# Patient Record
Sex: FEMALE | Race: WHITE | NOT HISPANIC OR LATINO | Employment: PART TIME | URBAN - METROPOLITAN AREA
[De-identification: names, ages, dates, MRNs, and addresses within clinical notes are randomized per-mention and may not be internally consistent; named-entity substitution may affect disease eponyms.]

---

## 2017-10-27 LAB
EXTERNAL HEPATITIS B SURFACE ANTIGEN: NEGATIVE
EXTERNAL RUBELLA QUALITATIVE: NORMAL
EXTERNAL SYPHILIS RPR SCREEN: NORMAL
HIV1 P24 AG SERPL QL IA: NORMAL

## 2017-12-07 ENCOUNTER — TRANSCRIBE ORDERS (OUTPATIENT)
Dept: WOMENS IMAGING | Facility: HOSPITAL | Age: 37
End: 2017-12-07

## 2017-12-07 DIAGNOSIS — O09.522 AMA (ADVANCED MATERNAL AGE) MULTIGRAVIDA 35+, SECOND TRIMESTER: Primary | ICD-10-CM

## 2017-12-07 DIAGNOSIS — Z36.89 SCREENING, ANTENATAL, FOR FETAL ANATOMIC SURVEY: ICD-10-CM

## 2018-01-15 ENCOUNTER — OFFICE VISIT (OUTPATIENT)
Dept: OBSTETRICS AND GYNECOLOGY | Facility: HOSPITAL | Age: 38
End: 2018-01-15

## 2018-01-15 ENCOUNTER — HOSPITAL ENCOUNTER (OUTPATIENT)
Dept: WOMENS IMAGING | Facility: HOSPITAL | Age: 38
Discharge: HOME | End: 2018-01-15
Attending: OBSTETRICS & GYNECOLOGY | Admitting: OBSTETRICS & GYNECOLOGY

## 2018-01-15 VITALS
WEIGHT: 221.8 LBS | DIASTOLIC BLOOD PRESSURE: 64 MMHG | HEIGHT: 65 IN | SYSTOLIC BLOOD PRESSURE: 122 MMHG | BODY MASS INDEX: 36.96 KG/M2

## 2018-01-15 DIAGNOSIS — O09.522 AMA (ADVANCED MATERNAL AGE) MULTIGRAVIDA 35+, SECOND TRIMESTER: ICD-10-CM

## 2018-01-15 DIAGNOSIS — Z82.69 FAMILY HISTORY OF CLUBFOOT: ICD-10-CM

## 2018-01-15 DIAGNOSIS — O09.522 ELDERLY MULTIGRAVIDA IN SECOND TRIMESTER: Primary | ICD-10-CM

## 2018-01-15 DIAGNOSIS — Z36.89 SCREENING, ANTENATAL, FOR FETAL ANATOMIC SURVEY: ICD-10-CM

## 2018-01-15 DIAGNOSIS — Z98.891 PREVIOUS CESAREAN SECTION: ICD-10-CM

## 2018-01-15 PROCEDURE — 76811 OB US DETAILED SNGL FETUS: CPT

## 2018-01-15 PROCEDURE — 76811 OB US DETAILED SNGL FETUS: CPT | Performed by: OBSTETRICS & GYNECOLOGY

## 2018-01-15 RX ORDER — CETIRIZINE HYDROCHLORIDE 10 MG/1
10 TABLET ORAL DAILY
COMMUNITY

## 2018-01-15 RX ORDER — ALBUTEROL SULFATE 90 UG/1
POWDER, METERED RESPIRATORY (INHALATION)
Refills: 2 | Status: ON HOLD | COMMUNITY
Start: 2017-12-04 | End: 2018-05-27

## 2018-01-15 RX ORDER — PROCHLORPERAZINE MALEATE 10 MG
10 TABLET ORAL EVERY 6 HOURS PRN
Status: ON HOLD | COMMUNITY
End: 2018-05-27

## 2018-01-15 RX ORDER — LABETALOL 200 MG/1
200 TABLET, FILM COATED ORAL 2 TIMES DAILY
COMMUNITY
Start: 2018-01-12 | End: 2018-02-05

## 2018-01-15 NOTE — PROGRESS NOTES
Denies problems. Reports had [facility] NIPT with normal results. States she submitted baseline 24 hour urine specimen this morning. Next prenatal visit 2/1/2018.

## 2018-01-15 NOTE — PROGRESS NOTES
Documentation of the ultasound findings, images, and interpretations with be available in the patient's Viewpoint report located in the Chart Review Imaging tab in [employer].

## 2018-01-18 ENCOUNTER — APPOINTMENT (OUTPATIENT)
Dept: WOMENS IMAGING | Facility: HOSPITAL | Age: 38
End: 2018-01-18
Attending: OBSTETRICS & GYNECOLOGY

## 2018-02-01 ENCOUNTER — TRANSCRIBE ORDERS (OUTPATIENT)
Dept: GENERAL RADIOLOGY | Facility: HOSPITAL | Age: 38
End: 2018-02-01

## 2018-02-01 ENCOUNTER — HOSPITAL ENCOUNTER (OUTPATIENT)
Dept: GENERAL RADIOLOGY | Facility: HOSPITAL | Age: 38
Discharge: HOME | End: 2018-02-01
Attending: OBSTETRICS & GYNECOLOGY | Admitting: OBSTETRICS & GYNECOLOGY

## 2018-02-01 DIAGNOSIS — J45.902 CHRONIC OBSTRUCTIVE ASTHMA WITH STATUS ASTHMATICUS (HCC): Primary | ICD-10-CM

## 2018-02-01 DIAGNOSIS — J44.9 CHRONIC OBSTRUCTIVE ASTHMA WITH STATUS ASTHMATICUS (HCC): Primary | ICD-10-CM

## 2018-02-01 DIAGNOSIS — J45.902 CHRONIC OBSTRUCTIVE ASTHMA WITH STATUS ASTHMATICUS (HCC): ICD-10-CM

## 2018-02-01 DIAGNOSIS — J44.9 CHRONIC OBSTRUCTIVE ASTHMA WITH STATUS ASTHMATICUS (HCC): ICD-10-CM

## 2018-02-01 PROCEDURE — 71046 X-RAY EXAM CHEST 2 VIEWS: CPT

## 2018-02-05 ENCOUNTER — OFFICE VISIT (OUTPATIENT)
Dept: PULMONOLOGY | Facility: CLINIC | Age: 38
End: 2018-02-05

## 2018-02-05 VITALS
TEMPERATURE: 97.9 F | BODY MASS INDEX: 37.82 KG/M2 | SYSTOLIC BLOOD PRESSURE: 112 MMHG | OXYGEN SATURATION: 98 % | DIASTOLIC BLOOD PRESSURE: 68 MMHG | WEIGHT: 227 LBS | HEIGHT: 65 IN | HEART RATE: 80 BPM

## 2018-02-05 DIAGNOSIS — E66.09 CLASS 1 OBESITY DUE TO EXCESS CALORIES WITH SERIOUS COMORBIDITY AND BODY MASS INDEX (BMI) OF 32.0 TO 32.9 IN ADULT: ICD-10-CM

## 2018-02-05 DIAGNOSIS — Z87.09 H/O EXTRINSIC ASTHMA: ICD-10-CM

## 2018-02-05 DIAGNOSIS — K21.9 CHRONIC GERD: ICD-10-CM

## 2018-02-05 DIAGNOSIS — R06.02 SOB (SHORTNESS OF BREATH): Primary | ICD-10-CM

## 2018-02-05 PROCEDURE — 94726 PLETHYSMOGRAPHY LUNG VOLUMES: CPT | Performed by: INTERNAL MEDICINE

## 2018-02-05 PROCEDURE — 99204 OFFICE O/P NEW MOD 45 MIN: CPT | Performed by: INTERNAL MEDICINE

## 2018-02-05 PROCEDURE — 94729 DIFFUSING CAPACITY: CPT | Performed by: INTERNAL MEDICINE

## 2018-02-05 PROCEDURE — 94375 RESPIRATORY FLOW VOLUME LOOP: CPT | Performed by: INTERNAL MEDICINE

## 2018-02-05 RX ORDER — METHYLDOPA 250 MG/1
500 TABLET, FILM COATED ORAL 3 TIMES DAILY
COMMUNITY
Start: 2018-01-25 | End: 2018-04-26 | Stop reason: DRUGHIGH

## 2018-02-05 RX ORDER — NIFEDIPINE 10 MG/1
10 CAPSULE ORAL EVERY 4 HOURS PRN
Status: ON HOLD | COMMUNITY
Start: 2018-01-24 | End: 2018-05-27

## 2018-02-05 RX ORDER — CLINDAMYCIN HYDROCHLORIDE 150 MG/1
CAPSULE ORAL
COMMUNITY
Start: 2018-01-25 | End: 2018-03-29

## 2018-02-06 PROBLEM — K21.9 CHRONIC GERD: Status: ACTIVE | Noted: 2018-02-06

## 2018-02-06 PROBLEM — Z87.09 H/O EXTRINSIC ASTHMA: Status: ACTIVE | Noted: 2018-02-06

## 2018-02-06 PROBLEM — E66.811 CLASS 1 OBESITY IN ADULT: Status: ACTIVE | Noted: 2018-02-06

## 2018-02-06 PROBLEM — E66.9 CLASS 1 OBESITY IN ADULT: Status: ACTIVE | Noted: 2018-02-06

## 2018-02-06 NOTE — PROGRESS NOTES
PULMONARY  NOTE    Chief Complaint     Dyspnea, history of asthma, 21 week pregnancy, hypertension    History of Present Illness     37-year-old white female referred for evaluation of dyspnea.    Patient is 21 weeks pregnant and being followed by Dr. Bailey.    She has a history of childhood asthma.  She is been on Advair 500 for years  In the past she feels that her symptoms have been well controlled on Advair.    However he has experienced more dyspnea on exertion, not at rest.  She gets short of breath going short distances, even one flight of stairs.  In addition to the Advair she is been using albuterol approximately 3 times a day for shortness of breath.    She has had issues with blood pressure and had been on beta blockers.  She thought that her symptoms may be related to beta blockers and her beta blocker was changed to methyldopa.  Despite that she continues to have persistent symptoms of dyspnea.    In addition she has experienced a recent upper respiratory tract infection.  She was treated with 2 courses of antibiotics and received a dexamethasone shot.    No lower extremity edema or palpitations.  She has no history of cardiac disease    With her pregnancy she has noted worsening reflux symptoms that are occurring on a daily basis.  Not regular following reflux precautions.    She has no history of pulmonary thromboembolic disease    Patient Active Problem List   Diagnosis   • Family history of clubfoot   • Elderly multigravida in second trimester   • Previous  section   • H/O Asthma   • GERD   • Class 1 obesity in adult     Allergies   Allergen Reactions   • Doxycycline      Chemical esophagitis     • Avelox [Moxifloxacin] Rash   • Zithromax [Azithromycin] Rash       Current Outpatient Prescriptions:   •  albuterol (ACCUNEB) 1.25 MG/3ML nebulizer solution, Take 3 mL by nebulization Every 6 (Six) Hours As Needed for Wheezing or Shortness of Air., Disp: 90 mL, Rfl: 0  •  aspirin 81 MG EC  "tablet, Take 81 mg by mouth Daily., Disp: , Rfl:   •  cetirizine (zyrTEC) 10 MG tablet, Take 10 mg by mouth Daily., Disp: , Rfl:   •  clindamycin (CLEOCIN) 150 MG capsule, , Disp: , Rfl:   •  flunisolide (NASALIDE) 25 MCG/ACT (0.025%) solution nasal spray, Inhale 2 sprays Every 12 (Twelve) Hours., Disp: , Rfl:   •  fluticasone-salmeterol (ADVAIR) 500-50 MCG/DOSE DISKUS, Inhale 2 (Two) Times a Day., Disp: , Rfl:   •  lansoprazole (PREVACID) 15 MG capsule, Take 15 mg by mouth Daily., Disp: , Rfl:   •  methyldopa (ALDOMET) 250 MG tablet, , Disp: , Rfl:   •  NIFEdipine (PROCARDIA) 10 MG capsule, , Disp: , Rfl:   •  PRENATAL GUMMY VITAMIN, Chew 2 each Daily. Takes when able to tolerate, Disp: , Rfl:   •  PROAIR RESPICLICK 108 (90 Base) MCG/ACT inhaler, INHALE 2 PUFFS BY MOUTH EVERY FOUR TO SIX HOURS AS NEEDED, Disp: , Rfl: 2  •  prochlorperazine (COMPAZINE) 10 MG tablet, Take 10 mg by mouth Every 6 (Six) Hours As Needed for Nausea or Vomiting (with migraines)., Disp: , Rfl:   MEDICATION LIST AND ALLERGIES REVIEWED.    Family History   Problem Relation Age of Onset   • Hypothyroidism Mother    • Hypothyroidism Father    • Asthma Sister    • Hypothyroidism Sister    • Hypothyroidism Brother    • Asthma Brother      Social History   Substance Use Topics   • Smoking status: Never Smoker   • Smokeless tobacco: Never Used   • Alcohol use Yes      Comment: occasional use when not pregnant     Social History     Social History Narrative        Has a 9-year-old child with history of asthma    Works as a pediatric nurse    Doesn't drink alcohol    Lifelong nonsmoker     FAMILY AND SOCIAL HISTORY REVIEWED.    Review of Systems  ALSO REFER TO SCANNED ROS SHEET FROM SAME DATE.    /68 (BP Location: Right arm, Patient Position: Sitting, Cuff Size: Adult)  Pulse 80  Temp 97.9 °F (36.6 °C)  Ht 165.1 cm (65\")  Wt 103 kg (227 lb)  LMP 09/03/2017  SpO2 98%  BMI 37.77 kg/m2  Physical Exam   Constitutional: She is oriented " to person, place, and time. She appears well-developed. No distress.   HENT:   Head: Normocephalic and atraumatic.   Neck: No thyromegaly present.   Cardiovascular: Normal rate, regular rhythm and normal heart sounds.    No murmur heard.  Pulmonary/Chest: Effort normal and breath sounds normal. No stridor.   Abdominal: Soft. Bowel sounds are normal.   Protuberant, soft   Musculoskeletal: Normal range of motion. She exhibits no edema.   Lymphadenopathy:     She has no cervical adenopathy.        Right: No supraclavicular and no epitrochlear adenopathy present.        Left: No supraclavicular and no epitrochlear adenopathy present.   Neurological: She is alert and oriented to person, place, and time.   Skin: Skin is warm and dry. She is not diaphoretic.   Psychiatric: She has a normal mood and affect. Her behavior is normal.   Nursing note and vitals reviewed.      Results     PA and lateral chest x-ray from [facility] dated 2/1/2018 reveals no evidence of active disease.  Some basilar atelectasis is noted    PFTs reveal no airway obstruction, no restriction, and a normal diffusion capacity    Problem List       ICD-10-CM ICD-9-CM   1. SOB (shortness of breath) R06.02 786.05   2. H/O Asthma Z87.09 V12.69   3. GERD K21.9 530.81   4. Class 1 obesity E66.09 278.00    Z68.32 V85.32       Discussion     We discussed her test results.  Her exam and PFTs are unremarkable/normal.  She has no evidence of asthma on her current spirometry.  However, she is on therapy which could be masking signs of asthma.    She describes quite prolific symptoms of dyspnea on exertion.  These symptoms seem out of proportion to her physical exam findings.  If asthma were the sole cause of her symptoms, I would expect at least some spirometric abnormality.    Other considerations would be cardiovascular disease although her cardiac silhouette is within normal limits of size on chest x-ray and she has no evidence of heart failure.  I  don't appreciate any overt murmurs on examination, either.    My index of suspicion is low for pulmonary thromboembolic disease but needs to be considered if her dyspnea is not better explained.    For the time being, in case asthma is playing a role, I have elected to increase her ICS.  We'll add Asmanex to her Advair.  Continue albuterol on an as-needed basis.  These medications should be safe during her pregnancy.    Further consideration needs to be given to a transthoracic echocardiogram and consideration of a perfusion scan.  We'll discuss further with Dr. Bailey    At the very least, I'll see her back in about a month    Micah Esquivel MD  Note electronically signed    CC: No Known Provider

## 2018-02-12 ENCOUNTER — TRANSCRIBE ORDERS (OUTPATIENT)
Dept: PULMONOLOGY | Facility: HOSPITAL | Age: 38
End: 2018-02-12

## 2018-02-12 DIAGNOSIS — R06.02 SHORTNESS OF BREATH: Primary | ICD-10-CM

## 2018-02-14 ENCOUNTER — HOSPITAL ENCOUNTER (OUTPATIENT)
Dept: CARDIOLOGY | Facility: HOSPITAL | Age: 38
Discharge: HOME | End: 2018-02-14
Attending: OBSTETRICS & GYNECOLOGY | Admitting: OBSTETRICS & GYNECOLOGY

## 2018-02-14 VITALS
SYSTOLIC BLOOD PRESSURE: 154 MMHG | BODY MASS INDEX: 36.49 KG/M2 | WEIGHT: 227.07 LBS | HEIGHT: 66 IN | DIASTOLIC BLOOD PRESSURE: 74 MMHG

## 2018-02-14 DIAGNOSIS — R06.02 SHORTNESS OF BREATH: ICD-10-CM

## 2018-02-14 LAB
BH CV ECHO MEAS - AO ROOT AREA (BSA CORRECTED): 1.2
BH CV ECHO MEAS - AO ROOT AREA: 5.3 CM^2
BH CV ECHO MEAS - AO ROOT DIAM: 2.6 CM
BH CV ECHO MEAS - BSA(HAYCOCK): 2.2 M^2
BH CV ECHO MEAS - BSA: 2.1 M^2
BH CV ECHO MEAS - BZI_BMI: 37 KILOGRAMS/M^2
BH CV ECHO MEAS - BZI_METRIC_HEIGHT: 167.6 CM
BH CV ECHO MEAS - BZI_METRIC_WEIGHT: 103.9 KG
BH CV ECHO MEAS - CONTRAST EF (2CH): 66.7 ML/M^2
BH CV ECHO MEAS - CONTRAST EF 4CH: 62.9 ML/M^2
BH CV ECHO MEAS - EDV(CUBED): 57.1 ML
BH CV ECHO MEAS - EDV(MOD-SP2): 111 ML
BH CV ECHO MEAS - EDV(MOD-SP4): 105 ML
BH CV ECHO MEAS - EDV(TEICH): 63.9 ML
BH CV ECHO MEAS - EF(CUBED): 79.5 %
BH CV ECHO MEAS - EF(MOD-SP2): 66.7 %
BH CV ECHO MEAS - EF(MOD-SP4): 62.9 %
BH CV ECHO MEAS - EF(TEICH): 72.6 %
BH CV ECHO MEAS - ESV(CUBED): 11.7 ML
BH CV ECHO MEAS - ESV(MOD-SP2): 37 ML
BH CV ECHO MEAS - ESV(MOD-SP4): 39 ML
BH CV ECHO MEAS - ESV(TEICH): 17.5 ML
BH CV ECHO MEAS - FS: 41 %
BH CV ECHO MEAS - IVS/LVPW: 1.3
BH CV ECHO MEAS - IVSD: 1.5 CM
BH CV ECHO MEAS - LA DIMENSION: 4 CM
BH CV ECHO MEAS - LA/AO: 1.5
BH CV ECHO MEAS - LV DIASTOLIC VOL/BSA (35-75): 49.6 ML/M^2
BH CV ECHO MEAS - LV MASS(C)D: 176.4 GRAMS
BH CV ECHO MEAS - LV MASS(C)DI: 83.2 GRAMS/M^2
BH CV ECHO MEAS - LV MAX PG: 10 MMHG
BH CV ECHO MEAS - LV MEAN PG: 5 MMHG
BH CV ECHO MEAS - LV SYSTOLIC VOL/BSA (12-30): 18.4 ML/M^2
BH CV ECHO MEAS - LV V1 MAX: 158 CM/SEC
BH CV ECHO MEAS - LV V1 MEAN: 98.3 CM/SEC
BH CV ECHO MEAS - LV V1 VTI: 29.6 CM
BH CV ECHO MEAS - LVIDD: 3.9 CM
BH CV ECHO MEAS - LVIDS: 2.3 CM
BH CV ECHO MEAS - LVLD AP2: 8.1 CM
BH CV ECHO MEAS - LVLD AP4: 8.5 CM
BH CV ECHO MEAS - LVLS AP2: 6.2 CM
BH CV ECHO MEAS - LVLS AP4: 6.5 CM
BH CV ECHO MEAS - LVOT AREA (M): 4.5 CM^2
BH CV ECHO MEAS - LVOT AREA: 4.5 CM^2
BH CV ECHO MEAS - LVOT DIAM: 2.4 CM
BH CV ECHO MEAS - LVPWD: 1.1 CM
BH CV ECHO MEAS - MV A MAX VEL: 80.2 CM/SEC
BH CV ECHO MEAS - MV E MAX VEL: 67.9 CM/SEC
BH CV ECHO MEAS - MV E/A: 0.85
BH CV ECHO MEAS - PA ACC SLOPE: 1629 CM/SEC^2
BH CV ECHO MEAS - PA ACC TIME: 0.09 SEC
BH CV ECHO MEAS - PA PR(ACCEL): 39.9 MMHG
BH CV ECHO MEAS - RAP SYSTOLE: 8 MMHG
BH CV ECHO MEAS - RVSP: 16 MMHG
BH CV ECHO MEAS - SI(CUBED): 21.4 ML/M^2
BH CV ECHO MEAS - SI(LVOT): 63.2 ML/M^2
BH CV ECHO MEAS - SI(MOD-SP2): 34.9 ML/M^2
BH CV ECHO MEAS - SI(MOD-SP4): 31.2 ML/M^2
BH CV ECHO MEAS - SI(TEICH): 21.9 ML/M^2
BH CV ECHO MEAS - SV(CUBED): 45.4 ML
BH CV ECHO MEAS - SV(LVOT): 133.9 ML
BH CV ECHO MEAS - SV(MOD-SP2): 74 ML
BH CV ECHO MEAS - SV(MOD-SP4): 66 ML
BH CV ECHO MEAS - SV(TEICH): 46.4 ML
BH CV ECHO MEAS - TAPSE (>1.6): 3.2 CM2
BH CV ECHO MEAS - TR MAX VEL: 138 CM/SEC
BH CV VAS BP LEFT ARM: NORMAL MMHG
BH CV XLRA - RV BASE: 3.8 CM
BH CV XLRA - RV LENGTH: 6.2 CM
BH CV XLRA - RV MID: 3.4 CM
LEFT ATRIUM VOLUME INDEX: 26 ML/M2
LV EF 2D ECHO EST: 70 %

## 2018-02-14 PROCEDURE — 93306 TTE W/DOPPLER COMPLETE: CPT | Performed by: INTERNAL MEDICINE

## 2018-02-14 PROCEDURE — 93306 TTE W/DOPPLER COMPLETE: CPT

## 2018-02-24 ENCOUNTER — HOSPITAL ENCOUNTER (EMERGENCY)
Facility: HOSPITAL | Age: 38
Discharge: HOME | End: 2018-02-24
Attending: EMERGENCY MEDICINE | Admitting: EMERGENCY MEDICINE

## 2018-02-24 VITALS
HEART RATE: 85 BPM | SYSTOLIC BLOOD PRESSURE: 137 MMHG | DIASTOLIC BLOOD PRESSURE: 65 MMHG | HEIGHT: 66 IN | OXYGEN SATURATION: 96 % | RESPIRATION RATE: 17 BRPM | WEIGHT: 227 LBS | BODY MASS INDEX: 36.48 KG/M2 | TEMPERATURE: 98 F

## 2018-02-24 DIAGNOSIS — J06.9 UPPER RESPIRATORY TRACT INFECTION, UNSPECIFIED TYPE: ICD-10-CM

## 2018-02-24 DIAGNOSIS — J45.901 EXACERBATION OF ASTHMA, UNSPECIFIED ASTHMA SEVERITY, UNSPECIFIED WHETHER PERSISTENT: Primary | ICD-10-CM

## 2018-02-24 LAB
ALBUMIN SERPL-MCNC: 3.9 G/DL (ref 3.5–5)
ALBUMIN/GLOB SERPL: 1.2 G/DL (ref 1–2)
ALP SERPL-CCNC: 41 U/L (ref 38–126)
ALT SERPL W P-5'-P-CCNC: 24 U/L (ref 13–69)
ANION GAP SERPL CALCULATED.3IONS-SCNC: 20.9 MMOL/L
AST SERPL-CCNC: 14 U/L (ref 15–46)
BASOPHILS # BLD AUTO: 0.07 10*3/MM3 (ref 0–0.2)
BASOPHILS NFR BLD AUTO: 0.5 % (ref 0–2.5)
BILIRUB SERPL-MCNC: 0.3 MG/DL (ref 0.2–1.3)
BUN BLD-MCNC: 6 MG/DL (ref 7–20)
BUN/CREAT SERPL: 10 (ref 7.1–23.5)
CALCIUM SPEC-SCNC: 9.4 MG/DL (ref 8.4–10.2)
CHLORIDE SERPL-SCNC: 110 MMOL/L (ref 98–107)
CO2 SERPL-SCNC: 17 MMOL/L (ref 26–30)
CREAT BLD-MCNC: 0.6 MG/DL (ref 0.6–1.3)
DEPRECATED RDW RBC AUTO: 41.8 FL (ref 37–54)
EOSINOPHIL # BLD AUTO: 0.45 10*3/MM3 (ref 0–0.7)
EOSINOPHIL NFR BLD AUTO: 3.4 % (ref 0–7)
ERYTHROCYTE [DISTWIDTH] IN BLOOD BY AUTOMATED COUNT: 12.8 % (ref 11.5–14.5)
GFR SERPL CREATININE-BSD FRML MDRD: 113 ML/MIN/1.73
GLOBULIN UR ELPH-MCNC: 3.3 GM/DL
GLUCOSE BLD-MCNC: 111 MG/DL (ref 74–98)
HCT VFR BLD AUTO: 37.2 % (ref 37–47)
HGB BLD-MCNC: 13 G/DL (ref 12–16)
HOLD SPECIMEN: NORMAL
HOLD SPECIMEN: NORMAL
IMM GRANULOCYTES # BLD: 0.17 10*3/MM3 (ref 0–0.06)
IMM GRANULOCYTES NFR BLD: 1.3 % (ref 0–0.6)
LYMPHOCYTES # BLD AUTO: 2.3 10*3/MM3 (ref 0.6–3.4)
LYMPHOCYTES NFR BLD AUTO: 17.1 % (ref 10–50)
MCH RBC QN AUTO: 31.7 PG (ref 27–31)
MCHC RBC AUTO-ENTMCNC: 34.9 G/DL (ref 30–37)
MCV RBC AUTO: 90.7 FL (ref 81–99)
MONOCYTES # BLD AUTO: 1.01 10*3/MM3 (ref 0–0.9)
MONOCYTES NFR BLD AUTO: 7.5 % (ref 0–12)
NEUTROPHILS # BLD AUTO: 9.43 10*3/MM3 (ref 2–6.9)
NEUTROPHILS NFR BLD AUTO: 70.2 % (ref 37–80)
NRBC BLD MANUAL-RTO: 0 /100 WBC (ref 0–0)
NT-PROBNP SERPL-MCNC: 21 PG/ML (ref 0–125)
PLATELET # BLD AUTO: 330 10*3/MM3 (ref 130–400)
PMV BLD AUTO: 9.7 FL (ref 6–12)
POTASSIUM BLD-SCNC: 3.9 MMOL/L (ref 3.5–5.1)
PROT SERPL-MCNC: 7.2 G/DL (ref 6.3–8.2)
RBC # BLD AUTO: 4.1 10*6/MM3 (ref 4.2–5.4)
SODIUM BLD-SCNC: 144 MMOL/L (ref 137–145)
TROPONIN I SERPL-MCNC: <0.012 NG/ML (ref 0–0.03)
WBC NRBC COR # BLD: 13.43 10*3/MM3 (ref 4.8–10.8)
WHOLE BLOOD HOLD SPECIMEN: NORMAL
WHOLE BLOOD HOLD SPECIMEN: NORMAL

## 2018-02-24 PROCEDURE — 25010000002 MAGNESIUM SULFATE 2 GM/50ML SOLUTION: Performed by: EMERGENCY MEDICINE

## 2018-02-24 PROCEDURE — 25010000002 METHYLPREDNISOLONE PER 125 MG: Performed by: EMERGENCY MEDICINE

## 2018-02-24 PROCEDURE — 96365 THER/PROPH/DIAG IV INF INIT: CPT

## 2018-02-24 PROCEDURE — 99285 EMERGENCY DEPT VISIT HI MDM: CPT

## 2018-02-24 PROCEDURE — 94799 UNLISTED PULMONARY SVC/PX: CPT

## 2018-02-24 PROCEDURE — 96375 TX/PRO/DX INJ NEW DRUG ADDON: CPT

## 2018-02-24 PROCEDURE — 36600 WITHDRAWAL OF ARTERIAL BLOOD: CPT

## 2018-02-24 PROCEDURE — 80053 COMPREHEN METABOLIC PANEL: CPT | Performed by: EMERGENCY MEDICINE

## 2018-02-24 PROCEDURE — 94640 AIRWAY INHALATION TREATMENT: CPT

## 2018-02-24 PROCEDURE — 93005 ELECTROCARDIOGRAM TRACING: CPT | Performed by: EMERGENCY MEDICINE

## 2018-02-24 PROCEDURE — 83050 HGB METHEMOGLOBIN QUAN: CPT

## 2018-02-24 PROCEDURE — 83880 ASSAY OF NATRIURETIC PEPTIDE: CPT | Performed by: EMERGENCY MEDICINE

## 2018-02-24 PROCEDURE — 82805 BLOOD GASES W/O2 SATURATION: CPT

## 2018-02-24 PROCEDURE — 82375 ASSAY CARBOXYHB QUANT: CPT

## 2018-02-24 PROCEDURE — 85025 COMPLETE CBC W/AUTO DIFF WBC: CPT | Performed by: EMERGENCY MEDICINE

## 2018-02-24 PROCEDURE — 84484 ASSAY OF TROPONIN QUANT: CPT | Performed by: EMERGENCY MEDICINE

## 2018-02-24 RX ORDER — ALBUTEROL SULFATE 2.5 MG/3ML
2.5 SOLUTION RESPIRATORY (INHALATION) ONCE
Status: COMPLETED | OUTPATIENT
Start: 2018-02-24 | End: 2018-02-24

## 2018-02-24 RX ORDER — PREDNISONE 50 MG/1
50 TABLET ORAL DAILY
Qty: 4 TABLET | Refills: 0 | Status: SHIPPED | OUTPATIENT
Start: 2018-02-24 | End: 2018-03-29

## 2018-02-24 RX ORDER — SODIUM CHLORIDE 0.9 % (FLUSH) 0.9 %
10 SYRINGE (ML) INJECTION AS NEEDED
Status: DISCONTINUED | OUTPATIENT
Start: 2018-02-24 | End: 2018-02-24 | Stop reason: HOSPADM

## 2018-02-24 RX ORDER — METHYLPREDNISOLONE SODIUM SUCCINATE 125 MG/2ML
125 INJECTION, POWDER, LYOPHILIZED, FOR SOLUTION INTRAMUSCULAR; INTRAVENOUS ONCE
Status: COMPLETED | OUTPATIENT
Start: 2018-02-24 | End: 2018-02-24

## 2018-02-24 RX ORDER — MAGNESIUM SULFATE HEPTAHYDRATE 40 MG/ML
2 INJECTION, SOLUTION INTRAVENOUS ONCE
Status: COMPLETED | OUTPATIENT
Start: 2018-02-24 | End: 2018-02-24

## 2018-02-24 RX ORDER — ALBUTEROL SULFATE 2.5 MG/3ML
2.5 SOLUTION RESPIRATORY (INHALATION) ONCE
Status: DISCONTINUED | OUTPATIENT
Start: 2018-02-24 | End: 2018-02-24 | Stop reason: HOSPADM

## 2018-02-24 RX ADMIN — ALBUTEROL SULFATE 2.5 MG: 2.5 SOLUTION RESPIRATORY (INHALATION) at 08:33

## 2018-02-24 RX ADMIN — MAGNESIUM SULFATE IN WATER 2 G: 40 INJECTION, SOLUTION INTRAVENOUS at 08:34

## 2018-02-24 RX ADMIN — ALBUTEROL SULFATE 2.5 MG: 2.5 SOLUTION RESPIRATORY (INHALATION) at 09:34

## 2018-02-24 RX ADMIN — METHYLPREDNISOLONE SODIUM SUCCINATE 125 MG: 125 INJECTION, POWDER, FOR SOLUTION INTRAMUSCULAR; INTRAVENOUS at 08:34

## 2018-02-24 NOTE — ED PROVIDER NOTES
TRIAGE CHIEF COMPLAINT:     Nursing and triage notes reviewed    Chief Complaint   Patient presents with   • Shortness of Breath      HPI: Janell Terrell is a 37 y.o. female who presents to the emergency department complaining of Shortness of breath, cough, congestion.  Patient is currently 24 weeks pregnant.  Patient also has a history of asthma.  Patient states she states is having an asthma attack this morning.  She states she felt well yesterday evening when she went to bed.  She states that she woke up coughing, bringing up some green sputum, and wheezing.  She states she's been dealing with some upper respiratory issues since January, for the past several months.  She denies any chest pain.  Denies fevers or chills.  Denies abdominal pain, nausea, vomiting.     REVIEW OF SYSTEMS: All other systems reviewed and are negative     PAST MEDICAL HISTORY:   Past Medical History:   Diagnosis Date   • Anxiety and depression 2006    situational;  was deployed   • Asthma    • GERD (gastroesophageal reflux disease)    • Hypertension 2008    Had preeclampsia and CHTN since then   • Migraines         FAMILY HISTORY:   Family History   Problem Relation Age of Onset   • Hypothyroidism Mother    • Hypothyroidism Father    • Asthma Sister    • Hypothyroidism Sister    • Hypothyroidism Brother    • Asthma Brother         SOCIAL HISTORY:   Social History     Social History   • Marital status:      Spouse name: N/A   • Number of children: N/A   • Years of education: N/A     Occupational History   • Not on file.     Social History Main Topics   • Smoking status: Never Smoker   • Smokeless tobacco: Never Used   • Alcohol use Yes      Comment: occasional use when not pregnant   • Drug use: No   • Sexual activity: Not on file     Other Topics Concern   • Not on file     Social History Narrative        Has a 9-year-old child with history of asthma    Works as a pediatric nurse    Doesn't drink alcohol    Lifelong  nonsmoker        SURGICAL HISTORY:   Past Surgical History:   Procedure Laterality Date   •  SECTION     • WISDOM TOOTH EXTRACTION          CURRENT MEDICATIONS:      Medication List      ASK your doctor about these medications          * PROAIR RESPICLICK 108 (90 Base) MCG/ACT inhaler   Generic drug:  albuterol       * albuterol 1.25 MG/3ML nebulizer solution   Commonly known as:  ACCUNEB   Take 3 mL by nebulization Every 6 (Six) Hours As Needed for Wheezing or   Shortness of Air.       aspirin 81 MG EC tablet       cetirizine 10 MG tablet   Commonly known as:  zyrTEC       clindamycin 150 MG capsule   Commonly known as:  CLEOCIN       flunisolide 25 MCG/ACT (0.025%) solution nasal spray   Commonly known as:  NASALIDE       fluticasone-salmeterol 500-50 MCG/DOSE DISKUS   Commonly known as:  ADVAIR       lansoprazole 15 MG capsule   Commonly known as:  PREVACID       methyldopa 250 MG tablet   Commonly known as:  ALDOMET       NIFEdipine 10 MG capsule   Commonly known as:  PROCARDIA       PRENATAL GUMMY VITAMIN       prochlorperazine 10 MG tablet   Commonly known as:  COMPAZINE       * Notice:  This list has 2 medication(s) that are the same as other   medications prescribed for you. Read the directions carefully, and ask   your doctor or other care provider to review them with you.         ALLERGIES: Doxycycline; Avelox [moxifloxacin]; and Zithromax [azithromycin]     PHYSICAL EXAM:   VITAL SIGNS:   Vitals:    18 0825   Pulse: 94   Resp: 24   Temp: 97.8 °F (36.6 °C)   SpO2: 95%      CONSTITUTIONAL: Awake, oriented, appears non-toxic   HENT: Atraumatic, normocephalic, oral mucosa pink and moist, airway patent.   EYES: Conjunctiva clear   NECK: Trachea midline, non-tender, supple   CARDIOVASCULAR: Normal heart rate, Normal rhythm, No murmurs, rubs, gallops   PULMONARY/CHEST: Decreased air movement with some slight retractions, diffuse wheezes in all lung fields.  ABDOMINAL: Non-distended, soft,  non-tender - no rebound or guarding.  NEUROLOGIC: Non-focal, moving all four extremities, no gross sensory or motor deficits.   EXTREMITIES: No clubbing, cyanosis, or edema   SKIN: Warm, Dry, No erythema, No rash     ED COURSE / MEDICAL DECISION MAKING:   Janell Terrell is a 37 y.o. female who presents to the emergency department for evaluation of shortness of air.  Patient has stable vital signs on arrival but does have a slight increased work of breathing.  She does appear uncomfortable.  Exam reveals some decreased air movement and wheezes to auscultation.  Patient will be given steroids, albuterol, and magnesium to try and help her symptoms.  It was felt the benefits of using these medications in pregnancy outweighed the risks especially given the patient's current presenting symptoms and clinical status.  I did discuss this with the patient prior to administration of these medications.  We will avoid a chest x-ray at this point time as patient states she's had one recently.  EKG obtained on arrival reveals sinus rhythm with rate of 84 bpm.  There are no obvious acute ischemic changes per my interpretation.  Laboratory testing was largely unremarkable.  Slight decrease in CO2.  A blood gas obtained because of this and was within normal limits.  I think some degree of hyperventilation has contributed.  Patient given 2 albuterol treatments and steroids and magnesium with significant improvement in symptoms.  Repeat evaluation revealed no increased work of breathing and no wheezes to auscultation.  We'll place patient on steroids for the next few days and have her follow with her OB/GYN physician.  Return precautions were discussed.    DECISION TO DISCHARGE/ADMIT: see ED care timeline     FINAL IMPRESSION:   1 -- asthma exacerbation   2 -- upper respiratory infection  3 --     Electronically signed by: Zainab Young MD, 2/24/2018 8:28 AM       Zainab Young MD  02/24/18 [id number]

## 2018-02-26 ENCOUNTER — APPOINTMENT (OUTPATIENT)
Dept: CARDIOLOGY | Facility: HOSPITAL | Age: 38
End: 2018-02-26
Attending: OBSTETRICS & GYNECOLOGY

## 2018-02-26 LAB
ARTERIAL PATENCY WRIST A: POSITIVE
BASE EXCESS BLDA CALC-SCNC: -5.2 MMOL/L
BDY SITE: ABNORMAL
COHGB MFR BLD: 0.8 %
HCO3 BLDA-SCNC: 18.5 MMOL/L (ref 22–28)
HGB BLDA-MCNC: 12.9 G/DL (ref 12–18)
HOROWITZ INDEX BLD+IHG-RTO: 21 %
METHGB BLD QL: 0.4 %
MODALITY: ABNORMAL
OXYHGB MFR BLDV: 96.4 % (ref 94–99)
PCO2 BLDA: 25.2 MM HG (ref 35–45)
PH BLDA: 7.47 PH UNITS (ref 7.3–7.5)
PO2 BLDA: 88.6 MM HG (ref 75–100)
SAO2 % BLDCOA: 97.6 %

## 2018-02-27 ENCOUNTER — APPOINTMENT (OUTPATIENT)
Dept: CARDIOLOGY | Facility: HOSPITAL | Age: 38
End: 2018-02-27
Attending: OBSTETRICS & GYNECOLOGY

## 2018-03-15 ENCOUNTER — TRANSCRIBE ORDERS (OUTPATIENT)
Dept: OBSTETRICS AND GYNECOLOGY | Facility: HOSPITAL | Age: 38
End: 2018-03-15

## 2018-03-15 DIAGNOSIS — O09.522 AMA (ADVANCED MATERNAL AGE) MULTIGRAVIDA 35+, SECOND TRIMESTER: Primary | ICD-10-CM

## 2018-03-15 DIAGNOSIS — O09.891 HISTORY OF PRETERM DELIVERY, CURRENTLY PREGNANT IN FIRST TRIMESTER: ICD-10-CM

## 2018-03-29 ENCOUNTER — OFFICE VISIT (OUTPATIENT)
Dept: OBSTETRICS AND GYNECOLOGY | Facility: HOSPITAL | Age: 38
End: 2018-03-29

## 2018-03-29 ENCOUNTER — HOSPITAL ENCOUNTER (OUTPATIENT)
Dept: WOMENS IMAGING | Facility: HOSPITAL | Age: 38
Discharge: HOME | End: 2018-03-29
Attending: OBSTETRICS & GYNECOLOGY | Admitting: OBSTETRICS & GYNECOLOGY

## 2018-03-29 ENCOUNTER — TRANSCRIBE ORDERS (OUTPATIENT)
Dept: DIABETES SERVICES | Facility: HOSPITAL | Age: 38
End: 2018-03-29

## 2018-03-29 VITALS — SYSTOLIC BLOOD PRESSURE: 122 MMHG | BODY MASS INDEX: 37.28 KG/M2 | DIASTOLIC BLOOD PRESSURE: 77 MMHG | WEIGHT: 231 LBS

## 2018-03-29 DIAGNOSIS — O09.891 HISTORY OF PRETERM DELIVERY, CURRENTLY PREGNANT IN FIRST TRIMESTER: ICD-10-CM

## 2018-03-29 DIAGNOSIS — O24.419 GESTATIONAL DIABETES MELLITUS (GDM) IN THIRD TRIMESTER, GESTATIONAL DIABETES METHOD OF CONTROL UNSPECIFIED: Primary | ICD-10-CM

## 2018-03-29 DIAGNOSIS — E66.01 MORBIDLY OBESE (HCC): ICD-10-CM

## 2018-03-29 DIAGNOSIS — O10.913 MATERNAL CHRONIC HYPERTENSION IN THIRD TRIMESTER: ICD-10-CM

## 2018-03-29 DIAGNOSIS — O09.522 AMA (ADVANCED MATERNAL AGE) MULTIGRAVIDA 35+, SECOND TRIMESTER: ICD-10-CM

## 2018-03-29 DIAGNOSIS — Z87.09 H/O EXTRINSIC ASTHMA: ICD-10-CM

## 2018-03-29 DIAGNOSIS — O09.523 ELDERLY MULTIGRAVIDA IN THIRD TRIMESTER: ICD-10-CM

## 2018-03-29 DIAGNOSIS — Z98.891 PREVIOUS CESAREAN SECTION: ICD-10-CM

## 2018-03-29 DIAGNOSIS — O24.410 DIET CONTROLLED GESTATIONAL DIABETES MELLITUS (GDM), ANTEPARTUM: Primary | ICD-10-CM

## 2018-03-29 PROCEDURE — 76816 OB US FOLLOW-UP PER FETUS: CPT | Performed by: OBSTETRICS & GYNECOLOGY

## 2018-03-29 PROCEDURE — 76816 OB US FOLLOW-UP PER FETUS: CPT

## 2018-03-29 RX ORDER — OMEPRAZOLE 40 MG/1
40 CAPSULE, DELAYED RELEASE ORAL DAILY
Status: ON HOLD | COMMUNITY
End: 2018-05-27

## 2018-03-29 NOTE — PROGRESS NOTES
To see Dr. Bailey today.  Taking procardia occasionally for ctx. Denies vaginal bleeding or leaking.  Just dx with GDM yesterday. Will have diabetes education on Monday, April 2.

## 2018-04-02 ENCOUNTER — HOSPITAL ENCOUNTER (OUTPATIENT)
Dept: DIABETES SERVICES | Facility: HOSPITAL | Age: 38
Setting detail: RECURRING SERIES
Discharge: HOME | End: 2018-04-02

## 2018-04-02 PROCEDURE — G0109 DIAB MANAGE TRN IND/GROUP: HCPCS | Performed by: DIETITIAN, REGISTERED

## 2018-04-09 ENCOUNTER — HOSPITAL ENCOUNTER (OUTPATIENT)
Dept: DIABETES SERVICES | Facility: HOSPITAL | Age: 38
Setting detail: RECURRING SERIES
Discharge: HOME | End: 2018-04-09

## 2018-04-09 ENCOUNTER — HOSPITAL ENCOUNTER (OUTPATIENT)
Facility: HOSPITAL | Age: 38
Discharge: HOME | End: 2018-04-09
Attending: OBSTETRICS & GYNECOLOGY | Admitting: OBSTETRICS & GYNECOLOGY

## 2018-04-09 VITALS
SYSTOLIC BLOOD PRESSURE: 133 MMHG | HEART RATE: 77 BPM | WEIGHT: 230 LBS | HEIGHT: 65 IN | BODY MASS INDEX: 38.32 KG/M2 | DIASTOLIC BLOOD PRESSURE: 76 MMHG | RESPIRATION RATE: 16 BRPM | TEMPERATURE: 97.9 F

## 2018-04-09 LAB
BILIRUB UR QL STRIP: NEGATIVE
CLARITY UR: CLEAR
COLOR UR: YELLOW
GLUCOSE UR STRIP-MCNC: NEGATIVE MG/DL
HGB UR QL STRIP.AUTO: NEGATIVE
KETONES UR QL STRIP: NEGATIVE
LEUKOCYTE ESTERASE UR QL STRIP.AUTO: NEGATIVE
NITRITE UR QL STRIP: NEGATIVE
PH UR STRIP.AUTO: 6 [PH] (ref 5–8)
PROT UR QL STRIP: NEGATIVE
SP GR UR STRIP: 1.01 (ref 1–1.03)
UROBILINOGEN UR QL STRIP: NORMAL

## 2018-04-09 PROCEDURE — 59025 FETAL NON-STRESS TEST: CPT

## 2018-04-09 PROCEDURE — 99214 OFFICE O/P EST MOD 30 MIN: CPT | Performed by: OBSTETRICS & GYNECOLOGY

## 2018-04-09 PROCEDURE — G0463 HOSPITAL OUTPT CLINIC VISIT: HCPCS

## 2018-04-09 PROCEDURE — 81003 URINALYSIS AUTO W/O SCOPE: CPT | Performed by: OBSTETRICS & GYNECOLOGY

## 2018-04-10 NOTE — NURSING NOTE
Pt discharged undelivered. RN reviewed discharge instructions, pt verbalized understanding and denied questions. Pt to keep regularly scheduled prenatal appointments. Pt denied leaking of fluid or vaginal bleeding at time of discharge with good fetal movement. Pt ambulated to discharge with spouse and belongings in hand.

## 2018-04-10 NOTE — H&P
"[facility]  Obstetric History and Physical    Chief Complaint   Patient presents with   • Contractions     6x/hr between 3035-9440, prior to that consistently 4-5x/hr        Subjective     Patient is a 37 y.o. female  currently at 30w2d, who presents with a concern of contraction of about 6 an hour since yesterday.  She is on Procardia for a history of BARBARA and  contractions this pregnancy.  She denies leaking or bleeding.  +FM.  She says she now feels \"silly\" since they have now stopped and she feels fine.  She is ready to go home.   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  She sees Dr. Bailey on Thursday.           Prenatal Information:   Prenatal Labs  No results found for: RUBELLAIGGIN, RUBELLASCRN, HEPBSAG, LABRPR, ABORH, ABO, RH, ABSCRN, LABANTI, ABID, LAHFOOM26, HEPCVIRUSABY, [id number], GBSANTIGEN      External Prenatal Results         Pregnancy Outside Results - these were transcribed from office records.  See scanned records for details. Date Time   Hgb  13.0 g/dL 18 0827   Hct  37.2 % 18 0827   ABO      Rh      Antibody Screen      Glucose Fasting GTT      Glucose Tolerance Test 1 hour      Glucose Tolerance Test 3 hour      Gonorrhea (discrete)      Chlamydia (discrete)      RPR      VDRL      Syphillis Antibody      Rubella      HBsAg      Herpes Simplex Virus PCR      Herpes Simplex VIrus Culture      HIV      Hep C RNA Quant PCR      Hep C Antibody      AFP      Group B Strep      GBS Susceptibility to Clindamycin      GBS Susceptibility to Eythromycin      Fetal Fibronectin      Genetic Testing, Maternal Blood      Drug Screening Date Time   Urine Drug Screen      Amphetamine Screen      Barbiturate Screen      Benzodiazepine Screen      Methadone Screen      Phencyclidine Screen      Opiates Screen      THC Screen      Cocaine Screen      Propoxyphene Screen      Buprenorphine Screen      Methamphetamine Screen      Oxycodone Screen      Tryicyclic " Antidepressants Screen                Legend: ^: Historical                       Past OB History:       Obstetric History       T0      L1     SAB0   TAB0   Ectopic0   Molar0   Multiple0   Live Births1       # Outcome Date GA Lbr Alberto/2nd Weight Sex Delivery Anes PTL Lv   2 Current            1  08 35w0d  2948 g (6 lb 8 oz) F CS-Unspec  Y ASA      Complications: Preeclampsia,Placenta previa          Past Medical History: Past Medical History:   Diagnosis Date   • Anxiety and depression     situational;  was deployed   • Asthma    • Diabetes mellitus     gestational   • GERD (gastroesophageal reflux disease)    • Gestational diabetes    • Hypertension     Had preeclampsia and CHTN since then   • Migraines       Past Surgical History Past Surgical History:   Procedure Laterality Date   •  SECTION     • WISDOM TOOTH EXTRACTION        Family History: Family History   Problem Relation Age of Onset   • Hypothyroidism Mother    • Hypothyroidism Father    • Asthma Sister    • Hypothyroidism Sister    • Hypothyroidism Brother    • Asthma Brother       Social History:  reports that she has never smoked. She has never used smokeless tobacco.   reports that she drinks alcohol.   reports that she does not use drugs.        General ROS: Denies fever, HA, shortness of air, muscle weakness, and rashes    Objective       Vital Signs Range for the last 24 hours  Temperature: Temp:  [97.9 °F (36.6 °C)] 97.9 °F (36.6 °C)   Temp Source: Temp src: Oral   BP: BP: (133)/(76) 133/76   Pulse: Heart Rate:  [77] 77   Respirations: Resp:  [16] 16   SPO2:     O2 Amount (l/min):     O2 Devices     Weight: Weight:  [104 kg (230 lb)] 104 kg (230 lb)     Physical Examination: Alert and oriented X 3  Chest - clear to auscultation, no wheezes, rales or rhonchi, symmetric air entry  Heart - normal rate, S1, S2, no murmurs  Abdomen - no rebound tenderness noted  bowel sounds normal  Gravid uterus, No  RUQ pain, No guarding  Extremities - Non-tender bilaterally                            Fetal Heart Rate Assessment   Method:     Beats/min:     Baseline:  140s   Varibility:  mod   Accels:  y   Decels:  n   Tracing Category:  1     Uterine Assessment   Method:     Frequency (min):  None   Ctx Count in 10 min:     Duration:     Intensity:     Intensity by IUPC:     Resting Tone:     Resting Tone by IUPC:     Mastic Units:                 Assessment:  1.  Intrauterine pregnancy at 30w2d weeks gestation with reactive fetal status.    2.  False labour not ruptured  3.   contractions resolved  Plan:  1. FHTs  Reactive NST  2. No cervical change or signs and symptoms of SROM or labour  3. Reviewed labour guidelines  4. All questions have been answered.  5. Discharge home with routine OB follow-up      Dinesh Emanuel MD  2018  9:25 PM

## 2018-04-17 ENCOUNTER — OFFICE VISIT (OUTPATIENT)
Dept: ENDOCRINOLOGY | Facility: CLINIC | Age: 38
End: 2018-04-17

## 2018-04-17 VITALS
HEART RATE: 89 BPM | DIASTOLIC BLOOD PRESSURE: 60 MMHG | BODY MASS INDEX: 38.32 KG/M2 | SYSTOLIC BLOOD PRESSURE: 114 MMHG | HEIGHT: 65 IN | OXYGEN SATURATION: 98 % | WEIGHT: 230 LBS

## 2018-04-17 DIAGNOSIS — O24.414 INSULIN CONTROLLED GESTATIONAL DIABETES MELLITUS (GDM) IN THIRD TRIMESTER: Primary | ICD-10-CM

## 2018-04-17 PROCEDURE — 99243 OFF/OP CNSLTJ NEW/EST LOW 30: CPT | Performed by: INTERNAL MEDICINE

## 2018-04-17 RX ORDER — BLOOD SUGAR DIAGNOSTIC
1 STRIP MISCELLANEOUS NIGHTLY
Qty: 100 EACH | Refills: 1 | Status: SHIPPED | OUTPATIENT
Start: 2018-04-17 | End: 2023-02-27

## 2018-04-17 RX ORDER — LANCETS 33 GAUGE
EACH MISCELLANEOUS
Qty: 100 EACH | Refills: 5 | Status: SHIPPED | OUTPATIENT
Start: 2018-04-17 | End: 2023-02-27

## 2018-04-17 RX ORDER — CHLORPHENIR/PHENYLEPH/ASPIRIN 2-7.8-325
1 TABLET, EFFERVESCENT ORAL DAILY
Qty: 50 STRIP | Refills: 1 | Status: ON HOLD | OUTPATIENT
Start: 2018-04-17 | End: 2018-05-27

## 2018-04-17 RX ORDER — ALBUTEROL SULFATE 2.5 MG/3ML
SOLUTION RESPIRATORY (INHALATION)
Status: ON HOLD | COMMUNITY
Start: 2018-04-08 | End: 2018-05-27

## 2018-04-17 RX ORDER — BLOOD SUGAR DIAGNOSTIC
STRIP MISCELLANEOUS
Qty: 100 EACH | Refills: 5 | Status: ON HOLD | OUTPATIENT
Start: 2018-04-17 | End: 2018-05-27

## 2018-04-17 RX ORDER — GLYBURIDE 2.5 MG/1
TABLET ORAL
COMMUNITY
Start: 2018-04-12 | End: 2018-04-17

## 2018-04-17 RX ORDER — BLOOD SUGAR DIAGNOSTIC
STRIP MISCELLANEOUS
COMMUNITY
Start: 2018-04-02 | End: 2018-04-17 | Stop reason: SDUPTHER

## 2018-04-17 RX ORDER — LANCETS 33 GAUGE
EACH MISCELLANEOUS
COMMUNITY
Start: 2018-04-02 | End: 2018-04-17 | Stop reason: SDUPTHER

## 2018-04-17 RX ORDER — METHYLDOPA 500 MG/1
TABLET, FILM COATED ORAL
Status: ON HOLD | COMMUNITY
Start: 2018-04-09 | End: 2018-05-27

## 2018-04-17 RX ORDER — MONTELUKAST SODIUM 10 MG/1
TABLET ORAL
Status: ON HOLD | COMMUNITY
Start: 2018-04-08 | End: 2018-05-27

## 2018-04-17 NOTE — PROGRESS NOTES
Janell Terrell 37 y.o.  CC:Establish Care (New patient being seen at the request of Heike Bailey MD for a consultation regarding gestational diabetes, ALISHA 2018 with scheduled  on 2018)      [facility]: Establish Care (New patient being seen at the request of Heike Bailey MD for a consultation regarding gestational diabetes, ALISHA 2018 with scheduled  on 2018)    Is  with 9 year old (not macrosomic but that preg complicated by preeclampsia and placenta previa)  Has had diabetes education and is testing sugars  No personal h/o diabetes but has sister with diabetes  Fasting sugars  (eating in the middle of the night at times)  Pp breakfast 115-128  Pp lunch 105-156  Pp dinner 113-145  She is on glyburide 2.5 mg at bedtime  Prior hgn a1c 5.6%  We discussed the diagnosis of gestational diabetes and reviewed her glucose levels/ glucose tolerance test.  We discussed the concept of carbohydrate awaredness and carbohydrate content of meals was discussed.  Impact of sweets and other carb containing foods and types of foods typically high in carbohydrates was discussed. Overall guidelines for meal planning related to specific carbohydrate content of meals was discussed and she was provided recommendations about carbohydrates recommended with meals and with snacks.  She was asked to give up any sugared drinks, and avoid breakfast cereal.  Blood sugar testing fasting and after each meal was reviewed and the patient was taught to use a glucometer to test her blood sugar.  Normal values for blood sugar monitoring were discussed as well, with fasting level less than 95, 1 hour pp blood sugar less than 140 and 2 hour blood sugar less than 120 recommended.  Risks related to poor control of blood sugars during pregnancy were discussed with a focus on specific risks to both her and the baby.  Risks discussed include macrosomia (large birthweight), fetal lung immaturity with respiratory distress  and low oxygen level, stillbirth, low blood sugar after delivery, high bilirubin/jaundice, and hypocalcemia.  Use of medications during pregnancy (eg metformin, glyburide and insulin) was discussed.  Her long term risks (outside of pregnancy) for development of Diabetes Mellitus were reviewed and we discussed the importance of healthy lifestyle now and in the future to help reduce the risk of progression to diabetes.      Allergies   Allergen Reactions   • Doxycycline      Chemical esophagitis     • Avelox [Moxifloxacin] Rash   • Zithromax [Azithromycin] Rash       Current Outpatient Prescriptions:   •  albuterol (ACCUNEB) 1.25 MG/3ML nebulizer solution, Take 3 mL by nebulization Every 6 (Six) Hours As Needed for Wheezing or Shortness of Air., Disp: 90 mL, Rfl: 0  •  albuterol (PROVENTIL) (2.5 MG/3ML) 0.083% nebulizer solution, , Disp: , Rfl:   •  aspirin 81 MG EC tablet, Take 81 mg by mouth Daily., Disp: , Rfl:   •  BREO ELLIPTA 200-25 MCG/INH aerosol powder , , Disp: , Rfl:   •  cetirizine (zyrTEC) 10 MG tablet, Take 10 mg by mouth Daily., Disp: , Rfl:   •  flunisolide (NASALIDE) 25 MCG/ACT (0.025%) solution nasal spray, Inhale 2 sprays Every 12 (Twelve) Hours., Disp: , Rfl:   •  Fluticasone Furoate-Vilanterol (BREO ELLIPTA) 100-25 MCG/INH aerosol powder , Inhale 1 puff Daily., Disp: , Rfl:   •  glyBURIDE (DIAbeta) 2.5 MG tablet, Take one tablet daily at HS, Disp: , Rfl:   •  methyldopa (ALDOMET) 250 MG tablet, Take 500 mg by mouth 3 (Three) Times a Day., Disp: , Rfl:   •  methyldopa (ALDOMET) 500 MG tablet, Take one tablet TID, Disp: , Rfl:   •  montelukast (SINGULAIR) 10 MG tablet, , Disp: , Rfl:   •  NIFEdipine (PROCARDIA) 10 MG capsule, , Disp: , Rfl:   •  omeprazole (priLOSEC) 40 MG capsule, Take 40 mg by mouth Daily., Disp: , Rfl:   •  ONETOUCH DELICA LANCETS 33G misc, , Disp: , Rfl:   •  ONETOUCH VERIO test strip, , Disp: , Rfl:   •  PRENATAL GUMMY VITAMIN, Chew 2 each Daily. Takes when able to tolerate,  Disp: , Rfl:   •  PROAIR RESPICLICK 108 (90 Base) MCG/ACT inhaler, INHALE 2 PUFFS BY MOUTH EVERY FOUR TO SIX HOURS AS NEEDED, Disp: , Rfl: 2  •  prochlorperazine (COMPAZINE) 10 MG tablet, Take 10 mg by mouth Every 6 (Six) Hours As Needed for Nausea or Vomiting (with migraines)., Disp: , Rfl:   Patient Active Problem List    Diagnosis   • Maternal chronic hypertension in third trimester [O10.913]   • Gestational diabetes mellitus (GDM) in third trimester [O24.419]   • H/O Asthma [Z87.09]   • GERD [K21.9]   • Morbidly obese [E66.01]   • Family history of clubfoot [Z82.69]   • Elderly multigravida in third trimester [O09.523]   • Previous  section [Z98.891]     Review of Systems   Constitutional: Positive for fatigue. Negative for activity change, appetite change, chills, diaphoresis, fever and unexpected weight change.   HENT: Negative for congestion, dental problem, drooling, ear discharge, ear pain, facial swelling, hearing loss, mouth sores, nosebleeds, postnasal drip, rhinorrhea, sinus pressure, sneezing, sore throat, tinnitus, trouble swallowing and voice change.    Eyes: Negative for photophobia, pain, discharge, redness, itching and visual disturbance.        Dry eyes    Respiratory: Positive for shortness of breath and wheezing. Negative for apnea, cough, choking, chest tightness and stridor.    Cardiovascular: Positive for palpitations and leg swelling. Negative for chest pain.   Gastrointestinal: Negative for abdominal distention, abdominal pain, anal bleeding, blood in stool, constipation, diarrhea, nausea, rectal pain and vomiting.        GERD   Endocrine: Positive for polydipsia and polyuria. Negative for cold intolerance, heat intolerance and polyphagia.   Genitourinary: Negative for decreased urine volume, difficulty urinating, dysuria, enuresis, flank pain, frequency, genital sores, hematuria and urgency.   Musculoskeletal: Negative for arthralgias, back pain, gait problem, joint swelling,  "myalgias, neck pain and neck stiffness.   Skin: Negative for color change, pallor, rash and wound.   Allergic/Immunologic: Negative for environmental allergies, food allergies and immunocompromised state.   Neurological: Positive for dizziness, weakness, numbness and headaches. Negative for tremors, seizures, syncope, facial asymmetry, speech difficulty and light-headedness.   Hematological: Negative for adenopathy. Does not bruise/bleed easily.   Psychiatric/Behavioral: Positive for sleep disturbance. Negative for agitation, behavioral problems, confusion, decreased concentration, dysphoric mood, hallucinations, self-injury and suicidal ideas. The patient is not nervous/anxious and is not hyperactive.      Social History     Social History   • Marital status:      Spouse name: N/A   • Number of children: N/A   • Years of education: N/A     Occupational History   • Not on file.     Social History Main Topics   • Smoking status: Never Smoker   • Smokeless tobacco: Never Used   • Alcohol use Yes      Comment: occasional use when not pregnant   • Drug use: No   • Sexual activity: Defer     Other Topics Concern   • Not on file     Social History Narrative        Has a 9-year-old child with history of asthma    Works as a pediatric nurse    Doesn't drink alcohol    Lifelong nonsmoker     Family History   Problem Relation Age of Onset   • Hypothyroidism Mother    • Hypothyroidism Father    • Asthma Sister    • Hypothyroidism Sister    • Hypothyroidism Brother    • Asthma Brother      /60   Pulse 89   Ht 165.1 cm (65\")   Wt 104 kg (230 lb)   LMP 09/03/2017   SpO2 98%   BMI 38.27 kg/m²   Physical Exam   Constitutional: She is oriented to person, place, and time. She appears well-developed and well-nourished.   HENT:   Head: Normocephalic and atraumatic.   Nose: Nose normal.   Mouth/Throat: Oropharynx is clear and moist.   Eyes: Conjunctivae, EOM and lids are normal. Pupils are equal, round, and " reactive to light.   Neck: Trachea normal and normal range of motion. Neck supple. Carotid bruit is not present. No tracheal deviation present. No thyroid mass and no thyromegaly present.   Cardiovascular: Normal rate, regular rhythm, normal heart sounds and intact distal pulses.  Exam reveals no gallop and no friction rub.    No murmur heard.  Pulmonary/Chest: Effort normal and breath sounds normal. No respiratory distress. She has no wheezes.   Musculoskeletal: Normal range of motion. She exhibits no edema or deformity.   Lymphadenopathy:     She has no cervical adenopathy.   Neurological: She is alert and oriented to person, place, and time. She has normal reflexes. She displays normal reflexes. No cranial nerve deficit.   Skin: Skin is warm and dry. No rash noted. No cyanosis or erythema. Nails show no clubbing.   Psychiatric: She has a normal mood and affect. Her speech is normal and behavior is normal. Judgment and thought content normal. Cognition and memory are normal.   Nursing note and vitals reviewed.    Results for orders placed or performed during the hospital encounter of 04/09/18   Urinalysis With / Culture If Indicated - Urine, Clean Catch   Result Value Ref Range    Color, UA Yellow Yellow, Straw    Appearance, UA Clear Clear    pH, UA 6.0 5.0 - 8.0    Specific Gravity, UA 1.006 1.001 - 1.030    Glucose, UA Negative Negative    Ketones, UA Negative Negative    Bilirubin, UA Negative Negative    Blood, UA Negative Negative    Protein, UA Negative Negative    Leuk Esterase, UA Negative Negative    Nitrite, UA Negative Negative    Urobilinogen, UA 0.2 E.U./dL 0.2 - 1.0 E.U./dL     Problem List Items Addressed This Visit        Endocrine    Insulin controlled gestational diabetes mellitus (GDM) in third trimester - Primary     She will begin testing sugars fasting and 2 hours after meals and will fax blood sugars weekly to [email or web address].  We will plan to see her back in 3-4 weeks, or  sooner if problems or questions.  Thank you for this referral and please do not hesitate to call for any problems or questions.  Due to higher sugars after meals and fasting would recc d/c glyburide  Start nph 6 unit at hs  Start novolog 4 u before meals tid  email sugars every 2-3 days to titrate   F/u 4 weeks          Relevant Medications    insulin aspart (NOVOLOG FLEXPEN) 100 UNIT/ML solution pen-injector sc pen    insulin NPH (NOVOLIN N) 100 UNIT/ML injection      Other Visit Diagnoses    None.       Return in about 4 weeks (around 5/15/2018) for Recheck 30 min .    Sadia Chapa MA  Signed Gina Lofton MD      Answers for HPI/ROS submitted by the patient on 4/14/2018   Diabetes problem  Diabetes type: gestational  MedicAlert ID: No  Disease duration: 3 weeks  blurred vision: No  foot paresthesias: Yes  foot ulcerations: No  visual change: No  weight loss: Yes  Symptom course: stable  hunger: No  mood changes: Yes  sleepiness: Yes  sweats: No  blackouts: No  hospitalization: No  nocturnal hypoglycemia: No  required assistance: No  required glucagon: No  CVA: No  heart disease: No  impotence: No  nephropathy: No  peripheral neuropathy: No  PVD: No  retinopathy: No  CAD risks: dyslipidemia, family history, hypertension, obesity  Current treatments: diet, oral agent (monotherapy)  Treatment compliance: most of the time  Home blood tests: 3-4 x per day  Monitoring compliance: excellent  Blood glucose trend: fluctuating minimally  breakfast time: 7-8 am  breakfast glucose level:   lunch time: 11-12 pm  lunch glucose level:   dinner time: 5-6 pm  dinner glucose level: 70-90  High score: 130-140  Overall: 110-130  Weight trend: fluctuating minimally  Current diet: diabetic  Meal planning: carbohydrate counting  Exercise: intermittently  Dietitian visit: Yes  Eye exam current: Yes  Sees podiatrist: No

## 2018-04-23 ENCOUNTER — TELEPHONE (OUTPATIENT)
Dept: INTERNAL MEDICINE | Facility: CLINIC | Age: 38
End: 2018-04-23

## 2018-04-23 NOTE — TELEPHONE ENCOUNTER
Patient was advised Dr Lofton did not receive an email yesterday however she did look at email from Thursday and did response today  Advised pt to call if any questions and pt voiced understanding

## 2018-04-26 ENCOUNTER — OFFICE VISIT (OUTPATIENT)
Dept: OBSTETRICS AND GYNECOLOGY | Facility: HOSPITAL | Age: 38
End: 2018-04-26

## 2018-04-26 ENCOUNTER — HOSPITAL ENCOUNTER (OUTPATIENT)
Dept: WOMENS IMAGING | Facility: HOSPITAL | Age: 38
Discharge: HOME | End: 2018-04-26
Attending: OBSTETRICS & GYNECOLOGY | Admitting: OBSTETRICS & GYNECOLOGY

## 2018-04-26 VITALS — WEIGHT: 227.4 LBS | BODY MASS INDEX: 37.84 KG/M2 | DIASTOLIC BLOOD PRESSURE: 63 MMHG | SYSTOLIC BLOOD PRESSURE: 148 MMHG

## 2018-04-26 DIAGNOSIS — O10.913 MATERNAL CHRONIC HYPERTENSION IN THIRD TRIMESTER: ICD-10-CM

## 2018-04-26 DIAGNOSIS — E66.01 MORBIDLY OBESE (HCC): ICD-10-CM

## 2018-04-26 DIAGNOSIS — O09.523 ELDERLY MULTIGRAVIDA IN THIRD TRIMESTER: ICD-10-CM

## 2018-04-26 DIAGNOSIS — Z87.09 H/O EXTRINSIC ASTHMA: ICD-10-CM

## 2018-04-26 DIAGNOSIS — O09.523 ELDERLY MULTIGRAVIDA IN THIRD TRIMESTER: Primary | ICD-10-CM

## 2018-04-26 DIAGNOSIS — O24.419 GESTATIONAL DIABETES MELLITUS (GDM) IN THIRD TRIMESTER, GESTATIONAL DIABETES METHOD OF CONTROL UNSPECIFIED: ICD-10-CM

## 2018-04-26 DIAGNOSIS — Z98.891 PREVIOUS CESAREAN SECTION: ICD-10-CM

## 2018-04-26 DIAGNOSIS — O24.414 INSULIN CONTROLLED GESTATIONAL DIABETES MELLITUS (GDM) IN THIRD TRIMESTER: ICD-10-CM

## 2018-04-26 PROCEDURE — 76816 OB US FOLLOW-UP PER FETUS: CPT

## 2018-04-26 PROCEDURE — 76816 OB US FOLLOW-UP PER FETUS: CPT | Performed by: OBSTETRICS & GYNECOLOGY

## 2018-04-26 PROCEDURE — 76819 FETAL BIOPHYS PROFIL W/O NST: CPT | Performed by: OBSTETRICS & GYNECOLOGY

## 2018-04-26 PROCEDURE — 76819 FETAL BIOPHYS PROFIL W/O NST: CPT

## 2018-04-26 NOTE — PROGRESS NOTES
Dr. Lofton managing GDM. Is now taking NPH and Novolog. RCS scheduled for 5/29/2018. To see Dr. Bailey today.

## 2018-05-09 ENCOUNTER — TRANSCRIBE ORDERS (OUTPATIENT)
Dept: WOMENS IMAGING | Facility: HOSPITAL | Age: 38
End: 2018-05-09

## 2018-05-09 DIAGNOSIS — O09.213 PREVIOUS PRETERM DELIVERY IN THIRD TRIMESTER, ANTEPARTUM: Primary | ICD-10-CM

## 2018-05-09 DIAGNOSIS — Z87.59 HISTORY OF PLACENTA PREVIA: ICD-10-CM

## 2018-05-12 ENCOUNTER — HOSPITAL ENCOUNTER (OUTPATIENT)
Facility: HOSPITAL | Age: 38
Discharge: HOME | End: 2018-05-12
Attending: OBSTETRICS & GYNECOLOGY | Admitting: OBSTETRICS & GYNECOLOGY

## 2018-05-12 VITALS
DIASTOLIC BLOOD PRESSURE: 71 MMHG | WEIGHT: 230 LBS | HEART RATE: 85 BPM | SYSTOLIC BLOOD PRESSURE: 122 MMHG | TEMPERATURE: 97.9 F | RESPIRATION RATE: 15 BRPM | BODY MASS INDEX: 38.32 KG/M2 | HEIGHT: 65 IN

## 2018-05-12 LAB
ALP SERPL-CCNC: 60 U/L (ref 25–100)
ALT SERPL W P-5'-P-CCNC: 16 U/L (ref 7–40)
AST SERPL-CCNC: 15 U/L (ref 0–33)
BILIRUB SERPL-MCNC: 0.3 MG/DL (ref 0.3–1.2)
CREAT BLD-MCNC: 0.6 MG/DL (ref 0.6–1.3)
DEPRECATED RDW RBC AUTO: 45.8 FL (ref 37–54)
ERYTHROCYTE [DISTWIDTH] IN BLOOD BY AUTOMATED COUNT: 14 % (ref 11.3–14.5)
GLUCOSE BLDC GLUCOMTR-MCNC: 86 MG/DL (ref 70–130)
HCT VFR BLD AUTO: 35.4 % (ref 34.5–44)
HGB BLD-MCNC: 11.6 G/DL (ref 11.5–15.5)
LDH SERPL-CCNC: 134 U/L (ref 120–246)
MCH RBC QN AUTO: 29.2 PG (ref 27–31)
MCHC RBC AUTO-ENTMCNC: 32.8 G/DL (ref 32–36)
MCV RBC AUTO: 89.2 FL (ref 80–99)
PLATELET # BLD AUTO: 299 10*3/MM3 (ref 150–450)
PMV BLD AUTO: 10.1 FL (ref 6–12)
RBC # BLD AUTO: 3.97 10*6/MM3 (ref 3.89–5.14)
URATE SERPL-MCNC: 4.6 MG/DL (ref 3.1–7.8)
WBC NRBC COR # BLD: 14.48 10*3/MM3 (ref 3.5–10.8)

## 2018-05-12 PROCEDURE — 84450 TRANSFERASE (AST) (SGOT): CPT | Performed by: OBSTETRICS & GYNECOLOGY

## 2018-05-12 PROCEDURE — 84075 ASSAY ALKALINE PHOSPHATASE: CPT | Performed by: OBSTETRICS & GYNECOLOGY

## 2018-05-12 PROCEDURE — 82962 GLUCOSE BLOOD TEST: CPT

## 2018-05-12 PROCEDURE — 82247 BILIRUBIN TOTAL: CPT | Performed by: OBSTETRICS & GYNECOLOGY

## 2018-05-12 PROCEDURE — 83615 LACTATE (LD) (LDH) ENZYME: CPT | Performed by: OBSTETRICS & GYNECOLOGY

## 2018-05-12 PROCEDURE — 59025 FETAL NON-STRESS TEST: CPT

## 2018-05-12 PROCEDURE — 99213 OFFICE O/P EST LOW 20 MIN: CPT | Performed by: OBSTETRICS & GYNECOLOGY

## 2018-05-12 PROCEDURE — 85027 COMPLETE CBC AUTOMATED: CPT | Performed by: OBSTETRICS & GYNECOLOGY

## 2018-05-12 PROCEDURE — 82565 ASSAY OF CREATININE: CPT | Performed by: OBSTETRICS & GYNECOLOGY

## 2018-05-12 PROCEDURE — 84460 ALANINE AMINO (ALT) (SGPT): CPT | Performed by: OBSTETRICS & GYNECOLOGY

## 2018-05-12 PROCEDURE — 84550 ASSAY OF BLOOD/URIC ACID: CPT | Performed by: OBSTETRICS & GYNECOLOGY

## 2018-05-12 PROCEDURE — 59025 FETAL NON-STRESS TEST: CPT | Performed by: OBSTETRICS & GYNECOLOGY

## 2018-05-12 PROCEDURE — G0463 HOSPITAL OUTPT CLINIC VISIT: HCPCS

## 2018-05-12 NOTE — NON STRESS TEST
NST note  Indication: 1.  Transient gestational hypertension.                    2.  Insulin-dependent gestational diabetes mellitus  Beginning time: 1442  Ending time: 1602  Baseline fetal heart rate: 135-145.  Fetal heart rate accelerations (15 x 15): Present.  Fetal heart rate decelerations: Absent.  Uterine contractions: Absent.  NST interpretation: Reactive.

## 2018-05-12 NOTE — H&P
\[facility]  Obstetric History and Physical    Referring Provider: Heike Bailey MD      Chief Complaint   Patient presents with   • Hypertension       Subjective     Patient is a 37 y.o. female  currently at 35w0d, who presents with complaints of hypertension.  She states she has been followed in the office with recent blood pressure elevations.  Patient's physician reportedly advised her to perform home blood pressure monitoring.  Patient states that she had systolic blood pressures ranging from 150-170 last evening and this morning.  She denies headache, scotomata or epigastric pain.  Her prenatal care is complicated by gestational diabetes, advanced maternal age and history of previous  delivery.  .  Her previous obstetric/gynecological history is remarkable for prior  delivery at 35 weeks gestation secondary to preeclampsia.  The patient is being treated with antepartum low-dose aspirin therapy .    The following portions of the patients history were reviewed and updated as appropriate: current medications, allergies, past medical history, past surgical history, past family history, past social history and problem list .       Prenatal Information:   Maternal Prenatal Labs  Blood Type No results found for: ABO   Rh Status No results found for: RH   Antibody Screen No results found for: ABSCRN   Gonnorhea No results found for: GCCX   Chlamydia No results found for: CLAMYDCU   RPR No results found for: RPR   Syphilis Antibody No results found for: SYPHILIS   Rubella No results found for: RUBELLAIGGIN   Hepatitis B Surface Antigen No results found for: HEPBSAG   HIV-1 Antibody No results found for: LABHIV1   Hepatitis C Antibody No results found for: HEPCAB   Rapid Urin Drug Screen No results found for: AMPMETHU, BARBITSCNUR, LABBENZSCN, LABMETHSCN, LABOPIASCN, THCURSCR, COCAINEUR, AMPHETSCREEN, PROPOXSCN, BUPRENORSCNU, METAMPSCNUR, OXYCODONESCN, TRICYCLICSCN   Group B Strep Culture No  results found for: GBSANTIGEN           External Prenatal Results         Pregnancy Outside Results - these were transcribed from office records.  See scanned records for details. Date Time   Hgb  13.0 g/dL 18   Hct  37.2 % 18   ABO      Rh      Antibody Screen      Glucose Fasting GTT      Glucose Tolerance Test 1 hour      Glucose Tolerance Test 3 hour      Gonorrhea (discrete)      Chlamydia (discrete)      RPR      VDRL      Syphillis Antibody      Rubella      HBsAg      Herpes Simplex Virus PCR      Herpes Simplex VIrus Culture      HIV      Hep C RNA Quant PCR      Hep C Antibody      AFP      Group B Strep      GBS Susceptibility to Clindamycin      GBS Susceptibility to Eythromycin      Fetal Fibronectin      Genetic Testing, Maternal Blood      Drug Screening Date Time   Urine Drug Screen      Amphetamine Screen      Barbiturate Screen      Benzodiazepine Screen      Methadone Screen      Phencyclidine Screen      Opiates Screen      THC Screen      Cocaine Screen      Propoxyphene Screen      Buprenorphine Screen      Methamphetamine Screen      Oxycodone Screen      Tryicyclic Antidepressants Screen                Legend: ^: Historical                       Past OB History:       Obstetric History       T0      L1     SAB0   TAB0   Ectopic0   Molar0   Multiple0   Live Births1       # Outcome Date GA Lbr Alberto/2nd Weight Sex Delivery Anes PTL Lv   2 Current            1  08 35w0d  2948 g (6 lb 8 oz) F CS-Unspec  Y ASA      Complications: Preeclampsia,Placenta previa          Past Medical History: Past Medical History:   Diagnosis Date   • Anxiety and depression     situational;  was deployed   • Asthma    • Diabetes mellitus     gestational   • GERD (gastroesophageal reflux disease)    • Gestational diabetes    • Hypertension     Had preeclampsia and CHTN since then   • Migraines       Past Surgical History Past Surgical History:   Procedure  Laterality Date   •  SECTION     • WISDOM TOOTH EXTRACTION        Family History: Family History   Problem Relation Age of Onset   • Hypothyroidism Mother    • Hypothyroidism Father    • Asthma Sister    • Hypothyroidism Sister    • Hypothyroidism Brother    • Asthma Brother       Social History:  reports that she has never smoked. She has never used smokeless tobacco.   reports that she drinks alcohol.   reports that she does not use drugs.                   General ROS Negative Findings:Patient denies headache, scotomata or epigastric pain.  She denies bleeding or ruptured membranes.  All other review of systems negative.    ROS      Objective       Vital Signs Range for the last 24 hours  Temperature: Temp:  [97.9 °F (36.6 °C)] 97.9 °F (36.6 °C)   Temp Source: Temp src: Oral   BP: BP: (131-134)/(70-82) 131/70   Pulse: Heart Rate:  [83-93] 86   Respirations: Resp:  [15] 15   SPO2:     O2 Amount (l/min):     O2 Devices     Weight: Weight:  [104 kg (230 lb)] 104 kg (230 lb)     Physical Examination:   General:   alert, appears stated age and cooperative   Skin:   normal   HEENT:     Lungs:   clear to auscultation bilaterally   Heart:   regular rate and rhythm, S1, S2 normal, no murmur, click, rub or gallop   Abdomen:  soft, non-tender; bowel sounds normal; no masses,  no organomegaly   Lower Extremeties 1+ pedal edema noted.  Deep tendon reflexes 2+ without clonus.           Pelvic examination deferred.          .                       Fetal Heart Rate Assessment   Method: EFM. NST , indication: hypertension     Beats/min:K      Baseline:135-145     Varibility: average      Accels: present, 15x15     Decels: absent     Tracing Category: category 1       Uterine Assessment   Method: uterine toco     Frequency (min): irregular     Ctx Count in 10 min:     Duration:     Intensity:     Intensity by IUPC:     Resting Tone: Uterine Resting Tone: soft by palpation   Resting Tone by IUPC:     [place] Units:    "    Laboratory Results:   Lab Results (last 24 hours)     Procedure Component Value Units Date/Time    Preeclampsia Panel [[id number]] Collected:  18 151    Specimen:  Blood Updated:  18 1523    CBC (No Diff) [[id number]] Collected:  18 151    Specimen:  Blood Updated:  18 1523    POC Glucose Once [[id number]]  (Normal) Collected:  18 1504    Specimen:  Blood Updated:  18 1507     Glucose 86 mg/dL     Narrative:       Meter: [id number] : [id number] Rolando Reagan        Radiology Review:   Imaging Results (last 24 hours)     ** No results found for the last 24 hours. **        Other Studies:    Assessment/Plan     Active Problems:    * No active hospital problems. *        Assessment:  1.  Intrauterine pregnancy at 35w0d weeks gestation with reassuring fetal status.    2.  Patient is normotensive on evaluation.  CBC and PEEP show no evidence of preeclampsia or      HELLP syndrome  3.  Advanced maternal age.  4.  History of previous  delivery.  Repeat  section planned.  5.  Type A2 gestational diabetes mellitus.    Plan:  1. Discharge to home.   Signs and symptoms of preeclampsia have been reviewed.  Patient instructed regarding continued home blood pressure monitoring.  Recommended patient check her blood pressures while lying on her left side for at least 5 minutes prior to taking blood pressure.  2. Plan of care has been reviewed with patient.  3.  Risks, benefits of treatment plan have been discussed.  4.  All questions have been answered.        Arpit Samuel \"Sapphire\" LEANNA Blandon MD  2018  3:27 PM  "

## 2018-05-14 ENCOUNTER — HOSPITAL ENCOUNTER (OUTPATIENT)
Dept: WOMENS IMAGING | Facility: HOSPITAL | Age: 38
Discharge: HOME | End: 2018-05-14
Attending: OBSTETRICS & GYNECOLOGY | Admitting: OBSTETRICS & GYNECOLOGY

## 2018-05-14 ENCOUNTER — OFFICE VISIT (OUTPATIENT)
Dept: OBSTETRICS AND GYNECOLOGY | Facility: HOSPITAL | Age: 38
End: 2018-05-14

## 2018-05-14 VITALS — SYSTOLIC BLOOD PRESSURE: 125 MMHG | BODY MASS INDEX: 38.91 KG/M2 | DIASTOLIC BLOOD PRESSURE: 72 MMHG | WEIGHT: 233.8 LBS

## 2018-05-14 DIAGNOSIS — O24.414 INSULIN CONTROLLED GESTATIONAL DIABETES MELLITUS (GDM) IN THIRD TRIMESTER: Primary | ICD-10-CM

## 2018-05-14 DIAGNOSIS — Z87.59 HISTORY OF PLACENTA PREVIA: ICD-10-CM

## 2018-05-14 DIAGNOSIS — E66.01 MORBIDLY OBESE (HCC): ICD-10-CM

## 2018-05-14 DIAGNOSIS — O09.523 ELDERLY MULTIGRAVIDA IN THIRD TRIMESTER: ICD-10-CM

## 2018-05-14 DIAGNOSIS — Z98.891 PREVIOUS CESAREAN SECTION: ICD-10-CM

## 2018-05-14 DIAGNOSIS — O09.213 PREVIOUS PRETERM DELIVERY IN THIRD TRIMESTER, ANTEPARTUM: ICD-10-CM

## 2018-05-14 PROCEDURE — 76816 OB US FOLLOW-UP PER FETUS: CPT | Performed by: OBSTETRICS & GYNECOLOGY

## 2018-05-14 PROCEDURE — 76816 OB US FOLLOW-UP PER FETUS: CPT

## 2018-05-14 NOTE — PROGRESS NOTES
Reports positive fetal movement but a little sluggish this morning.  Reports slight swelling in hands and face, denies visual changes, has morning headache that goes away with food and drink.  Reports her BP cuff at home has been read high.  Discussed size of cuff and how it affects readings.

## 2018-05-15 ENCOUNTER — APPOINTMENT (OUTPATIENT)
Dept: WOMENS IMAGING | Facility: HOSPITAL | Age: 38
End: 2018-05-15
Attending: OBSTETRICS & GYNECOLOGY

## 2018-05-22 ENCOUNTER — HOSPITAL ENCOUNTER (INPATIENT)
Facility: HOSPITAL | Age: 38
LOS: 5 days | Discharge: HOME | End: 2018-05-28
Attending: OBSTETRICS & GYNECOLOGY | Admitting: OBSTETRICS & GYNECOLOGY

## 2018-05-22 LAB
ABO GROUP BLD: NORMAL
ALP SERPL-CCNC: 69 U/L (ref 25–100)
ALT SERPL W P-5'-P-CCNC: 17 U/L (ref 7–40)
AST SERPL-CCNC: 16 U/L (ref 0–33)
BILIRUB SERPL-MCNC: 0.3 MG/DL (ref 0.3–1.2)
BLD GP AB SCN SERPL QL: NEGATIVE
CREAT BLD-MCNC: 0.7 MG/DL (ref 0.6–1.3)
DEPRECATED RDW RBC AUTO: 46.6 FL (ref 37–54)
ERYTHROCYTE [DISTWIDTH] IN BLOOD BY AUTOMATED COUNT: 14.5 % (ref 11.3–14.5)
GLUCOSE BLDC GLUCOMTR-MCNC: 98 MG/DL (ref 70–130)
HCT VFR BLD AUTO: 35.6 % (ref 34.5–44)
HGB BLD-MCNC: 11.7 G/DL (ref 11.5–15.5)
LDH SERPL-CCNC: 177 U/L (ref 120–246)
MCH RBC QN AUTO: 29 PG (ref 27–31)
MCHC RBC AUTO-ENTMCNC: 32.9 G/DL (ref 32–36)
MCV RBC AUTO: 88.3 FL (ref 80–99)
PLATELET # BLD AUTO: 334 10*3/MM3 (ref 150–450)
PMV BLD AUTO: 10.6 FL (ref 6–12)
RBC # BLD AUTO: 4.03 10*6/MM3 (ref 3.89–5.14)
RH BLD: POSITIVE
T&S EXPIRATION DATE: NORMAL
URATE SERPL-MCNC: 5.4 MG/DL (ref 3.1–7.8)
WBC NRBC COR # BLD: 15.07 10*3/MM3 (ref 3.5–10.8)

## 2018-05-22 PROCEDURE — 59025 FETAL NON-STRESS TEST: CPT

## 2018-05-22 PROCEDURE — 85027 COMPLETE CBC AUTOMATED: CPT | Performed by: OBSTETRICS & GYNECOLOGY

## 2018-05-22 PROCEDURE — 83615 LACTATE (LD) (LDH) ENZYME: CPT | Performed by: OBSTETRICS & GYNECOLOGY

## 2018-05-22 PROCEDURE — 63710000001 INSULIN ISOPHANE HUMAN PER 5 UNITS: Performed by: OBSTETRICS & GYNECOLOGY

## 2018-05-22 PROCEDURE — 63710000001 INSULIN LISPRO (HUMAN) PER 5 UNITS: Performed by: OBSTETRICS & GYNECOLOGY

## 2018-05-22 PROCEDURE — 86850 RBC ANTIBODY SCREEN: CPT | Performed by: OBSTETRICS & GYNECOLOGY

## 2018-05-22 PROCEDURE — 82962 GLUCOSE BLOOD TEST: CPT

## 2018-05-22 PROCEDURE — 84450 TRANSFERASE (AST) (SGOT): CPT | Performed by: OBSTETRICS & GYNECOLOGY

## 2018-05-22 PROCEDURE — 25010000002 BETAMETHASONE ACET & SOD PHOS PER 4 MG: Performed by: OBSTETRICS & GYNECOLOGY

## 2018-05-22 PROCEDURE — 86901 BLOOD TYPING SEROLOGIC RH(D): CPT | Performed by: OBSTETRICS & GYNECOLOGY

## 2018-05-22 PROCEDURE — 63710000001 DIPHENHYDRAMINE PER 50 MG: Performed by: OBSTETRICS & GYNECOLOGY

## 2018-05-22 PROCEDURE — 84550 ASSAY OF BLOOD/URIC ACID: CPT | Performed by: OBSTETRICS & GYNECOLOGY

## 2018-05-22 PROCEDURE — 82247 BILIRUBIN TOTAL: CPT | Performed by: OBSTETRICS & GYNECOLOGY

## 2018-05-22 PROCEDURE — 86900 BLOOD TYPING SEROLOGIC ABO: CPT | Performed by: OBSTETRICS & GYNECOLOGY

## 2018-05-22 PROCEDURE — 84460 ALANINE AMINO (ALT) (SGPT): CPT | Performed by: OBSTETRICS & GYNECOLOGY

## 2018-05-22 PROCEDURE — 84075 ASSAY ALKALINE PHOSPHATASE: CPT | Performed by: OBSTETRICS & GYNECOLOGY

## 2018-05-22 PROCEDURE — 82565 ASSAY OF CREATININE: CPT | Performed by: OBSTETRICS & GYNECOLOGY

## 2018-05-22 RX ORDER — DOCUSATE SODIUM 100 MG/1
100 CAPSULE, LIQUID FILLED ORAL DAILY
Status: DISCONTINUED | OUTPATIENT
Start: 2018-05-22 | End: 2018-05-28 | Stop reason: SDUPTHER

## 2018-05-22 RX ORDER — NICOTINE POLACRILEX 4 MG
15 LOZENGE BUCCAL
Status: DISCONTINUED | OUTPATIENT
Start: 2018-05-22 | End: 2018-05-26

## 2018-05-22 RX ORDER — BETAMETHASONE SODIUM PHOSPHATE AND BETAMETHASONE ACETATE 3; 3 MG/ML; MG/ML
12 INJECTION, SUSPENSION INTRA-ARTICULAR; INTRALESIONAL; INTRAMUSCULAR; SOFT TISSUE EVERY 24 HOURS
Status: COMPLETED | OUTPATIENT
Start: 2018-05-22 | End: 2018-05-23

## 2018-05-22 RX ORDER — DOCUSATE SODIUM 100 MG/1
100 CAPSULE, LIQUID FILLED ORAL DAILY
COMMUNITY
End: 2018-07-09

## 2018-05-22 RX ORDER — DEXTROSE MONOHYDRATE 25 G/50ML
25 INJECTION, SOLUTION INTRAVENOUS
Status: DISCONTINUED | OUTPATIENT
Start: 2018-05-22 | End: 2018-05-26

## 2018-05-22 RX ORDER — ALBUTEROL SULFATE 2.5 MG/3ML
2.5 SOLUTION RESPIRATORY (INHALATION) EVERY 6 HOURS PRN
Status: DISCONTINUED | OUTPATIENT
Start: 2018-05-22 | End: 2018-05-26

## 2018-05-22 RX ORDER — DIPHENHYDRAMINE HCL 25 MG
25 CAPSULE ORAL NIGHTLY PRN
Status: DISCONTINUED | OUTPATIENT
Start: 2018-05-22 | End: 2018-05-28 | Stop reason: SDUPTHER

## 2018-05-22 RX ORDER — CETIRIZINE HYDROCHLORIDE 10 MG/1
10 TABLET ORAL DAILY
Status: DISCONTINUED | OUTPATIENT
Start: 2018-05-23 | End: 2018-05-26

## 2018-05-22 RX ORDER — CALCIUM CARBONATE 200(500)MG
2 TABLET,CHEWABLE ORAL 3 TIMES DAILY PRN
Status: DISCONTINUED | OUTPATIENT
Start: 2018-05-22 | End: 2018-05-28 | Stop reason: SDUPTHER

## 2018-05-22 RX ORDER — PROCHLORPERAZINE MALEATE 10 MG
10 TABLET ORAL EVERY 6 HOURS PRN
Status: DISCONTINUED | OUTPATIENT
Start: 2018-05-22 | End: 2018-05-28 | Stop reason: HOSPADM

## 2018-05-22 RX ORDER — MONTELUKAST SODIUM 10 MG/1
10 TABLET ORAL NIGHTLY
Status: DISCONTINUED | OUTPATIENT
Start: 2018-05-22 | End: 2018-05-26

## 2018-05-22 RX ORDER — OMEPRAZOLE 40 MG/1
40 CAPSULE, DELAYED RELEASE ORAL
Status: DISCONTINUED | OUTPATIENT
Start: 2018-05-23 | End: 2018-05-28 | Stop reason: HOSPADM

## 2018-05-22 RX ORDER — METHYLDOPA 250 MG/1
500 TABLET, FILM COATED ORAL EVERY 8 HOURS SCHEDULED
Status: DISCONTINUED | OUTPATIENT
Start: 2018-05-22 | End: 2018-05-26

## 2018-05-22 RX ORDER — DIPHENHYDRAMINE HCL 25 MG
25 CAPSULE ORAL NIGHTLY PRN
COMMUNITY
End: 2018-07-09

## 2018-05-22 RX ORDER — FLUNISOLIDE 0.25 MG/ML
1 SOLUTION NASAL DAILY
Status: DISCONTINUED | OUTPATIENT
Start: 2018-05-23 | End: 2018-05-26

## 2018-05-22 RX ORDER — CALCIUM CARBONATE 200(500)MG
2 TABLET,CHEWABLE ORAL
COMMUNITY
End: 2018-07-09

## 2018-05-22 RX ORDER — ASPIRIN 81 MG/1
81 TABLET, CHEWABLE ORAL DAILY
Status: DISCONTINUED | OUTPATIENT
Start: 2018-05-23 | End: 2018-05-24

## 2018-05-22 RX ADMIN — BETAMETHASONE SODIUM PHOSPHATE AND BETAMETHASONE ACETATE 12 MG: 3; 3 INJECTION, SUSPENSION INTRA-ARTICULAR; INTRALESIONAL; INTRAMUSCULAR at 18:58

## 2018-05-22 RX ADMIN — INSULIN HUMAN 38 UNITS: 100 INJECTION, SUSPENSION SUBCUTANEOUS at 22:24

## 2018-05-22 RX ADMIN — DIPHENHYDRAMINE HYDROCHLORIDE 25 MG: 25 CAPSULE ORAL at 23:57

## 2018-05-22 RX ADMIN — MONTELUKAST SODIUM 10 MG: 10 TABLET, FILM COATED ORAL at 22:51

## 2018-05-22 RX ADMIN — METHYLDOPA 500 MG: 250 TABLET ORAL at 22:51

## 2018-05-22 NOTE — H&P
Subjective     Chief Complaint   Patient presents with   • Hypertension       Janell Terrell is a 37 y.o. year old  with an Estimated Date of Delivery: 18 currently at 36w3d presenting with severe range BP at home.  H/O CHTN, Asthma, GERD, and A2DM unable to achieve normal fasting level blood sugars on insulin.    Prenatal care has been with Dr. Bailey.  It has been significant for above issues.        The following portions of the patient's history were reviewed and updated as appropriate:vital signs, allergies, current medications, past medical history, past social history, past surgical history and problem list.    Review of Systems  Pertinent items are noted in HPI.     Objective     LMP 2017     Physical Exam    General:  well developed; well nourished  no acute distress           Abdomen: soft, non-tender; no masses  no umbilical or inginual hernias are present       FHT's: reactive and category 1             ASSESSMENT  1. IUP at 36w3d  2. CHTN - r/o SI preeclampsia.  Failed outpatient management of BP  3. A2DM  4. Asthma  5. GERD    PLAN  1. Admit for evaluation of blood pressures.  If severe criteria met proceed with delivery.  If not inpt management until delivery at 37 weeks.           Heike Bailey MD  2018@

## 2018-05-23 PROBLEM — Z34.90 PREGNANT: Status: ACTIVE | Noted: 2018-05-23

## 2018-05-23 LAB
GLUCOSE BLDC GLUCOMTR-MCNC: 104 MG/DL (ref 70–130)
GLUCOSE BLDC GLUCOMTR-MCNC: 112 MG/DL (ref 70–130)
GLUCOSE BLDC GLUCOMTR-MCNC: 125 MG/DL (ref 70–130)
GLUCOSE BLDC GLUCOMTR-MCNC: 133 MG/DL (ref 70–130)

## 2018-05-23 PROCEDURE — 25010000002 BETAMETHASONE ACET & SOD PHOS PER 4 MG: Performed by: OBSTETRICS & GYNECOLOGY

## 2018-05-23 PROCEDURE — 63710000001 INSULIN LISPRO (HUMAN) PER 5 UNITS: Performed by: OBSTETRICS & GYNECOLOGY

## 2018-05-23 PROCEDURE — 59025 FETAL NON-STRESS TEST: CPT

## 2018-05-23 PROCEDURE — 63710000001 DIPHENHYDRAMINE PER 50 MG: Performed by: OBSTETRICS & GYNECOLOGY

## 2018-05-23 PROCEDURE — 82962 GLUCOSE BLOOD TEST: CPT

## 2018-05-23 RX ADMIN — ASPIRIN 81 MG 81 MG: 81 TABLET ORAL at 08:01

## 2018-05-23 RX ADMIN — METHYLDOPA 500 MG: 250 TABLET ORAL at 22:57

## 2018-05-23 RX ADMIN — DIPHENHYDRAMINE HYDROCHLORIDE 25 MG: 25 CAPSULE ORAL at 22:57

## 2018-05-23 RX ADMIN — BETAMETHASONE SODIUM PHOSPHATE AND BETAMETHASONE ACETATE 12 MG: 3; 3 INJECTION, SUSPENSION INTRA-ARTICULAR; INTRALESIONAL; INTRAMUSCULAR at 19:00

## 2018-05-23 RX ADMIN — MONTELUKAST SODIUM 10 MG: 10 TABLET, FILM COATED ORAL at 20:31

## 2018-05-23 RX ADMIN — CETIRIZINE HYDROCHLORIDE 10 MG: 10 TABLET, FILM COATED ORAL at 08:01

## 2018-05-23 RX ADMIN — FLUNISOLIDE 1 SPRAY: 0.25 SOLUTION NASAL at 08:02

## 2018-05-23 RX ADMIN — METHYLDOPA 500 MG: 250 TABLET ORAL at 14:41

## 2018-05-23 RX ADMIN — DOCUSATE SODIUM 100 MG: 100 CAPSULE, LIQUID FILLED ORAL at 08:00

## 2018-05-23 RX ADMIN — METHYLDOPA 500 MG: 250 TABLET ORAL at 06:18

## 2018-05-23 RX ADMIN — INSULIN HUMAN 38 UNITS: 100 INJECTION, SUSPENSION SUBCUTANEOUS at 20:32

## 2018-05-23 NOTE — PROGRESS NOTES
2018  HD:0  37 y.o. yo female  at 36w4d    Subjective   Janell denies MARTINEZ, scotomata or RUQ pain.  .       Objective   Temp: Temp:  [97.6 °F (36.4 °C)-98.8 °F (37.1 °C)] 97.6 °F (36.4 °C) Temp src: Oral   BP: BP: (121-144)/(69-83) 123/78        Pulse: Heart Rate:  [75-99] 80  RR: Resp:  [16-18] 16    General:  nad   Abdomen: Gravid, nontender         Lab Results   Component Value Date    WBC 15.07 (H) 2018    HGB 11.7 2018    HCT 35.6 2018    MCV 88.3 2018     2018       Results from last 7 days  Lab Units 18  1746   AST (SGOT) U/L 16     Results from last 7 days  Lab Units 18  1746   ALT (SGPT) U/L 17      Results from last 7 days  Lab Units 18  1746   CREATININE mg/dL 0.70      Results from last 7 days  Lab Units 18  1746   BILIRUBIN mg/dL 0.3         Assessment  1.   37 y.o. yo female  at 36w4d  2.  CHTN with SI PIH  3.  Asthma  4.  A2DM    Plan  1. Cont current hospital care.   2.   If severe range BP deliver or Saturday at 37 weeks.    This note has been electronically signed.    Heike Bailey MD  May 23, 2018

## 2018-05-24 LAB
GLUCOSE BLDC GLUCOMTR-MCNC: 106 MG/DL (ref 70–130)
GLUCOSE BLDC GLUCOMTR-MCNC: 106 MG/DL (ref 70–130)
GLUCOSE BLDC GLUCOMTR-MCNC: 112 MG/DL (ref 70–130)
GLUCOSE BLDC GLUCOMTR-MCNC: 197 MG/DL (ref 70–130)
GLUCOSE BLDC GLUCOMTR-MCNC: 99 MG/DL (ref 70–130)

## 2018-05-24 PROCEDURE — 82962 GLUCOSE BLOOD TEST: CPT

## 2018-05-24 PROCEDURE — 59025 FETAL NON-STRESS TEST: CPT

## 2018-05-24 PROCEDURE — 94640 AIRWAY INHALATION TREATMENT: CPT

## 2018-05-24 PROCEDURE — 63710000001 DIPHENHYDRAMINE PER 50 MG: Performed by: OBSTETRICS & GYNECOLOGY

## 2018-05-24 RX ADMIN — INSULIN HUMAN 38 UNITS: 100 INJECTION, SUSPENSION SUBCUTANEOUS at 21:13

## 2018-05-24 RX ADMIN — METHYLDOPA 500 MG: 250 TABLET ORAL at 14:35

## 2018-05-24 RX ADMIN — DIPHENHYDRAMINE HYDROCHLORIDE 25 MG: 25 CAPSULE ORAL at 22:09

## 2018-05-24 RX ADMIN — METHYLDOPA 500 MG: 250 TABLET ORAL at 22:09

## 2018-05-24 RX ADMIN — ALBUTEROL SULFATE 2.5 MG: 2.5 SOLUTION RESPIRATORY (INHALATION) at 14:03

## 2018-05-24 RX ADMIN — CETIRIZINE HYDROCHLORIDE 10 MG: 10 TABLET, FILM COATED ORAL at 08:37

## 2018-05-24 RX ADMIN — METHYLDOPA 500 MG: 250 TABLET ORAL at 06:34

## 2018-05-24 RX ADMIN — ASPIRIN 81 MG 81 MG: 81 TABLET ORAL at 08:37

## 2018-05-24 RX ADMIN — FLUNISOLIDE 1 SPRAY: 0.25 SOLUTION NASAL at 08:40

## 2018-05-24 RX ADMIN — DOCUSATE SODIUM 100 MG: 100 CAPSULE, LIQUID FILLED ORAL at 08:38

## 2018-05-24 RX ADMIN — MONTELUKAST SODIUM 10 MG: 10 TABLET, FILM COATED ORAL at 22:09

## 2018-05-24 NOTE — PLAN OF CARE
Problem: Patient Care Overview  Goal: Plan of Care Review  Outcome: Ongoing (interventions implemented as appropriate)   05/24/18 0657   Coping/Psychosocial   Plan of Care Reviewed With patient   Plan of Care Review   Progress no change

## 2018-05-24 NOTE — PLAN OF CARE
Problem: Diabetes in Pregnancy (Adult,Obstetrics,Pediatric)  Intervention: Promote/Monitor Maternal/Fetal Wellbeing   05/24/18 0857   Reproductive Interventions   Fetal Wellbeing Promotion fetal heart rate monitored;uterine contraction activity assessed     Intervention: Support Psychosocial Response to Complicated Pregnancy   05/24/18 0857   Coping/Psychosocial Interventions   Supportive Measures active listening utilized;decision-making supported;positive reinforcement provided   Psychosocial Support   Family/Support System Care involvement promoted     Intervention: Optimize Glycemic Control   05/24/18 0857   Nutrition Interventions   Glycemic Management blood glucose monitoring;carbohydrate replacement provided;oral hydration promoted;supplemental insulin given       Goal: Signs and Symptoms of Listed Potential Problems Will be Absent, Minimized or Managed (Diabetes in Pregnancy)  Outcome: Ongoing (interventions implemented as appropriate)   05/24/18 0857   Goal/Outcome Evaluation   Problems Assessed (Diabetes in Pregnancy) all   Problems Present (Diabetes with Pregnancy) none       Problem: Hypertensive Disorders in Pregnancy (Adult,Obstetrics,Pediatric)  Intervention: Monitor Neurologic Status and Fluid Balance   05/24/18 0857   Safety Management   Medication Review/Management medications reviewed;high risk medications identified;pharmacy consulted     Intervention: Closely Monitor Maternal Fluid Balance   05/24/18 0857   Edema   Dependent Edema 0 (None Present)     Intervention: Promote/Monitor Maternal/Fetal Wellbeing   05/24/18 0857   Reproductive Interventions   Fetal Wellbeing Promotion fetal heart rate monitored;uterine contraction activity assessed     Intervention: Monitor/Manage Seizure Activity   05/24/18 0857   Safety Interventions   Seizure Precautions clutter-free environment maintained;emergency equipment at bedside       Goal: Signs and Symptoms of Listed Potential Problems Will be Absent,  Minimized or Managed (Hypertensive Disorders in Pregnancy)  Outcome: Ongoing (interventions implemented as appropriate)   05/24/18 0857   Goal/Outcome Evaluation   Problems Assessed (Hypertensive Disorders in Pregnancy) all   Problems Present (Hypertensive/in PG) none

## 2018-05-25 LAB
ALP SERPL-CCNC: 61 U/L (ref 25–100)
ALT SERPL W P-5'-P-CCNC: 19 U/L (ref 7–40)
AST SERPL-CCNC: 16 U/L (ref 0–33)
BILIRUB SERPL-MCNC: 0.4 MG/DL (ref 0.3–1.2)
CREAT BLD-MCNC: 0.7 MG/DL (ref 0.6–1.3)
DEPRECATED RDW RBC AUTO: 46.8 FL (ref 37–54)
ERYTHROCYTE [DISTWIDTH] IN BLOOD BY AUTOMATED COUNT: 14.4 % (ref 11.3–14.5)
GLUCOSE BLDC GLUCOMTR-MCNC: 110 MG/DL (ref 70–130)
GLUCOSE BLDC GLUCOMTR-MCNC: 127 MG/DL (ref 70–130)
GLUCOSE BLDC GLUCOMTR-MCNC: 133 MG/DL (ref 70–130)
GLUCOSE BLDC GLUCOMTR-MCNC: 87 MG/DL (ref 70–130)
HCT VFR BLD AUTO: 35 % (ref 34.5–44)
HGB BLD-MCNC: 11.4 G/DL (ref 11.5–15.5)
LDH SERPL-CCNC: 134 U/L (ref 120–246)
MCH RBC QN AUTO: 29 PG (ref 27–31)
MCHC RBC AUTO-ENTMCNC: 32.6 G/DL (ref 32–36)
MCV RBC AUTO: 89.1 FL (ref 80–99)
PLATELET # BLD AUTO: 362 10*3/MM3 (ref 150–450)
PMV BLD AUTO: 10.8 FL (ref 6–12)
RBC # BLD AUTO: 3.93 10*6/MM3 (ref 3.89–5.14)
URATE SERPL-MCNC: 5.2 MG/DL (ref 3.1–7.8)
WBC NRBC COR # BLD: 13.47 10*3/MM3 (ref 3.5–10.8)

## 2018-05-25 PROCEDURE — 83615 LACTATE (LD) (LDH) ENZYME: CPT | Performed by: OBSTETRICS & GYNECOLOGY

## 2018-05-25 PROCEDURE — 85027 COMPLETE CBC AUTOMATED: CPT | Performed by: OBSTETRICS & GYNECOLOGY

## 2018-05-25 PROCEDURE — 84460 ALANINE AMINO (ALT) (SGPT): CPT | Performed by: OBSTETRICS & GYNECOLOGY

## 2018-05-25 PROCEDURE — 94640 AIRWAY INHALATION TREATMENT: CPT

## 2018-05-25 PROCEDURE — 84550 ASSAY OF BLOOD/URIC ACID: CPT | Performed by: OBSTETRICS & GYNECOLOGY

## 2018-05-25 PROCEDURE — 82962 GLUCOSE BLOOD TEST: CPT

## 2018-05-25 PROCEDURE — 63710000001 DIPHENHYDRAMINE PER 50 MG: Performed by: OBSTETRICS & GYNECOLOGY

## 2018-05-25 PROCEDURE — 82565 ASSAY OF CREATININE: CPT | Performed by: OBSTETRICS & GYNECOLOGY

## 2018-05-25 PROCEDURE — 82247 BILIRUBIN TOTAL: CPT | Performed by: OBSTETRICS & GYNECOLOGY

## 2018-05-25 PROCEDURE — 59025 FETAL NON-STRESS TEST: CPT

## 2018-05-25 PROCEDURE — 84075 ASSAY ALKALINE PHOSPHATASE: CPT | Performed by: OBSTETRICS & GYNECOLOGY

## 2018-05-25 PROCEDURE — 84450 TRANSFERASE (AST) (SGOT): CPT | Performed by: OBSTETRICS & GYNECOLOGY

## 2018-05-25 RX ADMIN — INSULIN HUMAN 38 UNITS: 100 INJECTION, SUSPENSION SUBCUTANEOUS at 21:53

## 2018-05-25 RX ADMIN — METHYLDOPA 500 MG: 250 TABLET ORAL at 22:56

## 2018-05-25 RX ADMIN — METHYLDOPA 500 MG: 250 TABLET ORAL at 15:17

## 2018-05-25 RX ADMIN — ALBUTEROL SULFATE 2.5 MG: 2.5 SOLUTION RESPIRATORY (INHALATION) at 15:49

## 2018-05-25 RX ADMIN — CETIRIZINE HYDROCHLORIDE 10 MG: 10 TABLET, FILM COATED ORAL at 09:10

## 2018-05-25 RX ADMIN — DOCUSATE SODIUM 100 MG: 100 CAPSULE, LIQUID FILLED ORAL at 09:10

## 2018-05-25 RX ADMIN — METHYLDOPA 500 MG: 250 TABLET ORAL at 05:43

## 2018-05-25 RX ADMIN — MONTELUKAST SODIUM 10 MG: 10 TABLET, FILM COATED ORAL at 21:53

## 2018-05-25 RX ADMIN — FLUNISOLIDE 1 SPRAY: 0.25 SOLUTION NASAL at 09:12

## 2018-05-25 RX ADMIN — DIPHENHYDRAMINE HYDROCHLORIDE 25 MG: 25 CAPSULE ORAL at 22:08

## 2018-05-25 NOTE — PROGRESS NOTES
2018  HD:2  37 y.o. yo female  at 36w6d    Subjective   Janell denies new complaints.         Objective   Temp: Temp:  [97.8 °F (36.6 °C)-98.4 °F (36.9 °C)] 97.8 °F (36.6 °C) Temp src: Oral   BP: BP: (112-149)/(68-76) 116/73        Pulse: Heart Rate:  [76-85] 77  RR: Resp:  [16-20] 18    General:  nad   Abdomen: Gravid, nontender   NST Rx     Lab Results   Component Value Date    WBC 15.07 (H) 2018    HGB 11.7 2018    HCT 35.6 2018    MCV 88.3 2018     2018       Results from last 7 days  Lab Units 18  1746   AST (SGOT) U/L 16     Results from last 7 days  Lab Units 18  1746   ALT (SGPT) U/L 17      Results from last 7 days  Lab Units 18  1746   CREATININE mg/dL 0.70      Results from last 7 days  Lab Units 18  1746   BILIRUBIN mg/dL 0.3         Assessment  1.   37 y.o. yo female  at 36w6d  2.   CHTN with prob. SI PIH  3.  Asthma  4.  GERD  5.  A2 DM    Plan  1. Cont current hospital care.   2.   FSBS fasting this am 98 despite steroids.  3.  Plan repeat C/S and BTL in am .    4.  NPO after Midnight.    This note has been electronically signed.    Heike Bailey MD  May 25, 2018

## 2018-05-25 NOTE — PLAN OF CARE
Problem: Patient Care Overview  Goal: Plan of Care Review  Outcome: Ongoing (interventions implemented as appropriate)   05/25/18 0806   Coping/Psychosocial   Plan of Care Reviewed With patient   Plan of Care Review   Progress improving

## 2018-05-25 NOTE — PROGRESS NOTES
"Adult Nutrition  Assessment/PES    Patient Name:  Janell Terrell  YOB: 1980  MRN: [id number]  Admit Date:  5/22/2018    Assessment Date:  5/25/2018    Comments:            Adult Nutrition Assessment     Row Name 05/25/18 1150       Anthropometrics    Height Method measured    Height 165.1 cm (65\")       Admit Weight    Admit Weight Method measured    Admit Weight 106 kg (233 lb 11 oz)       Ideal Body Weight (IBW)    Ideal Body Weight (IBW) (kg) 57.29       IBW Adjustment, Para/Tetraplegia    5% Adjustment, Para (IBW) 54.43    10% Adjustment, Para (IBW) 51.56    10% Adjustment, Tetra (IBW) 51.56    15% Adjustment, Tetra (IBW) 48.7       Labs/Procedures/Meds    Lab Results Reviewed reviewed       Nutrition Prescription PO    Current PO Diet Regular    Common Modifiers Consistent Carbohydrate       PO Evaluation    Number of Days PO Intake Evaluated Insufficient Data    Row Name 05/25/18 1149       Reason for Assessment    Reason For Assessment identified at risk by screening criteria    Diagnosis other (see comments)   cHTN, DM2, asthma, GERD    Identified At Risk by Screening Criteria other (see comments)   DM2          Problem/Interventions:        Problem 1     Row Name 05/25/18 1150       Nutrition Diagnoses Problem 1    Problem 1 Impaired Nutrient Utilization    Etiology (related to) Medical Diagnosis    Endocrine DM2    Signs/Symptoms (evidenced by) Biochemical    Labs Reviewed Done    Specific Labs Noted Glucose                    Intervention Goal     Row Name 05/25/18 1151       Intervention Goal    General Nutrition support treatment    Weight Appropriate weight gain            Nutrition Intervention     Row Name 05/25/18 1151       Nutrition Intervention    RD/Tech Action Follow Tx progress;Care plan reviewd              Education/Evaluation     Row Name 05/25/18 1151       Monitor/Evaluation    Monitor Per protocol;PO intake;Pertinent labs;Weight        Electronically signed by:  Ashely LOVELL" Benton, HELLEN  05/25/18 11:51 AM   Time Spent:  20 minutes

## 2018-05-25 NOTE — PROGRESS NOTES
2018  HD:2  37 y.o. yo female  at 36w6d    Subjective   Janell has no new complaints.       Objective   Temp: Temp:  [97.8 °F (36.6 °C)-98.4 °F (36.9 °C)] 97.8 °F (36.6 °C) Temp src: Oral   BP: BP: (112-149)/(68-76) 116/73        Pulse: Heart Rate:  [76-85] 77  RR: Resp:  [16-20] 18    General:  nad   Abdomen: Gravid, nontender         Lab Results   Component Value Date    WBC 15.07 (H) 2018    HGB 11.7 2018    HCT 35.6 2018    MCV 88.3 2018     2018       Results from last 7 days  Lab Units 18  1746   AST (SGOT) U/L 16     Results from last 7 days  Lab Units 18  1746   ALT (SGPT) U/L 17      Results from last 7 days  Lab Units 18  1746   CREATININE mg/dL 0.70      Results from last 7 days  Lab Units 18  1746   BILIRUBIN mg/dL 0.3         Assessment  1.   37 y.o. yo female  at 36w6d  2.   CHTN with SI PIH  3. A2 DM\  4.  Asthma  Plan  1. Cont current hospital care.   2.    S/P steroids with sliding scale for BS coverage.  3.  Plan Repeat c/s on     This note has been electronically signed.    Heike Bailey MD  May 25, 2018

## 2018-05-26 ENCOUNTER — ANESTHESIA (OUTPATIENT)
Dept: LABOR AND DELIVERY | Facility: HOSPITAL | Age: 38
End: 2018-05-26

## 2018-05-26 ENCOUNTER — ANESTHESIA EVENT (OUTPATIENT)
Dept: LABOR AND DELIVERY | Facility: HOSPITAL | Age: 38
End: 2018-05-26

## 2018-05-26 LAB
ABO GROUP BLD: NORMAL
BLD GP AB SCN SERPL QL: NEGATIVE
GLUCOSE BLDC GLUCOMTR-MCNC: 70 MG/DL (ref 70–130)
GLUCOSE BLDC GLUCOMTR-MCNC: 71 MG/DL (ref 70–130)
GLUCOSE BLDC GLUCOMTR-MCNC: 78 MG/DL (ref 70–130)
GLUCOSE BLDC GLUCOMTR-MCNC: 86 MG/DL (ref 70–130)
GLUCOSE BLDC GLUCOMTR-MCNC: 89 MG/DL (ref 70–130)
RH BLD: POSITIVE
T&S EXPIRATION DATE: NORMAL

## 2018-05-26 PROCEDURE — 25010000002 ONDANSETRON PER 1 MG: Performed by: ANESTHESIOLOGY

## 2018-05-26 PROCEDURE — 25010000002 PHENYLEPHRINE PER 1 ML: Performed by: ANESTHESIOLOGY

## 2018-05-26 PROCEDURE — 25010000002 MIDAZOLAM PER 1 MG: Performed by: ANESTHESIOLOGY

## 2018-05-26 PROCEDURE — C1755 CATHETER, INTRASPINAL: HCPCS | Performed by: ANESTHESIOLOGY

## 2018-05-26 PROCEDURE — 86900 BLOOD TYPING SEROLOGIC ABO: CPT | Performed by: OBSTETRICS & GYNECOLOGY

## 2018-05-26 PROCEDURE — 94799 UNLISTED PULMONARY SVC/PX: CPT

## 2018-05-26 PROCEDURE — 25010000003 MORPHINE PER 10 MG: Performed by: ANESTHESIOLOGY

## 2018-05-26 PROCEDURE — 82962 GLUCOSE BLOOD TEST: CPT

## 2018-05-26 PROCEDURE — 0UB70ZZ EXCISION OF BILATERAL FALLOPIAN TUBES, OPEN APPROACH: ICD-10-PCS | Performed by: OBSTETRICS & GYNECOLOGY

## 2018-05-26 PROCEDURE — 86850 RBC ANTIBODY SCREEN: CPT | Performed by: OBSTETRICS & GYNECOLOGY

## 2018-05-26 PROCEDURE — 88302 TISSUE EXAM BY PATHOLOGIST: CPT | Performed by: OBSTETRICS & GYNECOLOGY

## 2018-05-26 PROCEDURE — 25010000002 DIPHENHYDRAMINE PER 50 MG: Performed by: OBSTETRICS & GYNECOLOGY

## 2018-05-26 PROCEDURE — 86901 BLOOD TYPING SEROLOGIC RH(D): CPT | Performed by: OBSTETRICS & GYNECOLOGY

## 2018-05-26 PROCEDURE — 25010000003 CEFAZOLIN IN DEXTROSE 2-4 GM/100ML-% SOLUTION: Performed by: OBSTETRICS & GYNECOLOGY

## 2018-05-26 PROCEDURE — 25010000002 FENTANYL CITRATE (PF) 100 MCG/2ML SOLUTION: Performed by: ANESTHESIOLOGY

## 2018-05-26 PROCEDURE — 59025 FETAL NON-STRESS TEST: CPT

## 2018-05-26 PROCEDURE — 25010000002 HYDROMORPHONE PER 4 MG: Performed by: ANESTHESIOLOGY

## 2018-05-26 PROCEDURE — 94640 AIRWAY INHALATION TREATMENT: CPT

## 2018-05-26 PROCEDURE — 94760 N-INVAS EAR/PLS OXIMETRY 1: CPT

## 2018-05-26 RX ORDER — METOCLOPRAMIDE HYDROCHLORIDE 5 MG/ML
10 INJECTION INTRAMUSCULAR; INTRAVENOUS ONCE AS NEEDED
Status: DISCONTINUED | OUTPATIENT
Start: 2018-05-26 | End: 2018-05-28 | Stop reason: HOSPADM

## 2018-05-26 RX ORDER — IPRATROPIUM BROMIDE AND ALBUTEROL SULFATE 2.5; .5 MG/3ML; MG/3ML
3 SOLUTION RESPIRATORY (INHALATION)
Status: DISCONTINUED | OUTPATIENT
Start: 2018-05-26 | End: 2018-05-28 | Stop reason: HOSPADM

## 2018-05-26 RX ORDER — NICOTINE POLACRILEX 4 MG
15 LOZENGE BUCCAL
Status: DISCONTINUED | OUTPATIENT
Start: 2018-05-26 | End: 2018-05-28 | Stop reason: HOSPADM

## 2018-05-26 RX ORDER — CARBOPROST TROMETHAMINE 250 UG/ML
250 INJECTION, SOLUTION INTRAMUSCULAR AS NEEDED
Status: DISCONTINUED | OUTPATIENT
Start: 2018-05-26 | End: 2018-05-28 | Stop reason: HOSPADM

## 2018-05-26 RX ORDER — MISOPROSTOL 200 UG/1
800 TABLET ORAL AS NEEDED
Status: DISCONTINUED | OUTPATIENT
Start: 2018-05-26 | End: 2018-05-28 | Stop reason: HOSPADM

## 2018-05-26 RX ORDER — ACETAMINOPHEN 325 MG/1
650 TABLET ORAL ONCE AS NEEDED
Status: DISCONTINUED | OUTPATIENT
Start: 2018-05-26 | End: 2018-05-28 | Stop reason: HOSPADM

## 2018-05-26 RX ORDER — IPRATROPIUM BROMIDE AND ALBUTEROL SULFATE 2.5; .5 MG/3ML; MG/3ML
3 SOLUTION RESPIRATORY (INHALATION) ONCE
Status: COMPLETED | OUTPATIENT
Start: 2018-05-26 | End: 2018-05-26

## 2018-05-26 RX ORDER — IBUPROFEN 600 MG/1
600 TABLET ORAL EVERY 6 HOURS PRN
Status: DISCONTINUED | OUTPATIENT
Start: 2018-05-26 | End: 2018-05-28 | Stop reason: HOSPADM

## 2018-05-26 RX ORDER — TRISODIUM CITRATE DIHYDRATE AND CITRIC ACID MONOHYDRATE 500; 334 MG/5ML; MG/5ML
30 SOLUTION ORAL ONCE
Status: DISCONTINUED | OUTPATIENT
Start: 2018-05-26 | End: 2018-05-26

## 2018-05-26 RX ORDER — DIPHENHYDRAMINE HYDROCHLORIDE 50 MG/ML
25 INJECTION INTRAMUSCULAR; INTRAVENOUS EVERY 6 HOURS PRN
Status: DISCONTINUED | OUTPATIENT
Start: 2018-05-26 | End: 2018-05-28

## 2018-05-26 RX ORDER — OXYTOCIN-SODIUM CHLORIDE 0.9% IV SOLN 30 UNIT/500ML 30-0.9/5 UT/ML-%
85 SOLUTION INTRAVENOUS ONCE
Status: DISCONTINUED | OUTPATIENT
Start: 2018-05-26 | End: 2018-05-26

## 2018-05-26 RX ORDER — FENTANYL CITRATE 50 UG/ML
INJECTION, SOLUTION INTRAMUSCULAR; INTRAVENOUS AS NEEDED
Status: DISCONTINUED | OUTPATIENT
Start: 2018-05-26 | End: 2018-05-26 | Stop reason: SURG

## 2018-05-26 RX ORDER — FLUNISOLIDE 0.25 MG/ML
1 SOLUTION NASAL DAILY
Status: DISCONTINUED | OUTPATIENT
Start: 2018-05-27 | End: 2018-05-28 | Stop reason: HOSPADM

## 2018-05-26 RX ORDER — OXYCODONE AND ACETAMINOPHEN 7.5; 325 MG/1; MG/1
1 TABLET ORAL EVERY 4 HOURS PRN
Status: DISCONTINUED | OUTPATIENT
Start: 2018-05-26 | End: 2018-05-28

## 2018-05-26 RX ORDER — MONTELUKAST SODIUM 10 MG/1
10 TABLET ORAL NIGHTLY
Status: DISCONTINUED | OUTPATIENT
Start: 2018-05-26 | End: 2018-05-28 | Stop reason: HOSPADM

## 2018-05-26 RX ORDER — CETIRIZINE HYDROCHLORIDE 10 MG/1
10 TABLET ORAL DAILY
Status: DISCONTINUED | OUTPATIENT
Start: 2018-05-27 | End: 2018-05-28 | Stop reason: SDUPTHER

## 2018-05-26 RX ORDER — METHYLERGONOVINE MALEATE 0.2 MG/ML
200 INJECTION INTRAVENOUS ONCE AS NEEDED
Status: DISCONTINUED | OUTPATIENT
Start: 2018-05-26 | End: 2018-05-28 | Stop reason: HOSPADM

## 2018-05-26 RX ORDER — MIDAZOLAM HYDROCHLORIDE 1 MG/ML
INJECTION INTRAMUSCULAR; INTRAVENOUS AS NEEDED
Status: DISCONTINUED | OUTPATIENT
Start: 2018-05-26 | End: 2018-05-26 | Stop reason: SURG

## 2018-05-26 RX ORDER — BUPIVACAINE HYDROCHLORIDE 7.5 MG/ML
INJECTION, SOLUTION EPIDURAL; RETROBULBAR AS NEEDED
Status: DISCONTINUED | OUTPATIENT
Start: 2018-05-26 | End: 2018-05-26 | Stop reason: SURG

## 2018-05-26 RX ORDER — HYDROMORPHONE HYDROCHLORIDE 1 MG/ML
0.5 INJECTION, SOLUTION INTRAMUSCULAR; INTRAVENOUS; SUBCUTANEOUS
Status: COMPLETED | OUTPATIENT
Start: 2018-05-26 | End: 2018-05-26

## 2018-05-26 RX ORDER — LIDOCAINE HYDROCHLORIDE AND EPINEPHRINE 20; 5 MG/ML; UG/ML
INJECTION, SOLUTION EPIDURAL; INFILTRATION; INTRACAUDAL; PERINEURAL AS NEEDED
Status: DISCONTINUED | OUTPATIENT
Start: 2018-05-26 | End: 2018-05-26 | Stop reason: SURG

## 2018-05-26 RX ORDER — DEXTROSE MONOHYDRATE 25 G/50ML
25 INJECTION, SOLUTION INTRAVENOUS
Status: DISCONTINUED | OUTPATIENT
Start: 2018-05-26 | End: 2018-05-28 | Stop reason: HOSPADM

## 2018-05-26 RX ORDER — ONDANSETRON 2 MG/ML
4 INJECTION INTRAMUSCULAR; INTRAVENOUS ONCE
Status: DISCONTINUED | OUTPATIENT
Start: 2018-05-26 | End: 2018-05-28 | Stop reason: HOSPADM

## 2018-05-26 RX ORDER — IBUPROFEN 600 MG/1
600 TABLET ORAL ONCE AS NEEDED
Status: DISCONTINUED | OUTPATIENT
Start: 2018-05-26 | End: 2018-05-28

## 2018-05-26 RX ORDER — SODIUM CHLORIDE, SODIUM LACTATE, POTASSIUM CHLORIDE, CALCIUM CHLORIDE 600; 310; 30; 20 MG/100ML; MG/100ML; MG/100ML; MG/100ML
125 INJECTION, SOLUTION INTRAVENOUS CONTINUOUS
Status: DISCONTINUED | OUTPATIENT
Start: 2018-05-26 | End: 2018-05-27

## 2018-05-26 RX ORDER — DOCUSATE SODIUM 100 MG/1
100 CAPSULE, LIQUID FILLED ORAL 2 TIMES DAILY PRN
Status: DISCONTINUED | OUTPATIENT
Start: 2018-05-26 | End: 2018-05-28 | Stop reason: HOSPADM

## 2018-05-26 RX ORDER — ONDANSETRON 2 MG/ML
INJECTION INTRAMUSCULAR; INTRAVENOUS AS NEEDED
Status: DISCONTINUED | OUTPATIENT
Start: 2018-05-26 | End: 2018-05-26 | Stop reason: SURG

## 2018-05-26 RX ORDER — OXYTOCIN 10 [USP'U]/ML
INJECTION, SOLUTION INTRAMUSCULAR; INTRAVENOUS AS NEEDED
Status: DISCONTINUED | OUTPATIENT
Start: 2018-05-26 | End: 2018-05-26 | Stop reason: SURG

## 2018-05-26 RX ORDER — METHYLDOPA 250 MG/1
500 TABLET, FILM COATED ORAL EVERY 8 HOURS SCHEDULED
Status: DISCONTINUED | OUTPATIENT
Start: 2018-05-26 | End: 2018-05-28

## 2018-05-26 RX ORDER — CEFAZOLIN SODIUM 2 G/100ML
2 INJECTION, SOLUTION INTRAVENOUS ONCE
Status: COMPLETED | OUTPATIENT
Start: 2018-05-26 | End: 2018-05-26

## 2018-05-26 RX ORDER — SODIUM CHLORIDE, SODIUM LACTATE, POTASSIUM CHLORIDE, CALCIUM CHLORIDE 600; 310; 30; 20 MG/100ML; MG/100ML; MG/100ML; MG/100ML
INJECTION, SOLUTION INTRAVENOUS CONTINUOUS PRN
Status: DISCONTINUED | OUTPATIENT
Start: 2018-05-26 | End: 2018-05-26 | Stop reason: SURG

## 2018-05-26 RX ORDER — OXYCODONE AND ACETAMINOPHEN 10; 325 MG/1; MG/1
1 TABLET ORAL EVERY 4 HOURS PRN
Status: DISCONTINUED | OUTPATIENT
Start: 2018-05-26 | End: 2018-05-28 | Stop reason: HOSPADM

## 2018-05-26 RX ORDER — MORPHINE SULFATE 0.5 MG/ML
INJECTION, SOLUTION EPIDURAL; INTRATHECAL; INTRAVENOUS AS NEEDED
Status: DISCONTINUED | OUTPATIENT
Start: 2018-05-26 | End: 2018-05-26 | Stop reason: SURG

## 2018-05-26 RX ORDER — LIDOCAINE HYDROCHLORIDE 10 MG/ML
5 INJECTION, SOLUTION EPIDURAL; INFILTRATION; INTRACAUDAL; PERINEURAL AS NEEDED
Status: DISCONTINUED | OUTPATIENT
Start: 2018-05-26 | End: 2018-05-28

## 2018-05-26 RX ORDER — SODIUM CHLORIDE 0.9 % (FLUSH) 0.9 %
1-10 SYRINGE (ML) INJECTION AS NEEDED
Status: DISCONTINUED | OUTPATIENT
Start: 2018-05-26 | End: 2018-05-28

## 2018-05-26 RX ORDER — ONDANSETRON 4 MG/1
4 TABLET, FILM COATED ORAL EVERY 8 HOURS PRN
Status: DISCONTINUED | OUTPATIENT
Start: 2018-05-26 | End: 2018-05-28 | Stop reason: HOSPADM

## 2018-05-26 RX ORDER — OXYTOCIN-SODIUM CHLORIDE 0.9% IV SOLN 30 UNIT/500ML 30-0.9/5 UT/ML-%
650 SOLUTION INTRAVENOUS ONCE
Status: DISCONTINUED | OUTPATIENT
Start: 2018-05-26 | End: 2018-05-26

## 2018-05-26 RX ADMIN — FENTANYL CITRATE 25 MCG: 50 INJECTION, SOLUTION INTRAMUSCULAR; INTRAVENOUS at 12:15

## 2018-05-26 RX ADMIN — FENTANYL CITRATE 25 MCG: 50 INJECTION, SOLUTION INTRAMUSCULAR; INTRAVENOUS at 11:35

## 2018-05-26 RX ADMIN — SODIUM CHLORIDE, POTASSIUM CHLORIDE, SODIUM LACTATE AND CALCIUM CHLORIDE 125 ML/HR: 600; 310; 30; 20 INJECTION, SOLUTION INTRAVENOUS at 17:41

## 2018-05-26 RX ADMIN — OXYCODONE HYDROCHLORIDE AND ACETAMINOPHEN 1 TABLET: 7.5; 325 TABLET ORAL at 19:51

## 2018-05-26 RX ADMIN — SODIUM CHLORIDE, POTASSIUM CHLORIDE, SODIUM LACTATE AND CALCIUM CHLORIDE: 600; 310; 30; 20 INJECTION, SOLUTION INTRAVENOUS at 11:22

## 2018-05-26 RX ADMIN — PHENYLEPHRINE HYDROCHLORIDE 200 MCG: 10 INJECTION INTRAVENOUS at 12:03

## 2018-05-26 RX ADMIN — BUPIVACAINE HYDROCHLORIDE 1.7 ML: 7.5 INJECTION, SOLUTION EPIDURAL; RETROBULBAR at 11:35

## 2018-05-26 RX ADMIN — MORPHINE SULFATE 0.2 MG: 0.5 INJECTION, SOLUTION EPIDURAL; INTRATHECAL; INTRAVENOUS at 11:35

## 2018-05-26 RX ADMIN — METHYLDOPA 500 MG: 250 TABLET ORAL at 22:22

## 2018-05-26 RX ADMIN — DIPHENHYDRAMINE HYDROCHLORIDE 25 MG: 50 INJECTION INTRAMUSCULAR; INTRAVENOUS at 23:11

## 2018-05-26 RX ADMIN — CETIRIZINE HYDROCHLORIDE 10 MG: 10 TABLET, FILM COATED ORAL at 07:54

## 2018-05-26 RX ADMIN — MORPHINE SULFATE 1 MG: 0.5 INJECTION, SOLUTION EPIDURAL; INTRATHECAL; INTRAVENOUS at 12:25

## 2018-05-26 RX ADMIN — OXYTOCIN 20 UNITS: 10 INJECTION, SOLUTION INTRAMUSCULAR; INTRAVENOUS at 12:19

## 2018-05-26 RX ADMIN — METHYLDOPA 500 MG: 250 TABLET ORAL at 16:11

## 2018-05-26 RX ADMIN — IBUPROFEN 600 MG: 600 TABLET ORAL at 16:10

## 2018-05-26 RX ADMIN — SODIUM CHLORIDE, POTASSIUM CHLORIDE, SODIUM LACTATE AND CALCIUM CHLORIDE 1000 ML: 600; 310; 30; 20 INJECTION, SOLUTION INTRAVENOUS at 10:13

## 2018-05-26 RX ADMIN — LIDOCAINE HYDROCHLORIDE,EPINEPHRINE BITARTRATE 10 ML: 20; .005 INJECTION, SOLUTION EPIDURAL; INFILTRATION; INTRACAUDAL; PERINEURAL at 12:02

## 2018-05-26 RX ADMIN — IPRATROPIUM BROMIDE AND ALBUTEROL SULFATE 3 ML: .5; 3 SOLUTION RESPIRATORY (INHALATION) at 13:53

## 2018-05-26 RX ADMIN — IBUPROFEN 600 MG: 600 TABLET ORAL at 22:23

## 2018-05-26 RX ADMIN — FENTANYL CITRATE 50 MCG: 50 INJECTION, SOLUTION INTRAMUSCULAR; INTRAVENOUS at 12:05

## 2018-05-26 RX ADMIN — SODIUM CITRATE AND CITRIC ACID MONOHYDRATE 30 ML: 500; 334 SOLUTION ORAL at 11:11

## 2018-05-26 RX ADMIN — ONDANSETRON 4 MG: 2 INJECTION INTRAMUSCULAR; INTRAVENOUS at 12:05

## 2018-05-26 RX ADMIN — IPRATROPIUM BROMIDE AND ALBUTEROL SULFATE 3 ML: 2.5; .5 SOLUTION RESPIRATORY (INHALATION) at 19:49

## 2018-05-26 RX ADMIN — CEFAZOLIN SODIUM 2 G: 2 INJECTION, SOLUTION INTRAVENOUS at 11:12

## 2018-05-26 RX ADMIN — HYDROMORPHONE HYDROCHLORIDE 0.5 MG: 1 INJECTION, SOLUTION INTRAMUSCULAR; INTRAVENOUS; SUBCUTANEOUS at 16:11

## 2018-05-26 RX ADMIN — SODIUM CHLORIDE, POTASSIUM CHLORIDE, SODIUM LACTATE AND CALCIUM CHLORIDE 125 ML/HR: 600; 310; 30; 20 INJECTION, SOLUTION INTRAVENOUS at 09:05

## 2018-05-26 RX ADMIN — OXYTOCIN 20 UNITS: 10 INJECTION, SOLUTION INTRAMUSCULAR; INTRAVENOUS at 12:35

## 2018-05-26 RX ADMIN — HYDROMORPHONE HYDROCHLORIDE 0.5 MG: 1 INJECTION, SOLUTION INTRAMUSCULAR; INTRAVENOUS; SUBCUTANEOUS at 14:45

## 2018-05-26 RX ADMIN — DIPHENHYDRAMINE HYDROCHLORIDE 25 MG: 50 INJECTION INTRAMUSCULAR; INTRAVENOUS at 17:06

## 2018-05-26 RX ADMIN — MIDAZOLAM HYDROCHLORIDE 2 MG: 1 INJECTION, SOLUTION INTRAMUSCULAR; INTRAVENOUS at 11:25

## 2018-05-26 RX ADMIN — MONTELUKAST SODIUM 10 MG: 10 TABLET, FILM COATED ORAL at 21:22

## 2018-05-26 RX ADMIN — METHYLDOPA 500 MG: 250 TABLET ORAL at 06:14

## 2018-05-26 NOTE — ANESTHESIA PROCEDURE NOTES
Spinal Block    Patient location during procedure: OB  Performed By  Anesthesiologist: ANA SPAIN  Preanesthetic Checklist  Completed: patient identified, site marked, surgical consent, pre-op evaluation, timeout performed, IV checked, risks and benefits discussed and monitors and equipment checked  Spinal Block Prep:  Patient Position:sitting  Sterile Tech:cap, gloves, mask and sterile barriers  Prep:DuraPrep  Patient Monitoring:blood pressure monitoring, continuous pulse oximetry and EKG  Spinal Block Procedure  Approach:midline  Guidance:landmark technique and palpation technique  Location:L4-L5  Needle Type:Sprotte  Needle Gauge:25 G  Placement of Spinal needle event:cerebrospinal fluid aspirated  Paresthesia: no  Fluid Appearance:clear  Post Assessment  Patient Tolerance:patient tolerated the procedure well with no apparent complications  Complications no

## 2018-05-26 NOTE — PLAN OF CARE
Problem: Hypertensive Disorders in Pregnancy (Adult,Obstetrics,Pediatric)  Goal: Signs and Symptoms of Listed Potential Problems Will be Absent, Minimized or Managed (Hypertensive Disorders in Pregnancy)  Outcome: Ongoing (interventions implemented as appropriate)   05/26/18 0807   Goal/Outcome Evaluation   Problems Assessed (Hypertensive Disorders in Pregnancy) all   Problems Present (Hypertensive/in PG) none

## 2018-05-26 NOTE — PLAN OF CARE
Problem: Diabetes in Pregnancy (Adult,Obstetrics,Pediatric)  Goal: Signs and Symptoms of Listed Potential Problems Will be Absent, Minimized or Managed (Diabetes in Pregnancy)  Outcome: Ongoing (interventions implemented as appropriate)   05/26/18 0807   Goal/Outcome Evaluation   Problems Assessed (Diabetes in Pregnancy) all   Problems Present (Diabetes with Pregnancy) none

## 2018-05-26 NOTE — ANESTHESIA PROCEDURE NOTES
Labor Epidural    Patient location during procedure: OB  Performed By  Anesthesiologist: ANA SPAIN  Preanesthetic Checklist  Completed: patient identified, site marked, surgical consent, pre-op evaluation, timeout performed, IV checked, risks and benefits discussed and monitors and equipment checked  Prep:  Pt Position:sitting  Sterile Tech:gloves, mask, sterile barrier and cap  Prep:DuraPrep  Monitoring:blood pressure monitoring  Epidural Block Procedure:  Approach:midline  Guidance:landmark technique and palpation technique  Location:L3-L4  Needle Type:Tuohy  Needle Gauge:17 G  Loss of Resistance Medium: air  Loss of Resistance: 6cm  Cath Depth at skin:11 cm  Paresthesia: none  Aspiration:negative  Test Dose:negative  Number of Attempts: 1  Post Assessment:  Dressing:secured with tape and occlusive dressing applied (Tegaderm Placed)  Pt Tolerance:patient tolerated the procedure well with no apparent complications  Complications:no

## 2018-05-26 NOTE — LACTATION NOTE
05/26/18 1700   Maternal Information   Date of Referral 05/26/18   Person Making Referral other (see comments)  (courtesy)   Maternal Reason for Referral breastfeeding currently   Infant Reason for Referral other (see comments)  (infant in NICU obs)   Maternal Assessment   Breast Size Issue none   Breast Shape Bilateral:;round   Breast Density Bilateral:;soft   Nipples everted;graspable   Maternal Infant Feeding   Maternal Emotional State independent;relaxed   Equipment Type   Breast Pump Type double electric, hospital grade   Breast Pump Flange Type hard   Breast Pump Flange Size 24 mm   Reproductive Interventions   Breastfeeding Assistance support offered   Breastfeeding Support lactation counseling provided   Breast Pumping   Breast Pumping Interventions frequent pumping encouraged;post-feed pumping encouraged   Breast Pumping double electric breast pump utilized

## 2018-05-26 NOTE — PLAN OF CARE
"Problem: Patient Care Overview  Goal: Plan of Care Review  Outcome: Ongoing (interventions implemented as appropriate)   05/26/18 1700   Coping/Psychosocial   Plan of Care Reviewed With patient   OTHER   Outcome Summary Infant in NICU obslu to breastfeed, instructed on hospital grade pump and progamming. Encouraged her to pump bee 3 hours that infant was in NICU and if infant able to come to room to offer breast frequently and pump after feedings if infant sleepy.  first child for \"a long time\" without difficulty. Has home pump for use after discharge.       Problem: Breastfeeding (Adult,Obstetrics,Pediatric)  Intervention: Promote Breast Care and Comfort   05/26/18 1700   Breast Pumping   Breast Pumping double electric breast pump utilized     Intervention: Promote Positive Maternal Experience   05/26/18 1700   Promote Positive Maternal Experience   Breastfeeding Support lactation counseling provided     Intervention: Support Exclusive Breastfeeding Success   05/26/18 1700   Reproductive Interventions   Breastfeeding Assistance support offered       Goal: Signs and Symptoms of Listed Potential Problems Will be Absent, Minimized or Managed (Breastfeeding)  Outcome: Ongoing (interventions implemented as appropriate)   05/26/18 1700   Goal/Outcome Evaluation   Problems Assessed (Breastfeeding) all   Problems Present (Breastfeeding) none         "

## 2018-05-26 NOTE — PLAN OF CARE
Problem: Patient Care Overview  Goal: Plan of Care Review  Outcome: Ongoing (interventions implemented as appropriate)   05/26/18 0807   Coping/Psychosocial   Plan of Care Reviewed With patient   Plan of Care Review   Progress improving

## 2018-05-26 NOTE — ANESTHESIA POSTPROCEDURE EVALUATION
Patient: Janell Terrell    Procedure Summary     Date:  18 Room / Location:  [facility] LABOR DELIVERY   [facility]OR DELIVERY    Anesthesia Start:   Anesthesia Stop:      Procedure:   SECTION REPEAT WITH TUBAL * 37 WKS - GEST. DIABETIC, ELEVATED BPS* (Bilateral Abdomen) Diagnosis:      Surgeon:  Terrence Dutta MD Provider:  Hunter Vines DO    Anesthesia Type:  spinal, ITN ASA Status:  3          Anesthesia Type: spinal, ITN  Last vitals  BP   (P) 119/64 (18 1300)   Temp   (P) 97.9 °F (36.6 °C) (18 1300)   Pulse   (P) 66 (18 1300)   Resp   (P) 18 (18 1300)     SpO2   (P) 96 % (18 1300)     Post Anesthesia Care and Evaluation    Patient location during evaluation: bedside  Patient participation: complete - patient participated  Level of consciousness: awake  Pain score: 0  Pain management: satisfactory to patient  Airway patency: patent  Anesthetic complications: No anesthetic complications  PONV Status: none  Cardiovascular status: acceptable and hemodynamically stable  Respiratory status: acceptable  Hydration status: acceptable

## 2018-05-26 NOTE — PLAN OF CARE
Problem: Patient Care Overview  Goal: Plan of Care Review  Outcome: Ongoing (interventions implemented as appropriate)   05/26/18 0645   Coping/Psychosocial   Plan of Care Reviewed With patient   Plan of Care Review   Progress improving       Problem: Diabetes in Pregnancy (Adult,Obstetrics,Pediatric)  Goal: Signs and Symptoms of Listed Potential Problems Will be Absent, Minimized or Managed (Diabetes in Pregnancy)  Outcome: Ongoing (interventions implemented as appropriate)   05/26/18 0645   Goal/Outcome Evaluation   Problems Assessed (Diabetes in Pregnancy) all   Problems Present (Diabetes with Pregnancy) none       Problem: Hypertensive Disorders in Pregnancy (Adult,Obstetrics,Pediatric)  Goal: Signs and Symptoms of Listed Potential Problems Will be Absent, Minimized or Managed (Hypertensive Disorders in Pregnancy)  Outcome: Ongoing (interventions implemented as appropriate)   05/26/18 0645   Goal/Outcome Evaluation   Problems Assessed (Hypertensive Disorders in Pregnancy) all   Problems Present (Hypertensive/in PG) none

## 2018-05-26 NOTE — NURSING NOTE
1238 Pharmacy called to verify pain med orders; waiting 15 min; says will get pharmacist to verify.

## 2018-05-26 NOTE — L&D DELIVERY NOTE
Operative Note    Patient name: Janell Terrell  YOB: 1980   MRN: [id number]  Admission Date: 2018  Referring Provider: Heike Bailey MD    ID: 37 y.o.  at 37w0d    Preoperative Diagnosis:   Patient Active Problem List   Diagnosis   • Family history of clubfoot   • Elderly multigravida in third trimester   • Previous  section   • H/O Asthma   • GERD   • Morbidly obese   • Maternal chronic hypertension in third trimester   • Insulin controlled gestational diabetes mellitus (GDM) in third trimester   • Pregnant   desires tubal sterilization    Postoperative Diagnosis: Same as above.    Procedure(s): repeatlow transverse  delivery                             Bilateral segmental salpingectomy  Surgeons: Surgeon(s) and Role:     * Terrence Dutta MD - Primary     * Arpit Blandon III, MD - Assisting    Anesthesia: Combined spinal epidural    Estimated Blood Loss: 1000 mL mL    IV Fluids: [unfilled]    Preoperative antibiotic: Ancef (cefazolin) 3 grams    Blood products:   Blood Administration Record     None          Pathology:   Order Name Source Comment Collection Info Order Time   TISSUE PATHOLOGY EXAM Fallopian Tubes, Bilateral  Collected By: Mindy Barnes RN 2018 12:31 PM    Specimen source(s):  Fallopian Tubes, Bilateral          Drains: Barcenas catheter to gravity    Complications: None    Condition: Stable to recovery room                                          Infant:                 Gender: male  infant    Weight: No birth weight on file.     Apgars: 6   @ 1 minute /     9   @ 5 minutes    Cord gases: Venous:  @BABYNOHDR(BRIEFLAB, PHCVEN, BECVEN)@     Arterial:  @BABYNOHDR(BRIEFLAB, PHCART, BECART)@         Operative Summary:   After obtaining informed consent the patient was taken to the operating room where adequate anesthesia was obtained.  Barcenas catheter was placed in the bladder preoperatively.  IV antibiotics were given preoperatively.       The  abdomen was prepped and draped in the usual sterile fashion for  delivery.  After confirming adequate anesthesia a Pfannenstiel skin incision was made with the scalpel and carried through to the underlying layer of fascia.  The fascia was incised in the midline and the incision extended laterally with the Flores scissors and with blunt dissection.       The upper aspect of the fascia was grasped with 2 Kocher clamps, elevated, and dissected off the underlying rectus muscles bluntly and with the Flores scissors.  The Kocher clamps were removed and applied to the inferior aspect of the fascia.  The fascia was dissected off of the rectus muscles in the same fashion.  The peritoneum was entered bluntly.  The incision was stretched and the bladder blade and Borrero retractor inserted for visualization of the uterus.      The uterus was incised with the scalpel in a low transverse fashion.  The uterine incision was entered digitally and the incision extended bluntly in a cranial-caudal fashion.  Retractors were removed and membranes were ruptured.  The infant was delivered atraumatically from cephlic presentation.  The umbilical cord was clamped and cut and the nose and mouth bulb suctioned.  The infant was handed off to waiting pediatric staff.      Cord blood gases were not collected.  Cord blood was collected.  The placenta was removed using cord traction and uterine massage.  The uterus was exteriorized and cleared of all clots and debris.  The uterine incision was repaired with 1-0 Chromic in a running locked fashion. A double-layer technique was used.  Additional hemostatic measures required: figure-of-eight sutures.  Attention was turned to the tubal sterilization. We identified both tubes, doubled the tube on the proximal one-third. The tubes were tied with 0 plain suture x2. The knuckle above each tie was cut and sent to the lab for confirming diagnosis. We did identify the fimbriated end to make sure we  had properly worked on the proper structure. The tubal went off without any complications.       The incision was inspected and excellent hemostasis was noted.  The tubes and ovaries were noted to be normal. The uterus was returned to the abdomen.  The gutters were cleared of all clots and debris.  Irrigation was used.  The uterine incision was again inspected and found to be hemostatic.      The peritoneum was reapproximated with 2.0 Vicryl .  The fascia was closed with 0 Vicryl  in a running fashion (two segments).  The subcutaneous space was reapproximated using 3.0 Plain.      The skin was closed using staples.  The patient was transferred to the recovery room in stable condition.    Terrence Dutta MD  5/26/2018  1:21 PM

## 2018-05-26 NOTE — ANESTHESIA PREPROCEDURE EVALUATION
Anesthesia Evaluation     Patient summary reviewed and Nursing notes reviewed   NPO Solid Status: > 6 hours  NPO Liquid Status: > 2 hours           Airway   Mallampati: II  TM distance: >3 FB  Neck ROM: full  No difficulty expected  Dental      Pulmonary    (+) asthma,   Cardiovascular     (+) hypertension,       Neuro/Psych  (+) headaches, psychiatric history Anxiety and Depression,     GI/Hepatic/Renal/Endo    (+) obesity,  GERD well controlled,  diabetes mellitus gestational using insulin,   (-) morbid obesity    Musculoskeletal (-) negative ROS    Abdominal    Substance History - negative use     OB/GYN    (+) Pregnant,   (-) history of pregnancy induced hypertension        Other                        Anesthesia Plan    ASA 3     spinal and ITN     Anesthetic plan and risks discussed with patient.

## 2018-05-27 LAB
BASOPHILS # BLD AUTO: 0.03 10*3/MM3 (ref 0–0.2)
BASOPHILS NFR BLD AUTO: 0.2 % (ref 0–1)
DEPRECATED RDW RBC AUTO: 47.9 FL (ref 37–54)
EOSINOPHIL # BLD AUTO: 0.27 10*3/MM3 (ref 0–0.3)
EOSINOPHIL NFR BLD AUTO: 2.1 % (ref 0–3)
ERYTHROCYTE [DISTWIDTH] IN BLOOD BY AUTOMATED COUNT: 14.7 % (ref 11.3–14.5)
GLUCOSE BLDC GLUCOMTR-MCNC: 116 MG/DL (ref 70–130)
GLUCOSE BLDC GLUCOMTR-MCNC: 122 MG/DL (ref 70–130)
GLUCOSE BLDC GLUCOMTR-MCNC: 160 MG/DL (ref 70–130)
GLUCOSE BLDC GLUCOMTR-MCNC: 81 MG/DL (ref 70–130)
GLUCOSE BLDC GLUCOMTR-MCNC: 89 MG/DL (ref 70–130)
HCT VFR BLD AUTO: 31.9 % (ref 34.5–44)
HGB BLD-MCNC: 10.4 G/DL (ref 11.5–15.5)
IMM GRANULOCYTES # BLD: 0.04 10*3/MM3 (ref 0–0.03)
IMM GRANULOCYTES NFR BLD: 0.3 % (ref 0–0.6)
LYMPHOCYTES # BLD AUTO: 1.63 10*3/MM3 (ref 0.6–4.8)
LYMPHOCYTES NFR BLD AUTO: 12.6 % (ref 24–44)
MCH RBC QN AUTO: 29.1 PG (ref 27–31)
MCHC RBC AUTO-ENTMCNC: 32.6 G/DL (ref 32–36)
MCV RBC AUTO: 89.1 FL (ref 80–99)
MONOCYTES # BLD AUTO: 0.83 10*3/MM3 (ref 0–1)
MONOCYTES NFR BLD AUTO: 6.4 % (ref 0–12)
NEUTROPHILS # BLD AUTO: 10.17 10*3/MM3 (ref 1.5–8.3)
NEUTROPHILS NFR BLD AUTO: 78.7 % (ref 41–71)
PLATELET # BLD AUTO: 295 10*3/MM3 (ref 150–450)
PMV BLD AUTO: 10.5 FL (ref 6–12)
RBC # BLD AUTO: 3.58 10*6/MM3 (ref 3.89–5.14)
WBC NRBC COR # BLD: 12.93 10*3/MM3 (ref 3.5–10.8)

## 2018-05-27 PROCEDURE — 82962 GLUCOSE BLOOD TEST: CPT

## 2018-05-27 PROCEDURE — 94640 AIRWAY INHALATION TREATMENT: CPT

## 2018-05-27 PROCEDURE — 85025 COMPLETE CBC W/AUTO DIFF WBC: CPT | Performed by: OBSTETRICS & GYNECOLOGY

## 2018-05-27 PROCEDURE — 25010000002 HYDROMORPHONE PER 4 MG: Performed by: OBSTETRICS & GYNECOLOGY

## 2018-05-27 PROCEDURE — 94799 UNLISTED PULMONARY SVC/PX: CPT

## 2018-05-27 PROCEDURE — 63710000001 INSULIN LISPRO (HUMAN) PER 5 UNITS: Performed by: OBSTETRICS & GYNECOLOGY

## 2018-05-27 RX ORDER — LANOLIN 100 %
OINTMENT (GRAM) TOPICAL AS NEEDED
Status: DISCONTINUED | OUTPATIENT
Start: 2018-05-27 | End: 2018-05-28 | Stop reason: HOSPADM

## 2018-05-27 RX ORDER — IPRATROPIUM BROMIDE AND ALBUTEROL SULFATE 2.5; .5 MG/3ML; MG/3ML
3 SOLUTION RESPIRATORY (INHALATION) EVERY 4 HOURS PRN
Status: DISCONTINUED | OUTPATIENT
Start: 2018-05-27 | End: 2018-05-28 | Stop reason: HOSPADM

## 2018-05-27 RX ADMIN — IBUPROFEN 600 MG: 600 TABLET ORAL at 23:15

## 2018-05-27 RX ADMIN — MONTELUKAST SODIUM 10 MG: 10 TABLET, FILM COATED ORAL at 23:13

## 2018-05-27 RX ADMIN — DOCUSATE SODIUM 100 MG: 100 CAPSULE, LIQUID FILLED ORAL at 21:46

## 2018-05-27 RX ADMIN — OXYCODONE HYDROCHLORIDE AND ACETAMINOPHEN 1 TABLET: 10; 325 TABLET ORAL at 05:11

## 2018-05-27 RX ADMIN — OXYCODONE HYDROCHLORIDE AND ACETAMINOPHEN 1 TABLET: 10; 325 TABLET ORAL at 09:23

## 2018-05-27 RX ADMIN — METHYLDOPA 500 MG: 250 TABLET ORAL at 21:46

## 2018-05-27 RX ADMIN — OXYCODONE HYDROCHLORIDE AND ACETAMINOPHEN 1 TABLET: 10; 325 TABLET ORAL at 13:43

## 2018-05-27 RX ADMIN — IBUPROFEN 600 MG: 600 TABLET ORAL at 17:09

## 2018-05-27 RX ADMIN — HYDROMORPHONE HYDROCHLORIDE 1 MG: 1 INJECTION, SOLUTION INTRAMUSCULAR; INTRAVENOUS; SUBCUTANEOUS at 12:19

## 2018-05-27 RX ADMIN — OXYCODONE HYDROCHLORIDE AND ACETAMINOPHEN 1 TABLET: 10; 325 TABLET ORAL at 17:46

## 2018-05-27 RX ADMIN — IPRATROPIUM BROMIDE AND ALBUTEROL SULFATE 3 ML: 2.5; .5 SOLUTION RESPIRATORY (INHALATION) at 09:12

## 2018-05-27 RX ADMIN — OXYCODONE HYDROCHLORIDE AND ACETAMINOPHEN 1 TABLET: 10; 325 TABLET ORAL at 00:06

## 2018-05-27 RX ADMIN — FLUNISOLIDE 1 SPRAY: 0.25 SOLUTION NASAL at 08:08

## 2018-05-27 RX ADMIN — SODIUM CHLORIDE, POTASSIUM CHLORIDE, SODIUM LACTATE AND CALCIUM CHLORIDE 125 ML/HR: 600; 310; 30; 20 INJECTION, SOLUTION INTRAVENOUS at 02:51

## 2018-05-27 RX ADMIN — IBUPROFEN 600 MG: 600 TABLET ORAL at 05:11

## 2018-05-27 RX ADMIN — METHYLDOPA 500 MG: 250 TABLET ORAL at 06:35

## 2018-05-27 RX ADMIN — Medication 1 APPLICATION: at 12:20

## 2018-05-27 RX ADMIN — CETIRIZINE HYDROCHLORIDE 10 MG: 10 TABLET, FILM COATED ORAL at 08:08

## 2018-05-27 RX ADMIN — METHYLDOPA 500 MG: 250 TABLET ORAL at 13:43

## 2018-05-27 RX ADMIN — IPRATROPIUM BROMIDE AND ALBUTEROL SULFATE 3 ML: 2.5; .5 SOLUTION RESPIRATORY (INHALATION) at 20:02

## 2018-05-27 RX ADMIN — OXYCODONE HYDROCHLORIDE AND ACETAMINOPHEN 1 TABLET: 10; 325 TABLET ORAL at 21:46

## 2018-05-27 RX ADMIN — IPRATROPIUM BROMIDE AND ALBUTEROL SULFATE 3 ML: 2.5; .5 SOLUTION RESPIRATORY (INHALATION) at 13:05

## 2018-05-27 RX ADMIN — DOCUSATE SODIUM 100 MG: 100 CAPSULE, LIQUID FILLED ORAL at 08:08

## 2018-05-27 NOTE — PROGRESS NOTES
Postpartum Progress Note    Patient name: Janell Terrell  YOB: 1980   MRN: [id number]  Referring Provider: Heike Bailey   Admission Date: 2018  Date of Service: 2018    ID: 37 y.o.     Diagnosis:   S/p  delivery 1 Day Post-Op   Patient Active Problem List   Diagnosis   • Family history of clubfoot   • Elderly multigravida in third trimester   • Previous  section   • H/O Asthma   • GERD   • Morbidly obese   • Maternal chronic hypertension in third trimester   • Insulin controlled gestational diabetes mellitus (GDM) in third trimester   • Pregnant       Subjective:      No complaints.  Moderate lochia.  Ambulating, voiding, tolerating diet.  Pain well controlled.  The patient is currently breastfeeding.   This baby is a [unfilled].    Objective:      Vital signs:  Vital Signs Range for the last 24 hours  Temperature: Temp:  [96.9 °F (36.1 °C)-98 °F (36.7 °C)] 98 °F (36.7 °C)   Temp Source: Temp src: Oral   BP: BP: (102-132)/(57-78) 120/64   Pulse: Heart Rate:  [62-75] 67   Respirations: Resp:  [16-20] 16   Weight: 106 kg (233 lb)     General: Alert & oriented x4, in no apparent distress  Abdomen: soft, nontender  Uterus: firm, nontender  Incision: clean, dry, intact, dressing clean, staple  Extremities: nontender; no edema      Labs:  Lab Results   Component Value Date    WBC 12.93 (H) 2018    HGB 10.4 (L) 2018    HCT 31.9 (L) 2018    MCV 89.1 2018     2018       Results from last 7 days  Lab Units 18   ABO TYPING  O   RH TYPING  Positive     External Prenatal Results     Pregnancy Outside Results - these were transcribed from office records.  See scanned records for details.     Test Value Date Time    Hgb 10.4 g/dL (L) 18 0859    Hct 31.9 % (L) 18 0859    ABO O  18    Rh Positive  18    Antibody Screen Negative  18    Glucose Fasting GTT       Glucose Tolerance Test 1  hour       Glucose Tolerance Test 3 hour       Gonorrhea (discrete)       Chlamydia (discrete)       RPR Non-Reactive  10/27/17     VDRL       Syphillis Antibody       Rubella Non-Immune  10/27/17     HBsAg Negative  10/27/17     Herpes Simplex Virus PCR       Herpes Simplex VIrus Culture       HIV Non-Reactive  10/27/17     Hep C RNA Quant PCR       Hep C Antibody       AFP       Group B Strep       GBS Susceptibility to Clindamycin       GBS Susceptibility to Eythromycin       Fetal Fibronectin       Genetic Testing, Maternal Blood             Drug Screening     Test Value Date Time    Urine Drug Screen       Amphetamine Screen       Barbiturate Screen       Benzodiazepine Screen       Methadone Screen       Phencyclidine Screen       Opiates Screen       THC Screen       Cocaine Screen       Propoxyphene Screen       Buprenorphine Screen       Methamphetamine Screen       Oxycodone Screen       Tryicyclic Antidepressants Screen                   Assessment/Plan:      1 Day Post-Op s/p Procedure(s):   SECTION REPEAT WITH TUBAL * 37 WKS - GEST. DIABETIC, ELEVATED BPS*  1. S/p  delivery: Continue postoperative care.  Doing well.  2. Infant feeding: Supportive care.  The patient is currently breastfeeding.  .Currently BS are doing well off insulin     Terrence Dutta  2018

## 2018-05-27 NOTE — PLAN OF CARE
Problem: Patient Care Overview  Goal: Plan of Care Review  Outcome: Ongoing (interventions implemented as appropriate)   18   Coping/Psychosocial   Plan of Care Reviewed With patient   Plan of Care Review   Progress improving       Problem: Diabetes in Pregnancy (Adult,Obstetrics,Pediatric)  Goal: Signs and Symptoms of Listed Potential Problems Will be Absent, Minimized or Managed (Diabetes in Pregnancy)  Outcome: Ongoing (interventions implemented as appropriate)   18   Goal/Outcome Evaluation   Problems Assessed (Diabetes in Pregnancy) all   Problems Present (Diabetes with Pregnancy) none       Problem: Hypertensive Disorders in Pregnancy (Adult,Obstetrics,Pediatric)  Goal: Signs and Symptoms of Listed Potential Problems Will be Absent, Minimized or Managed (Hypertensive Disorders in Pregnancy)  Outcome: Ongoing (interventions implemented as appropriate)   18   Goal/Outcome Evaluation   Problems Assessed (Hypertensive Disorders in Pregnancy) all   Problems Present (Hypertensive/in PG) none       Problem: Postpartum ( Delivery) (Adult,Obstetrics,Pediatric)  Goal: Signs and Symptoms of Listed Potential Problems Will be Absent, Minimized or Managed (Postpartum)  Outcome: Ongoing (interventions implemented as appropriate)   18   Goal/Outcome Evaluation   Problems Assessed (Postpartum ) all   Problems Present (Postpartum ) none

## 2018-05-28 ENCOUNTER — APPOINTMENT (OUTPATIENT)
Dept: PREADMISSION TESTING | Facility: HOSPITAL | Age: 38
End: 2018-05-28

## 2018-05-28 VITALS
HEART RATE: 83 BPM | TEMPERATURE: 98.5 F | RESPIRATION RATE: 16 BRPM | OXYGEN SATURATION: 99 % | HEIGHT: 65 IN | WEIGHT: 233 LBS | SYSTOLIC BLOOD PRESSURE: 132 MMHG | DIASTOLIC BLOOD PRESSURE: 64 MMHG | BODY MASS INDEX: 38.82 KG/M2

## 2018-05-28 LAB — GLUCOSE BLDC GLUCOMTR-MCNC: 104 MG/DL (ref 70–130)

## 2018-05-28 PROCEDURE — 82962 GLUCOSE BLOOD TEST: CPT

## 2018-05-28 PROCEDURE — 94640 AIRWAY INHALATION TREATMENT: CPT

## 2018-05-28 PROCEDURE — 94799 UNLISTED PULMONARY SVC/PX: CPT

## 2018-05-28 RX ORDER — LOSARTAN POTASSIUM 50 MG/1
50 TABLET ORAL
Qty: 60 TABLET | Refills: 0 | Status: SHIPPED | OUTPATIENT
Start: 2018-05-28

## 2018-05-28 RX ORDER — ALBUTEROL SULFATE 1.25 MG/3ML
1 SOLUTION RESPIRATORY (INHALATION) EVERY 6 HOURS PRN
Status: DISCONTINUED | OUTPATIENT
Start: 2018-05-28 | End: 2018-05-28 | Stop reason: SDUPTHER

## 2018-05-28 RX ORDER — ALBUTEROL SULFATE 2.5 MG/3ML
1.25 SOLUTION RESPIRATORY (INHALATION) EVERY 6 HOURS PRN
Status: DISCONTINUED | OUTPATIENT
Start: 2018-05-28 | End: 2018-05-28 | Stop reason: HOSPADM

## 2018-05-28 RX ORDER — LOSARTAN POTASSIUM 50 MG/1
50 TABLET ORAL
Status: DISCONTINUED | OUTPATIENT
Start: 2018-05-28 | End: 2018-05-28 | Stop reason: HOSPADM

## 2018-05-28 RX ORDER — CALCIUM CARBONATE 200(500)MG
2 TABLET,CHEWABLE ORAL
Status: DISCONTINUED | OUTPATIENT
Start: 2018-05-28 | End: 2018-05-28 | Stop reason: HOSPADM

## 2018-05-28 RX ORDER — OXYCODONE AND ACETAMINOPHEN 10; 325 MG/1; MG/1
1 TABLET ORAL EVERY 4 HOURS PRN
Qty: 30 TABLET | Refills: 0 | Status: SHIPPED | OUTPATIENT
Start: 2018-05-28 | End: 2018-06-05

## 2018-05-28 RX ORDER — DIPHENHYDRAMINE HCL 25 MG
25 CAPSULE ORAL NIGHTLY PRN
Status: DISCONTINUED | OUTPATIENT
Start: 2018-05-28 | End: 2018-05-28 | Stop reason: HOSPADM

## 2018-05-28 RX ORDER — IBUPROFEN 600 MG/1
600 TABLET ORAL ONCE AS NEEDED
Qty: 1 TABLET | Refills: 1 | Status: SHIPPED | OUTPATIENT
Start: 2018-05-28 | End: 2018-07-09

## 2018-05-28 RX ORDER — ASPIRIN 81 MG/1
81 TABLET ORAL DAILY
Status: DISCONTINUED | OUTPATIENT
Start: 2018-05-28 | End: 2018-05-28 | Stop reason: HOSPADM

## 2018-05-28 RX ORDER — DOCUSATE SODIUM 100 MG/1
100 CAPSULE, LIQUID FILLED ORAL DAILY
Status: DISCONTINUED | OUTPATIENT
Start: 2018-05-29 | End: 2018-05-28 | Stop reason: HOSPADM

## 2018-05-28 RX ORDER — CETIRIZINE HYDROCHLORIDE 10 MG/1
10 TABLET ORAL DAILY
Status: DISCONTINUED | OUTPATIENT
Start: 2018-05-29 | End: 2018-05-28 | Stop reason: HOSPADM

## 2018-05-28 RX ORDER — OMEPRAZOLE 40 MG/1
40 CAPSULE, DELAYED RELEASE ORAL DAILY
Qty: 30 CAPSULE | Refills: 0 | Status: SHIPPED | OUTPATIENT
Start: 2018-05-28

## 2018-05-28 RX ADMIN — DOCUSATE SODIUM 100 MG: 100 CAPSULE, LIQUID FILLED ORAL at 08:51

## 2018-05-28 RX ADMIN — IBUPROFEN 600 MG: 600 TABLET ORAL at 12:17

## 2018-05-28 RX ADMIN — ASPIRIN 81 MG: 81 TABLET, COATED ORAL at 11:27

## 2018-05-28 RX ADMIN — OXYCODONE HYDROCHLORIDE AND ACETAMINOPHEN 1 TABLET: 10; 325 TABLET ORAL at 15:56

## 2018-05-28 RX ADMIN — OXYCODONE HYDROCHLORIDE AND ACETAMINOPHEN 1 TABLET: 10; 325 TABLET ORAL at 00:47

## 2018-05-28 RX ADMIN — FLUNISOLIDE 1 SPRAY: 0.25 SOLUTION NASAL at 08:51

## 2018-05-28 RX ADMIN — IPRATROPIUM BROMIDE AND ALBUTEROL SULFATE 3 ML: 2.5; .5 SOLUTION RESPIRATORY (INHALATION) at 07:47

## 2018-05-28 RX ADMIN — CETIRIZINE HYDROCHLORIDE 10 MG: 10 TABLET, FILM COATED ORAL at 08:51

## 2018-05-28 RX ADMIN — IPRATROPIUM BROMIDE AND ALBUTEROL SULFATE 3 ML: 2.5; .5 SOLUTION RESPIRATORY (INHALATION) at 11:34

## 2018-05-28 RX ADMIN — OXYCODONE HYDROCHLORIDE AND ACETAMINOPHEN 1 TABLET: 10; 325 TABLET ORAL at 08:51

## 2018-05-28 RX ADMIN — LOSARTAN POTASSIUM 50 MG: 50 TABLET ORAL at 11:27

## 2018-05-28 RX ADMIN — OXYCODONE HYDROCHLORIDE AND ACETAMINOPHEN 1 TABLET: 10; 325 TABLET ORAL at 04:48

## 2018-05-28 RX ADMIN — IBUPROFEN 600 MG: 600 TABLET ORAL at 06:17

## 2018-05-28 RX ADMIN — OXYCODONE HYDROCHLORIDE AND ACETAMINOPHEN 1 TABLET: 10; 325 TABLET ORAL at 12:21

## 2018-05-28 NOTE — PROGRESS NOTES
2018    Name:Janell Terrell    MR#:[id number]     PROGRESS NOTE:  Post-Op 2 S/P        Subjective   37 y.o. yo Female  s/p CS at 37w0d doing well. Pain well controlled. Tolerating regular diet and having flatus. Lochia normal.     Patient Active Problem List   Diagnosis   • Family history of clubfoot   • Elderly multigravida in third trimester   • Previous  section   • H/O Asthma   • GERD   • Morbidly obese   • Maternal chronic hypertension in third trimester   • Insulin controlled gestational diabetes mellitus (GDM) in third trimester   • Pregnant        Objective    Vitals  Temp:  Temp:  [97.9 °F (36.6 °C)-98.3 °F (36.8 °C)] 97.9 °F (36.6 °C)  Temp src: Oral  BP:  BP: (115-149)/(60-71) 131/60  Pulse:  Heart Rate:  [68-95] 76  RR:   Resp:  [14-20] 16    General Awake, alert, no distress  Abdomen Soft, non-distended, fundus firm, below umbilicus, appropriately tender  Incision  Intact, no erythema or exudate  Extremities Calves NT bilaterally     I/O last 3 completed shifts:  In: -   Out: 2400 [Urine:2400]    LABS:   Lab Results   Component Value Date    WBC 12.93 (H) 2018    HGB 10.4 (L) 2018    HCT 31.9 (L) 2018    MCV 89.1 2018     2018       Infant: male   Opted for no circumcision      Assessment   1.  POD 2 from c/s    Plan: Doing well.  Encouraged breastfeeding.  Changed back to pre-pregnancy Losartan.  FSBS's have been mostly normal off of insulin.  Discharge to home. Scripts for Percocet and Motrin provided and advised on weaning.   Follow up in 1 weeks for incision check, BP/BS check.  No driving for 2 weeks.   No Tampons, swimming, intercourse or tub baths for 6 weeks.     Mary Delacruz MD  2018 9:43 AM

## 2018-05-28 NOTE — DISCHARGE SUMMARY
Discharge Summary    Date of Admission: 2018  Date of Discharge:  2018      Patient: Janell Terrell      MR#:[id number]    Primary Surgeon/OB: Terrence Dutta MD    Discharge Surgeon/OB: Jayme/Leo pt    Presenting Problem/History of Present Illness  Pregnant [Z34.90]     Patient Active Problem List   Diagnosis   • Family history of clubfoot   • Elderly multigravida in third trimester   • Previous  section   • H/O Asthma   • GERD   • Morbidly obese   • Maternal chronic hypertension in third trimester   • Insulin controlled gestational diabetes mellitus (GDM) in third trimester   • Pregnant         Discharge Diagnosis:  section at 37w0d    Procedures:  , Low Transverse     2018    12:15 PM            Discharge Date: 2018; Discharge Time: 9:50 AM        Hospital Course  Patient is a 37 y.o. female  at 37w0d status post  section with uneventful postoperative recovery.  Patient was advanced to regular diet on postoperative day#1.  On discharge, ambulating, tolerating a regular diet without any difficulties and her incision is dry, clean and intact.     Infant:   male  fetus 2906 g (6 lb 6.5 oz)  with Apgar scores of 6  , 9   at five minutes.    Condition on Discharge:  Stable    Vital Signs  Temp:  [97.9 °F (36.6 °C)-98.3 °F (36.8 °C)] 97.9 °F (36.6 °C)  Heart Rate:  [68-95] 76  Resp:  [14-20] 16  BP: (115-149)/(60-71) 131/60    Lab Results   Component Value Date    WBC 12.93 (H) 2018    HGB 10.4 (L) 2018    HCT 31.9 (L) 2018    MCV 89.1 2018     2018       Discharge Disposition  Home or Self Care    Discharge Medications   Janell Terrell   Home Medication Instructions BERNARDA:[id number]    Printed on:18 8161   Medication Information                      albuterol (ACCUNEB) 1.25 MG/3ML nebulizer solution  Take 3 mL by nebulization Every 6 (Six) Hours As Needed for Wheezing or Shortness of Air.            "  aspirin 81 MG EC tablet  Take 81 mg by mouth Daily.             calcium carbonate (TUMS) 500 MG chewable tablet  Chew 2 tablets 5 (Five) Times a Day As Needed for Indigestion or Heartburn.             cetirizine (zyrTEC) 10 MG tablet  Take 10 mg by mouth Daily.             diphenhydrAMINE (BENADRYL) 25 mg capsule  Take 25 mg by mouth At Night As Needed for Itching.             docusate sodium (COLACE) 100 MG capsule  Take 100 mg by mouth Daily.             ibuprofen (ADVIL,MOTRIN) 600 MG tablet  Take 1 tablet by mouth 1 (One) Time As Needed for Mild Pain  for up to 1 dose.             Insulin Pen Needle (B-D UF III MINI PEN NEEDLES) 31G X 5 MM misc  1 Units 3 (Three) Times a Day Before Meals.             Insulin Syringe-Needle U-100 30G X 5/16\" 0.3 ML misc  1 Units Every Night.             losartan (COZAAR) 50 MG tablet  Take 1 tablet by mouth Daily.             omeprazole (priLOSEC) 40 MG capsule  Take 1 capsule by mouth Daily.             ONETOUCH DELICA LANCETS 33G misc  Test blood sugars QID             oxyCODONE-acetaminophen (PERCOCET)  MG per tablet  Take 1 tablet by mouth Every 4 (Four) Hours As Needed for Severe Pain  (may take half if mod pain) for up to 8 days.                     Follow-up Appointments  Plan: Doing well.  Encouraged breastfeeding.  Changed back to pre-pregnancy Losartan.  FSBS's have been mostly normal off of insulin.  Discharge to home. Scripts for Percocet and Motrin provided and advised on weaning.   Follow up in 1 weeks for incision check, BP/BS check.  No driving for 2 weeks.   No Tampons, swimming, intercourse or tub baths for 6 weeks.       Mary Delacruz MD  05/28/18  9:50 AM  Csd      "

## 2018-05-28 NOTE — LACTATION NOTE
Mom reports nipple are tender.  Look intact.  Reports baby is latching and nursing well but has been cluster feedings.  Encouraged baby to allowed to cluster feed and mom continue to feed on demand. Discouraged supplementing with formula at this time.

## 2018-05-28 NOTE — DISCHARGE INSTR - APPOINTMENTS
***Please call on Tuesday morning and schedule an appointment with Dr. Bailey for 1 week postpartum for an incision, blood pressure, and blood sugar check***

## 2018-05-28 NOTE — LACTATION NOTE
05/27/18 1620   Maternal Information   Date of Referral 05/27/18   Person Making Referral patient   Maternal Assessment   Breast Size Issue none   Breast Shape Bilateral:;round   Breast Density Bilateral:;soft   Nipples Bilateral:;everted;graspable   Mom states breastfeeding has been going well. Helped with cross cradle hold on Right and Left and improved latch and position. Nursing staff intiated pumping. Encouraged Q3 hour feedings and pumping after feedings, if baby not breastfeeding well. To call for lactation services, if there are questions or concerns.

## 2018-05-28 NOTE — PLAN OF CARE
Problem: Patient Care Overview  Goal: Plan of Care Review  Outcome: Ongoing (interventions implemented as appropriate)   18 0814   Coping/Psychosocial   Plan of Care Reviewed With patient;spouse   Plan of Care Review   Progress improving   OTHER   Outcome Summary VSS. Breastfeeding and pumping Q 3 hours. Incision and lochia wnl. PO meds needed frequently to control pain.      Goal: Individualization and Mutuality  Outcome: Ongoing (interventions implemented as appropriate)    Goal: Discharge Needs Assessment  Outcome: Ongoing (interventions implemented as appropriate)      Problem: Postpartum ( Delivery) (Adult,Obstetrics,Pediatric)  Goal: Signs and Symptoms of Listed Potential Problems Will be Absent, Minimized or Managed (Postpartum)  Outcome: Ongoing (interventions implemented as appropriate)      Problem: Breastfeeding (Adult,Obstetrics,Pediatric)  Goal: Signs and Symptoms of Listed Potential Problems Will be Absent, Minimized or Managed (Breastfeeding)  Outcome: Ongoing (interventions implemented as appropriate)

## 2018-05-28 NOTE — PLAN OF CARE
Problem: Patient Care Overview  Goal: Plan of Care Review  Outcome: Outcome(s) achieved Date Met: 18 151   Coping/Psychosocial   Plan of Care Reviewed With patient;spouse   Plan of Care Review   Progress improving   OTHER   Outcome Summary Discharge home; Prescriptions given; Follow up appointment to be scheduled by patient     Goal: Individualization and Mutuality  Outcome: Outcome(s) achieved Date Met: 18 151   Individualization   Patient Specific Goals (Include Timeframe) Ready for discharge     Goal: Discharge Needs Assessment  Outcome: Outcome(s) achieved Date Met: 18 151   Discharge Needs Assessment   Readmission Within the Last 30 Days no previous admission in last 30 days   Concerns to be Addressed no discharge needs identified   Patient/Family Anticipates Transition to home   Patient/Family Anticipated Services at Transition none   Transportation Concerns car, none   Transportation Anticipated family or friend will provide   Anticipated Changes Related to Illness none   Equipment Needed After Discharge none   Offered/Gave Vendor List no   Disability   Equipment Currently Used at Home none     Goal: Interprofessional Rounds/Family Conf  Outcome: Outcome(s) achieved Date Met: 18 151   Interdisciplinary Rounds/Family Conf   Participants nursing       Problem: Postpartum ( Delivery) (Adult,Obstetrics,Pediatric)  Goal: Signs and Symptoms of Listed Potential Problems Will be Absent, Minimized or Managed (Postpartum)  Outcome: Outcome(s) achieved Date Met: 18 151   Goal/Outcome Evaluation   Problems Assessed (Postpartum ) all   Problems Present (Postpartum ) none     Goal: Anesthesia/Sedation Recovery  Outcome: Outcome(s) achieved Date Met: 18      Problem: Breastfeeding (Adult,Obstetrics,Pediatric)  Goal: Signs and Symptoms of Listed Potential Problems Will be Absent, Minimized or Managed  (Breastfeeding)  Outcome: Outcome(s) achieved Date Met: 05/28/18 05/28/18 1513   Goal/Outcome Evaluation   Problems Assessed (Breastfeeding) all   Problems Present (Breastfeeding) none

## 2018-05-30 LAB
CYTO UR: NORMAL
LAB AP CASE REPORT: NORMAL
LAB AP CLINICAL INFORMATION: NORMAL
Lab: NORMAL
PATH REPORT.FINAL DX SPEC: NORMAL
PATH REPORT.GROSS SPEC: NORMAL

## 2018-07-09 ENCOUNTER — OFFICE VISIT (OUTPATIENT)
Dept: ENDOCRINOLOGY | Facility: CLINIC | Age: 38
End: 2018-07-09

## 2018-07-09 VITALS
WEIGHT: 226 LBS | DIASTOLIC BLOOD PRESSURE: 60 MMHG | HEART RATE: 70 BPM | SYSTOLIC BLOOD PRESSURE: 112 MMHG | HEIGHT: 66 IN | BODY MASS INDEX: 36.32 KG/M2 | OXYGEN SATURATION: 95 %

## 2018-07-09 DIAGNOSIS — O24.414 INSULIN CONTROLLED GESTATIONAL DIABETES MELLITUS (GDM) IN THIRD TRIMESTER: ICD-10-CM

## 2018-07-09 LAB
GLUCOSE BLDC GLUCOMTR-MCNC: 79 MG/DL (ref 70–130)
HBA1C MFR BLD: 5.6 %

## 2018-07-09 PROCEDURE — 83036 HEMOGLOBIN GLYCOSYLATED A1C: CPT | Performed by: INTERNAL MEDICINE

## 2018-07-09 PROCEDURE — 99213 OFFICE O/P EST LOW 20 MIN: CPT | Performed by: INTERNAL MEDICINE

## 2018-07-09 PROCEDURE — 82947 ASSAY GLUCOSE BLOOD QUANT: CPT | Performed by: INTERNAL MEDICINE

## 2018-07-09 NOTE — ASSESSMENT & PLAN NOTE
Blood sugar and 90 day average sugar reviewed  Results for orders placed or performed in visit on 07/09/18   POC Glucose Fingerstick   Result Value Ref Range    Glucose 79 70 - 130 mg/dL   POC Glycosylated Hemoglobin (Hb A1C)   Result Value Ref Range    Hemoglobin A1C 5.6 %     Average sugar is normal but close to prediabetes- discussed diet, exercise and goals pp   Discussed plan as above with goal toward weight loss and overall fitness  F/u here prn   Is pp and does not plan more children

## 2018-07-09 NOTE — PROGRESS NOTES
"Janell Terrell 37 y.o.  CC:Follow-up and Gestational Diabetes (after delivery on 5-, OB: Heike Bailey MD. birth weight was 6 lbs, 7 oz)      [place]: Follow-up and Gestational Diabetes (after delivery on 5-, OB: Heike Bailey MD. birth weight was 6 lbs, 7 oz)    Blood sugar and 90 day average sugar reviewed  Results for orders placed or performed in visit on 07/09/18   POC Glucose Fingerstick   Result Value Ref Range    Glucose 79 70 - 130 mg/dL   POC Glycosylated Hemoglobin (Hb A1C)   Result Value Ref Range    Hemoglobin A1C 5.6 %     Average sugar is normal   Commended efforts with dm during pregnancy  Long term goals of health and fitness reviewed  Recommended return to prepregnancy weight with eye on ideal body mass  Discussed whole food diet, avoid processed carbs norberto (along with sugared drinks  Signs and symptoms of diabetes including polydipsia, polyuria, cystitis and vaginitis discussed  Blood sugar cutoffs fasting >126 and pp anytime >200 a indicators of diabetes were reviewed  Need for yearly f/u with PCP discussed    Allergies   Allergen Reactions   • Doxycycline      Chemical esophagitis     • Avelox [Moxifloxacin] Rash   • Zithromax [Azithromycin] Rash       Current Outpatient Prescriptions:   •  albuterol (ACCUNEB) 1.25 MG/3ML nebulizer solution, Take 3 mL by nebulization Every 6 (Six) Hours As Needed for Wheezing or Shortness of Air., Disp: 90 mL, Rfl: 0  •  aspirin 81 MG EC tablet, Take 81 mg by mouth Daily., Disp: , Rfl:   •  cetirizine (zyrTEC) 10 MG tablet, Take 10 mg by mouth Daily., Disp: , Rfl:   •  Insulin Pen Needle (B-D UF III MINI PEN NEEDLES) 31G X 5 MM misc, 1 Units 3 (Three) Times a Day Before Meals., Disp: 100 each, Rfl: 1  •  Insulin Syringe-Needle U-100 30G X 5/16\" 0.3 ML misc, 1 Units Every Night., Disp: 100 each, Rfl: 1  •  losartan (COZAAR) 50 MG tablet, Take 1 tablet by mouth Daily., Disp: 60 tablet, Rfl: 0  •  omeprazole (priLOSEC) 40 MG capsule, Take 1 capsule by " mouth Daily., Disp: 30 capsule, Rfl: 0  •  ONETOUCH DELICA LANCETS 33G misc, Test blood sugars QID, Disp: 100 each, Rfl: 5  Patient Active Problem List    Diagnosis   • Pregnant [Z34.90]   • Maternal chronic hypertension in third trimester [O10.913]   • Insulin controlled gestational diabetes mellitus (GDM) in third trimester [O24.414]   • H/O Asthma [Z87.09]   • GERD [K21.9]   • Morbidly obese (CMS/HCC) [E66.01]   • Family history of clubfoot [Z82.69]   • Elderly multigravida in third trimester [O09.523]   • Previous  section [Z98.891]     Review of Systems   Constitutional: Negative for activity change, appetite change, chills, diaphoresis, fatigue, fever and unexpected weight change.   HENT: Negative for congestion, dental problem, drooling, ear discharge, ear pain, facial swelling, hearing loss, mouth sores, nosebleeds, postnasal drip, rhinorrhea, sinus pressure, sneezing, sore throat, tinnitus, trouble swallowing and voice change.    Eyes: Negative for photophobia, pain, discharge, redness, itching and visual disturbance.   Respiratory: Negative for apnea, cough, choking, chest tightness, shortness of breath, wheezing and stridor.    Cardiovascular: Negative for chest pain, palpitations and leg swelling.   Gastrointestinal: Negative for abdominal distention, abdominal pain, anal bleeding, blood in stool, constipation, diarrhea, nausea, rectal pain and vomiting.   Endocrine: Negative for cold intolerance, heat intolerance, polydipsia, polyphagia and polyuria.   Genitourinary: Negative for decreased urine volume, difficulty urinating, dysuria, enuresis, flank pain, frequency, genital sores, hematuria and urgency.   Musculoskeletal: Negative for arthralgias, back pain, gait problem, joint swelling, myalgias, neck pain and neck stiffness.   Skin: Negative for color change, pallor, rash and wound.   Allergic/Immunologic: Negative for environmental allergies, food allergies and immunocompromised state.  "  Neurological: Negative for dizziness, tremors, seizures, syncope, facial asymmetry, speech difficulty, weakness, light-headedness, numbness and headaches.   Hematological: Negative for adenopathy. Does not bruise/bleed easily.   Psychiatric/Behavioral: Negative for agitation, behavioral problems, confusion, decreased concentration, dysphoric mood, hallucinations, self-injury, sleep disturbance and suicidal ideas. The patient is not nervous/anxious and is not hyperactive.      Social History     Social History   • Marital status:      Spouse name: N/A   • Number of children: N/A   • Years of education: N/A     Occupational History   • Not on file.     Social History Main Topics   • Smoking status: Never Smoker   • Smokeless tobacco: Never Used   • Alcohol use Yes      Comment: occasional use when not pregnant   • Drug use: No   • Sexual activity: Yes     Partners: Male     Other Topics Concern   • Not on file     Social History Narrative        Has a 9-year-old child with history of asthma    Works as a pediatric nurse    Doesn't drink alcohol    Lifelong nonsmoker     Family History   Problem Relation Age of Onset   • Hypothyroidism Mother    • Hypothyroidism Father    • Asthma Sister    • Hypothyroidism Sister    • Hypothyroidism Brother    • Asthma Brother      /60   Pulse 70   Ht 166.4 cm (65.5\")   Wt 103 kg (226 lb)   LMP 09/03/2017   SpO2 95%   Breastfeeding? Yes   BMI 37.04 kg/m²   Physical Exam   Constitutional: She is oriented to person, place, and time. She appears well-developed and well-nourished.   HENT:   Head: Normocephalic and atraumatic.   Nose: Nose normal.   Mouth/Throat: Oropharynx is clear and moist.   Eyes: Conjunctivae, EOM and lids are normal. Pupils are equal, round, and reactive to light.   Neck: Trachea normal and normal range of motion. Neck supple. Carotid bruit is not present. No tracheal deviation present. No thyroid mass and no thyromegaly present. "   Cardiovascular: Normal rate, regular rhythm, normal heart sounds and intact distal pulses.  Exam reveals no gallop and no friction rub.    No murmur heard.  Pulmonary/Chest: Effort normal and breath sounds normal. No respiratory distress. She has no wheezes.   Musculoskeletal: Normal range of motion. She exhibits no edema or deformity.   Lymphadenopathy:     She has no cervical adenopathy.   Neurological: She is alert and oriented to person, place, and time. She has normal reflexes. She displays normal reflexes. No cranial nerve deficit.   Skin: Skin is warm and dry. No rash noted. No cyanosis or erythema. Nails show no clubbing.   Psychiatric: She has a normal mood and affect. Her speech is normal and behavior is normal. Judgment and thought content normal. Cognition and memory are normal.   Nursing note and vitals reviewed.    Results for orders placed or performed during the hospital encounter of 05/22/18   CBC (No Diff)   Result Value Ref Range    WBC 15.07 (H) 3.50 - 10.80 10*3/mm3    RBC 4.03 3.89 - 5.14 10*6/mm3    Hemoglobin 11.7 11.5 - 15.5 g/dL    Hematocrit 35.6 34.5 - 44.0 %    MCV 88.3 80.0 - 99.0 fL    MCH 29.0 27.0 - 31.0 pg    MCHC 32.9 32.0 - 36.0 g/dL    RDW 14.5 11.3 - 14.5 %    RDW-SD 46.6 37.0 - 54.0 fl    MPV 10.6 6.0 - 12.0 fL    Platelets 334 150 - 450 10*3/mm3   Preeclampsia Panel   Result Value Ref Range    Alkaline Phosphatase 69 25 - 100 U/L    ALT (SGPT) 17 7 - 40 U/L    AST (SGOT) 16 0 - 33 U/L    Creatinine 0.70 0.60 - 1.30 mg/dL    Total Bilirubin 0.3 0.3 - 1.2 mg/dL     120 - 246 U/L    Uric Acid 5.4 3.1 - 7.8 mg/dL   CBC (No Diff)   Result Value Ref Range    WBC 13.47 (H) 3.50 - 10.80 10*3/mm3    RBC 3.93 3.89 - 5.14 10*6/mm3    Hemoglobin 11.4 (L) 11.5 - 15.5 g/dL    Hematocrit 35.0 34.5 - 44.0 %    MCV 89.1 80.0 - 99.0 fL    MCH 29.0 27.0 - 31.0 pg    MCHC 32.6 32.0 - 36.0 g/dL    RDW 14.4 11.3 - 14.5 %    RDW-SD 46.8 37.0 - 54.0 fl    MPV 10.8 6.0 - 12.0 fL    Platelets  362 150 - 450 10*3/mm3   Preeclampsia Panel   Result Value Ref Range    Alkaline Phosphatase 61 25 - 100 U/L    ALT (SGPT) 19 7 - 40 U/L    AST (SGOT) 16 0 - 33 U/L    Creatinine 0.70 0.60 - 1.30 mg/dL    Total Bilirubin 0.4 0.3 - 1.2 mg/dL     120 - 246 U/L    Uric Acid 5.2 3.1 - 7.8 mg/dL   Hepatitis B Surface Antigen   Result Value Ref Range    External Hepatitis B Surface Ag Negative    RPR   Result Value Ref Range    External RPR Non-Reactive    Rubella Antibody, IgG   Result Value Ref Range    External Rubella Qual Non-Immune    HIV-1 Antibody, EIA   Result Value Ref Range    External HIV Antibody Non-Reactive    CBC Auto Differential   Result Value Ref Range    WBC 12.93 (H) 3.50 - 10.80 10*3/mm3    RBC 3.58 (L) 3.89 - 5.14 10*6/mm3    Hemoglobin 10.4 (L) 11.5 - 15.5 g/dL    Hematocrit 31.9 (L) 34.5 - 44.0 %    MCV 89.1 80.0 - 99.0 fL    MCH 29.1 27.0 - 31.0 pg    MCHC 32.6 32.0 - 36.0 g/dL    RDW 14.7 (H) 11.3 - 14.5 %    RDW-SD 47.9 37.0 - 54.0 fl    MPV 10.5 6.0 - 12.0 fL    Platelets 295 150 - 450 10*3/mm3    Neutrophil % 78.7 (H) 41.0 - 71.0 %    Lymphocyte % 12.6 (L) 24.0 - 44.0 %    Monocyte % 6.4 0.0 - 12.0 %    Eosinophil % 2.1 0.0 - 3.0 %    Basophil % 0.2 0.0 - 1.0 %    Immature Grans % 0.3 0.0 - 0.6 %    Neutrophils, Absolute 10.17 (H) 1.50 - 8.30 10*3/mm3    Lymphocytes, Absolute 1.63 0.60 - 4.80 10*3/mm3    Monocytes, Absolute 0.83 0.00 - 1.00 10*3/mm3    Eosinophils, Absolute 0.27 0.00 - 0.30 10*3/mm3    Basophils, Absolute 0.03 0.00 - 0.20 10*3/mm3    Immature Grans, Absolute 0.04 (H) 0.00 - 0.03 10*3/mm3   POC Glucose Once   Result Value Ref Range    Glucose 98 70 - 130 mg/dL   POC Glucose Once   Result Value Ref Range    Glucose 112 70 - 130 mg/dL   POC Glucose Once   Result Value Ref Range    Glucose 133 (H) 70 - 130 mg/dL   POC Glucose Once   Result Value Ref Range    Glucose 104 70 - 130 mg/dL   POC Glucose Once   Result Value Ref Range    Glucose 125 70 - 130 mg/dL   POC Glucose  Once   Result Value Ref Range    Glucose 112 70 - 130 mg/dL   POC Glucose Once   Result Value Ref Range    Glucose 197 (H) 70 - 130 mg/dL   POC Glucose Once   Result Value Ref Range    Glucose 99 70 - 130 mg/dL   POC Glucose Once   Result Value Ref Range    Glucose 106 70 - 130 mg/dL   POC Glucose Once   Result Value Ref Range    Glucose 106 70 - 130 mg/dL   POC Glucose Once   Result Value Ref Range    Glucose 87 70 - 130 mg/dL   POC Glucose Once   Result Value Ref Range    Glucose 110 70 - 130 mg/dL   POC Glucose Once   Result Value Ref Range    Glucose 127 70 - 130 mg/dL   POC Glucose Once   Result Value Ref Range    Glucose 133 (H) 70 - 130 mg/dL   POC Glucose Once   Result Value Ref Range    Glucose 86 70 - 130 mg/dL   POC Glucose Once   Result Value Ref Range    Glucose 70 70 - 130 mg/dL   POC Glucose Once   Result Value Ref Range    Glucose 78 70 - 130 mg/dL   POC Glucose Once   Result Value Ref Range    Glucose 71 70 - 130 mg/dL   POC Glucose Once   Result Value Ref Range    Glucose 89 70 - 130 mg/dL   POC Glucose Once   Result Value Ref Range    Glucose 81 70 - 130 mg/dL   POC Glucose Once   Result Value Ref Range    Glucose 89 70 - 130 mg/dL   POC Glucose Once   Result Value Ref Range    Glucose 116 70 - 130 mg/dL   POC Glucose Once   Result Value Ref Range    Glucose 122 70 - 130 mg/dL   POC Glucose Once   Result Value Ref Range    Glucose 160 (H) 70 - 130 mg/dL   POC Glucose Once   Result Value Ref Range    Glucose 104 70 - 130 mg/dL   Type & Screen   Result Value Ref Range    ABO Type O     RH type Positive     Antibody Screen Negative     T&S Expiration Date 5/25/2018 11:59:59 PM    Type & Screen   Result Value Ref Range    ABO Type O     RH type Positive     Antibody Screen Negative     T&S Expiration Date 5/29/2018 11:59:59 PM    Tissue Pathology Exam   Result Value Ref Range    Case Report       Surgical Pathology Report                         Case: [id number]                                   Authorizing Provider:  Terrence Dutta MD     Collected:           05/26/2018 12:36 PM          Ordering Location:     [facility]   Received:            05/29/2018 08:19 AM                                 LABOR DELIVERY                                                               Pathologist:           Charan Cai MD                                                            Specimen:    Fallopian Tubes, Bilateral                                                                 Clinical Information       The working history is desires sterilization.      Final Diagnosis       RIGHT AND LEFT FALLOPIAN TUBE SEGMENTS:  Histologically unremarkable fallopian tube segments.   DGD/klb         Gross Description       Received in formalin labeled as right and left fallopian tube segments are two tan/gray glistening fallopian tube segments which average 1.0 cm in length by 0.7 cm in diameter.  Fimbria are not present.  The tube segments are undesignated as to laterality.  No cysts or other gross lesions are identified.  One of the tube segments is inked blue and the specimen is submitted entirely intact in block 1-A to be sectioned after processing.  LED/dlb        Microscopic Description       The bilateral tubal segments show patent lumina and no plical fusion or inflammation.      Embedded Images       Janell was seen today for follow-up and gestational diabetes.    Diagnoses and all orders for this visit:    Gestational diabetes mellitus (GDM), delivered  -     POC Glucose Fingerstick  -     POC Glycosylated Hemoglobin (Hb A1C)      Problem List Items Addressed This Visit        Endocrine    Gestational diabetes mellitus (GDM), delivered - Primary     Blood sugar and 90 day average sugar reviewed  Results for orders placed or performed in visit on 07/09/18   POC Glucose Fingerstick   Result Value Ref Range    Glucose 79 70 - 130 mg/dL   POC Glycosylated Hemoglobin (Hb A1C)   Result Value Ref Range     Hemoglobin A1C 5.6 %     Average sugar is normal but close to prediabetes- discussed diet, exercise and goals pp   Discussed plan as above with goal toward weight loss and overall fitness  F/u here prn   Is pp and does not plan more children         Relevant Orders    POC Glucose Fingerstick (Completed)    POC Glycosylated Hemoglobin (Hb A1C) (Completed)        Return if symptoms worsen or fail to improve, for Recheck.    Sadia Chapa MA  Signed Gina Lofton MD

## 2020-12-01 ENCOUNTER — TELEPHONE (OUTPATIENT)
Dept: OBSTETRICS AND GYNECOLOGY | Facility: CLINIC | Age: 40
End: 2020-12-01

## 2020-12-01 NOTE — TELEPHONE ENCOUNTER
Pt called stating she need an annual and a ultrasound for her fibroids. Wasn't sure it it needed to be check by a nurse first for the ultrasound.

## 2021-03-19 NOTE — ASSESSMENT & PLAN NOTE
She will begin testing sugars fasting and 2 hours after meals and will fax blood sugars weekly to [email or web address].  We will plan to see her back in 3-4 weeks, or sooner if problems or questions.  Thank you for this referral and please do not hesitate to call for any problems or questions.  Due to higher sugars after meals and fasting would recc d/c glyburide  Start nph 6 unit at hs  Start novolog 4 u before meals tid  email sugars every 2-3 days to titrate   F/u 4 weeks    immune

## 2021-04-04 NOTE — PLAN OF CARE
Problem: Diabetes in Pregnancy (Adult,Obstetrics,Pediatric)  Goal: Signs and Symptoms of Listed Potential Problems Will be Absent, Minimized or Managed (Diabetes in Pregnancy)  Outcome: Ongoing (interventions implemented as appropriate)   05/25/18 0806   Goal/Outcome Evaluation   Problems Assessed (Diabetes in Pregnancy) all   Problems Present (Diabetes with Pregnancy) none          normal...

## 2021-07-16 ENCOUNTER — OFFICE VISIT (OUTPATIENT)
Dept: OBSTETRICS AND GYNECOLOGY | Facility: CLINIC | Age: 41
End: 2021-07-16

## 2021-07-16 VITALS
BODY MASS INDEX: 34.09 KG/M2 | SYSTOLIC BLOOD PRESSURE: 122 MMHG | DIASTOLIC BLOOD PRESSURE: 80 MMHG | WEIGHT: 204.6 LBS | HEIGHT: 65 IN

## 2021-07-16 DIAGNOSIS — Z01.419 WOMEN'S ANNUAL ROUTINE GYNECOLOGICAL EXAMINATION: ICD-10-CM

## 2021-07-16 DIAGNOSIS — Z71.85 HPV VACCINE COUNSELING: ICD-10-CM

## 2021-07-16 DIAGNOSIS — D21.9 FIBROIDS: Primary | ICD-10-CM

## 2021-07-16 DIAGNOSIS — Z01.419 PAP TEST, AS PART OF ROUTINE GYNECOLOGICAL EXAMINATION: ICD-10-CM

## 2021-07-16 DIAGNOSIS — Z12.39 ENCOUNTER FOR BREAST CANCER SCREENING USING NON-MAMMOGRAM MODALITY: ICD-10-CM

## 2021-07-16 PROBLEM — Z34.90 PREGNANT: Status: RESOLVED | Noted: 2018-05-23 | Resolved: 2021-07-16

## 2021-07-16 PROBLEM — O10.913 MATERNAL CHRONIC HYPERTENSION IN THIRD TRIMESTER: Status: RESOLVED | Noted: 2018-03-29 | Resolved: 2021-07-16

## 2021-07-16 PROCEDURE — 99395 PREV VISIT EST AGE 18-39: CPT | Performed by: OBSTETRICS & GYNECOLOGY

## 2021-07-16 RX ORDER — MONTELUKAST SODIUM 10 MG/1
TABLET ORAL
COMMUNITY
Start: 2021-07-07 | End: 2023-02-27 | Stop reason: SDUPTHER

## 2021-07-16 NOTE — PROGRESS NOTES
GYN Annual Exam     CC - Here for annual exam.     Subjective   HPI  Janell Terrell is a 40 y.o. female, , who presents for annual well woman exam. Patient's last menstrual period was 2021..  Periods are regular every 25-35 days, lasting 5 days. The patient uses 1 of tampons/pads per hour. Dysmenorrhea:moderate, occurring first 1-2 days of flow.  Patient reports problems with: breast tenderness and intermenstrual bleeding.  Partner Status: Marital Status: .  New Partners since last visit: no.  Desires STD Screening: no.  Patient has not had the HPV vaccine.     Patient had an US today for h/o firboids. She reports some breast tenderness on the left breast. She denies any bleeding or pain currently    Her mother  in April and she has started and SSRI for grief/anxiety that has led to sexual dysfunction but she is feeling much better.    Additional OB/GYN History     Current contraception: contraceptive methods: Tubal ligation  Desires to: do not start contraception  Last Pap :   Last Completed Pap Smear     This patient has no relevant Health Maintenance data.        History of abnormal Pap smear: no  Family history of uterine, colon, breast, or ovarian cancer: yes -    Previous Mammogram :  no  Performs monthly Self-Breast Exam: no  Exercises Regularly:no  Feelings of Anxiety or Depression: no  Tobacco Usage?: No   OB History        2    Para   2    Term   1       1    AB   0    Living   2       SAB   0    TAB   0    Ectopic   0    Molar   0    Multiple   0    Live Births   2                Health Maintenance   Topic Date Due   • Annual Gynecologic Pelvic and Breast Exam  Never done   • ANNUAL PHYSICAL  Never done   • HEPATITIS C SCREENING  Never done   • TDAP/TD VACCINES (2 - Td or Tdap) 2019   • PAP SMEAR  2021   • INFLUENZA VACCINE  2021   • COVID-19 Vaccine  Completed   • Hepatitis B  Aged Out   • Pneumococcal Vaccine 0-64  Aged Out           The  "additional following portions of the patient's history were reviewed and updated as appropriate: allergies, current medications, past family history, past medical history, past social history and past surgical history.    Review of Systems   Constitutional: Negative.    HENT: Negative.    Eyes: Negative.    Respiratory: Negative.    Cardiovascular: Negative.    Gastrointestinal: Negative.    Endocrine: Negative.    Genitourinary: Negative.  Positive for breast pain (tenderness).   Musculoskeletal: Negative.    Skin: Negative.    Allergic/Immunologic: Negative.    Neurological: Negative.    Hematological: Negative.    Psychiatric/Behavioral: Negative.      Past Surgical History:   Procedure Laterality Date   •  SECTION     •  SECTION WITH TUBAL Bilateral 2018    Procedure:  SECTION REPEAT WITH TUBAL * 37 WKS - GEST. DIABETIC, ELEVATED BPS*;  Surgeon: Terrence Dutta MD;  Location: [facility] LABOR DELIVERY;  Service: Obstetrics/Gynecology   • WISDOM TOOTH EXTRACTION       I have reviewed and agree with the HPI, ROS, and historical information as entered above. Heike Bailey MD    Objective   /80   Ht 165.1 cm (65\")   Wt 92.8 kg (204 lb 9.6 oz)   LMP 2021   BMI 34.05 kg/m²     Physical Exam  Vitals and nursing note reviewed. Exam conducted with a chaperone present.   Constitutional:       Appearance: She is well-developed.   HENT:      Head: Normocephalic and atraumatic.   Neck:      Thyroid: No thyroid mass or thyromegaly.   Cardiovascular:      Rate and Rhythm: Normal rate and regular rhythm.      Heart sounds: No murmur heard.     Pulmonary:      Effort: Pulmonary effort is normal. No retractions.      Breath sounds: Normal breath sounds. No wheezing, rhonchi or rales.   Chest:      Chest wall: No mass or tenderness.      Breasts:         Right: Normal. No mass, nipple discharge, skin change or tenderness.         Left: Normal. No mass, nipple discharge, skin " change or tenderness.   Abdominal:      General: Bowel sounds are normal.      Palpations: Abdomen is soft. Abdomen is not rigid. There is no mass.      Tenderness: There is no abdominal tenderness. There is no guarding.      Hernia: No hernia is present. There is no hernia in the left inguinal area.   Genitourinary:     Labia:         Right: No rash, tenderness or lesion.         Left: No rash, tenderness or lesion.       Vagina: Normal. No vaginal discharge or lesions.      Cervix: No cervical motion tenderness, discharge, lesion or cervical bleeding.      Uterus: Normal. Not enlarged, not fixed and not tender.       Adnexa:         Right: No mass or tenderness.          Left: No mass or tenderness.        Rectum: No external hemorrhoid.   Musculoskeletal:      Cervical back: Normal range of motion. No muscular tenderness.   Neurological:      Mental Status: She is alert and oriented to person, place, and time.   Psychiatric:         Behavior: Behavior normal.         Assessment/Plan       Encounter Diagnoses   Name Primary?   • Fibroids Yes   • Pap test, as part of routine gynecological examination    • Women's annual routine gynecological examination    • HPV vaccine counseling    • Encounter for breast cancer screening using non-mammogram modality        Plan     1. Recommended use of Vitamin D replacement and getting adequate calcium in her diet. (1500mg)  2. Reviewed monthly self breast exams.  Instructed to call with lumps, pain, or breast discharge.    3. Start yearly mammography  4. Reviewed HPV guidelines and encouraged consideration of HPV vaccine  5. Reviewed exercise as a preventative health measures.    6. No evidence of fibroid on todays u/s.  7.  Reviewed potiential etiologies for breast pain.  Nothing abnormal on exam.      Heike Bailey MD  07/16/2021

## 2022-05-24 ENCOUNTER — HOSPITAL ENCOUNTER (OUTPATIENT)
Dept: MAMMOGRAPHY | Facility: HOSPITAL | Age: 42
Discharge: HOME | End: 2022-05-24
Admitting: OBSTETRICS & GYNECOLOGY

## 2022-05-24 DIAGNOSIS — Z12.39 ENCOUNTER FOR BREAST CANCER SCREENING USING NON-MAMMOGRAM MODALITY: ICD-10-CM

## 2022-05-24 PROCEDURE — 77063 BREAST TOMOSYNTHESIS BI: CPT | Performed by: RADIOLOGY

## 2022-05-24 PROCEDURE — 77067 SCR MAMMO BI INCL CAD: CPT

## 2022-05-24 PROCEDURE — 77063 BREAST TOMOSYNTHESIS BI: CPT

## 2022-05-24 PROCEDURE — 77067 SCR MAMMO BI INCL CAD: CPT | Performed by: RADIOLOGY

## 2022-05-31 DIAGNOSIS — Z12.39 BREAST CANCER SCREENING, HIGH RISK PATIENT: Primary | ICD-10-CM

## 2022-09-16 ENCOUNTER — OFFICE VISIT (OUTPATIENT)
Dept: PULMONOLOGY | Facility: CLINIC | Age: 42
End: 2022-09-16

## 2022-09-16 ENCOUNTER — OFFICE VISIT (OUTPATIENT)
Dept: PULMONOLOGY | Facility: CLINIC | Age: 42
End: 2022-09-16
Payer: COMMERCIAL

## 2022-09-16 ENCOUNTER — HOSPITAL ENCOUNTER (OUTPATIENT)
Dept: GENERAL RADIOLOGY | Facility: HOSPITAL | Age: 42
Discharge: HOME | End: 2022-09-16
Admitting: NURSE PRACTITIONER

## 2022-09-16 VITALS
DIASTOLIC BLOOD PRESSURE: 75 MMHG | HEIGHT: 65 IN | WEIGHT: 211 LBS | RESPIRATION RATE: 16 BRPM | BODY MASS INDEX: 35.16 KG/M2 | SYSTOLIC BLOOD PRESSURE: 128 MMHG | HEART RATE: 70 BPM | OXYGEN SATURATION: 98 %

## 2022-09-16 VITALS
HEIGHT: 65 IN | RESPIRATION RATE: 16 BRPM | OXYGEN SATURATION: 98 % | SYSTOLIC BLOOD PRESSURE: 128 MMHG | WEIGHT: 211 LBS | DIASTOLIC BLOOD PRESSURE: 75 MMHG | HEART RATE: 70 BPM | BODY MASS INDEX: 35.16 KG/M2

## 2022-09-16 DIAGNOSIS — R05.3 CHRONIC COUGH: Primary | ICD-10-CM

## 2022-09-16 DIAGNOSIS — J45.50 SEVERE PERSISTENT ASTHMA WITHOUT COMPLICATION: ICD-10-CM

## 2022-09-16 DIAGNOSIS — J30.9 ALLERGIC RHINITIS, UNSPECIFIED SEASONALITY, UNSPECIFIED TRIGGER: ICD-10-CM

## 2022-09-16 DIAGNOSIS — Z91.199 NO-SHOW FOR APPOINTMENT: Primary | ICD-10-CM

## 2022-09-16 DIAGNOSIS — K21.9 GERD WITHOUT ESOPHAGITIS: ICD-10-CM

## 2022-09-16 DIAGNOSIS — R05.3 CHRONIC COUGH: ICD-10-CM

## 2022-09-16 PROCEDURE — 71046 X-RAY EXAM CHEST 2 VIEWS: CPT

## 2022-09-16 PROCEDURE — 99204 OFFICE O/P NEW MOD 45 MIN: CPT | Performed by: NURSE PRACTITIONER

## 2022-09-16 RX ORDER — TRIAMCINOLONE ACETONIDE 1 MG/G
CREAM TOPICAL
COMMUNITY
Start: 2022-08-31

## 2022-09-16 RX ORDER — FLUNISOLIDE 0.25 MG/ML
2 SOLUTION NASAL EVERY 12 HOURS
COMMUNITY

## 2022-09-16 RX ORDER — FLUCONAZOLE 150 MG/1
150 TABLET ORAL ONCE
COMMUNITY
Start: 2022-06-13

## 2022-09-16 NOTE — PROGRESS NOTES
"     New Pulmonary Patient Office Visit      Patient Name: Janell Terrell    Referring Physician: Arpit Pavon*    Chief Complaint:  Asthma      History of Present Illness: Janell Terrell is a 42 y.o. female who is here today to establish care with Pulmonary for asthma. She previously saw Dr. Prakash at [facility] ~4 years ago. She was transitioned from Advair to Breo at that time and has continued on Breo since. She notes a chronic cough which has been apparent for years and is productive of clear sputum. Her cough is worse with laughing and after eating and she always feels \"crackly\" in her chest. She is on a PPI and feels that her reflux is well controlled. +allergic rhinitis treated with Zyrtec, Singulair, and nasal steroid. She is a NICU nurse at . She tolerates ADLs well.    Duration: Asthma diagnosed in childhood  Timing: Daily  Modifying Factors: Worse with season changes  Supplemental Oxygen: No  Number of exacerbations per year: 2    Subjective      Review of Systems:   Review of Systems   Constitutional: Negative for fever and unexpected weight change.   Respiratory: Positive for cough, shortness of breath and wheezing.    Cardiovascular: Negative for chest pain and leg swelling.   Allergic/Immunologic: Positive for environmental allergies.        Past Medical History:   Past Medical History:   Diagnosis Date   • Anxiety and depression     situational;  was deployed   • Asthma    • Diabetes mellitus (HCC)     gestational   • GERD (gastroesophageal reflux disease)    • Gestational diabetes    • Hypertension     Had preeclampsia and CHTN since then   • Migraines        Past Surgical History:   Past Surgical History:   Procedure Laterality Date   •  SECTION     •  SECTION WITH TUBAL Bilateral 2018    Procedure:  SECTION REPEAT WITH TUBAL * 37 WKS - GEST. DIABETIC, ELEVATED BPS*;  Surgeon: Terrence Dutta MD;  Location: [facility] LABOR DELIVERY; " " Service: Obstetrics/Gynecology   • WISDOM TOOTH EXTRACTION  1998       Family History:   Family History   Problem Relation Age of Onset   • Breast cancer Mother         Lumpectomy   • Hypothyroidism Mother    • Hypothyroidism Father    • Asthma Sister    • Hypothyroidism Sister    • Hypothyroidism Brother    • Asthma Brother    • Ovarian cancer Neg Hx        Social History:   Social History     Socioeconomic History   • Marital status:    Tobacco Use   • Smoking status: Never Smoker   • Smokeless tobacco: Never Used   Substance and Sexual Activity   • Alcohol use: Yes     Comment: occasional use when not pregnant   • Drug use: No   • Sexual activity: Yes     Partners: Male       Medications:     Current Outpatient Medications:   •  albuterol (ACCUNEB) 1.25 MG/3ML nebulizer solution, Take 3 mL by nebulization Every 6 (Six) Hours As Needed for Wheezing or Shortness of Air., Disp: 90 mL, Rfl: 0  •  aspirin 81 MG EC tablet, Take 81 mg by mouth Daily., Disp: , Rfl:   •  Breo Ellipta 100-25 MCG/INH inhaler, INHALE 1 PUFF BY MOUTH EVERY DAY AS DIRECTED, Disp: , Rfl:   •  cetirizine (zyrTEC) 10 MG tablet, Take 10 mg by mouth Daily., Disp: , Rfl:   •  fluconazole (DIFLUCAN) 150 MG tablet, Take 150 mg by mouth 1 (One) Time., Disp: , Rfl:   •  Insulin Pen Needle (B-D UF III MINI PEN NEEDLES) 31G X 5 MM misc, 1 Units 3 (Three) Times a Day Before Meals., Disp: 100 each, Rfl: 1  •  Insulin Syringe-Needle U-100 30G X 5/16\" 0.3 ML misc, 1 Units Every Night., Disp: 100 each, Rfl: 1  •  losartan (COZAAR) 50 MG tablet, Take 1 tablet by mouth Daily., Disp: 60 tablet, Rfl: 0  •  montelukast (SINGULAIR) 10 MG tablet, , Disp: , Rfl:   •  omeprazole (priLOSEC) 40 MG capsule, Take 1 capsule by mouth Daily., Disp: 30 capsule, Rfl: 0  •  ONETOUCH DELICA LANCETS 33G misc, Test blood sugars QID, Disp: 100 each, Rfl: 5  •  sertraline (ZOLOFT) 50 MG tablet, TAKE 1/2 A TABLET BY MOUTH ONE TIME A DAY FOR 5 DAYS, THEN TAKE 1 TABLET ONE TIME " "A DAY THEREAFTER., Disp: , Rfl:   •  triamcinolone (KENALOG) 0.1 % cream, Mix with tub of Cetaphil cream and apply from neck to feet twice daily as directed., Disp: , Rfl:   · Flunisolide nasal spray    Allergies:   Allergies   Allergen Reactions   • Doxycycline      Chemical esophagitis     • Avelox [Moxifloxacin] Rash   • Zithromax [Azithromycin] Rash       Objective     Physical Exam:  Vital Signs:   Vitals:    09/16/22 0959   BP: 128/75   Pulse: 70   Resp: 16   SpO2: 98%   Weight: 95.7 kg (211 lb)   Height: 165.1 cm (65\")     Body mass index is 35.11 kg/m².    Physical Exam  Constitutional:       General: She is not in acute distress.     Appearance: She is not toxic-appearing.   HENT:      Head: Normocephalic and atraumatic.   Eyes:      Extraocular Movements: Extraocular movements intact.   Cardiovascular:      Rate and Rhythm: Normal rate and regular rhythm.      Heart sounds: Normal heart sounds.   Pulmonary:      Effort: Pulmonary effort is normal.      Breath sounds: Wheezing and rhonchi present.   Abdominal:      General: There is no distension.   Musculoskeletal:         General: No swelling.      Cervical back: Neck supple.   Skin:     General: Skin is warm and dry.      Findings: No rash.   Neurological:      General: No focal deficit present.      Mental Status: She is alert and oriented to person, place, and time.   Psychiatric:         Mood and Affect: Mood normal.         Behavior: Behavior normal.       Results Review:   WBC   Date Value Ref Range Status   05/27/2018 12.93 (H) 3.50 - 10.80 10*3/mm3 Final     RBC   Date Value Ref Range Status   05/27/2018 3.58 (L) 3.89 - 5.14 10*6/mm3 Final     Hemoglobin   Date Value Ref Range Status   05/27/2018 10.4 (L) 11.5 - 15.5 g/dL Final     Hematocrit   Date Value Ref Range Status   05/27/2018 31.9 (L) 34.5 - 44.0 % Final     MCV   Date Value Ref Range Status   05/27/2018 89.1 80.0 - 99.0 fL Final     MCH   Date Value Ref Range Status   05/27/2018 29.1 " 27.0 - 31.0 pg Final     MCHC   Date Value Ref Range Status   05/27/2018 32.6 32.0 - 36.0 g/dL Final     RDW   Date Value Ref Range Status   05/27/2018 14.7 (H) 11.3 - 14.5 % Final     RDW-SD   Date Value Ref Range Status   05/27/2018 47.9 37.0 - 54.0 fl Final     MPV   Date Value Ref Range Status   05/27/2018 10.5 6.0 - 12.0 fL Final     Platelets   Date Value Ref Range Status   05/27/2018 295 150 - 450 10*3/mm3 Final     Neutrophil %   Date Value Ref Range Status   05/27/2018 78.7 (H) 41.0 - 71.0 % Final     Lymphocyte %   Date Value Ref Range Status   05/27/2018 12.6 (L) 24.0 - 44.0 % Final     Monocyte %   Date Value Ref Range Status   05/27/2018 6.4 0.0 - 12.0 % Final     Eosinophil %   Date Value Ref Range Status   05/27/2018 2.1 0.0 - 3.0 % Final     Basophil %   Date Value Ref Range Status   05/27/2018 0.2 0.0 - 1.0 % Final     Immature Grans %   Date Value Ref Range Status   05/27/2018 0.3 0.0 - 0.6 % Final     Neutrophils, Absolute   Date Value Ref Range Status   05/27/2018 10.17 (H) 1.50 - 8.30 10*3/mm3 Final     Lymphocytes, Absolute   Date Value Ref Range Status   05/27/2018 1.63 0.60 - 4.80 10*3/mm3 Final     Monocytes, Absolute   Date Value Ref Range Status   05/27/2018 0.83 0.00 - 1.00 10*3/mm3 Final     Eosinophils, Absolute   Date Value Ref Range Status   05/27/2018 0.27 0.00 - 0.30 10*3/mm3 Final     Basophils, Absolute   Date Value Ref Range Status   05/27/2018 0.03 0.00 - 0.20 10*3/mm3 Final     Immature Grans, Absolute   Date Value Ref Range Status   05/27/2018 0.04 (H) 0.00 - 0.03 10*3/mm3 Final     nRBC   Date Value Ref Range Status   02/24/2018 0.0 0.0 - 0.0 /100 WBC Final     Results for orders placed during the hospital encounter of 02/14/18    Adult Transthoracic Echo Complete W/ Cont if Necessary Per Protocol    Interpretation Summary  · Left ventricular systolic function is normal. Estimated EF = 70%.  · RVSP(TR) 16.0 mmHg    Assessment / Plan      Assessment/Plan:    Diagnoses and all  orders for this visit:    1. Chronic cough (Primary)  -     XR Chest 2 View; Future  Likely multifactorial given below diagnoses.  Obtain updated chest imaging.    2. Severe persistent asthma without complication  -     Fluticasone-Umeclidin-Vilant (Trelegy Ellipta) 200-62.5-25 MCG/INH inhaler; Inhale 1 puff Daily for 30 days. Rinse mouth out after use  Dispense: 1 each; Refill: 2  -     Pulmonary Function Test; Future  -     Alpha - 1 - Antitrypsin Deficiency; Future  Stop Breo and transition to high-dose Trelegy.  Obtain full PFTs prior to next clinic visit.  Review of prior labs show that eosinophils were as high as 450 in 2018.  Request most recent CBC from her PCPs office.  His symptoms remain uncontrolled despite high-dose ICS use, may need to consider use of an asthma biologic agent at her next visit.  Patient voiced understanding. We discussed the risk and benefits of inhaled corticosteroids. Patient instructed to take them on a regular basis as prescribed. Patient instructed to rinse their mouth out after each use. Trelegy coupon provided.    3. Allergic rhinitis, unspecified seasonality, unspecified trigger  Continues on oral antihistamine, Singulair and nasal steroid.    4. GERD without esophagitis  Well-controlled per patient report on PPI.    Follow Up:   Return in about 2 months (around 11/16/2022).  The patient was counseled on diagnostic results, risks and benefits of treatment options, risk factor modifications and the importance of treatment compliance. The patient was advised to contact the clinic with concerns or worsening symptoms.     GEGE Smith  Pulmonary Medicine [place]    Dictated with Aaron

## 2022-09-23 ENCOUNTER — HOSPITAL ENCOUNTER (OUTPATIENT)
Dept: GENERAL RADIOLOGY | Facility: HOSPITAL | Age: 42
Discharge: HOME | End: 2022-09-23

## 2022-09-23 ENCOUNTER — TRANSCRIBE ORDERS (OUTPATIENT)
Dept: LAB | Facility: HOSPITAL | Age: 42
End: 2022-09-23

## 2022-09-23 ENCOUNTER — LAB (OUTPATIENT)
Dept: LAB | Facility: HOSPITAL | Age: 42
End: 2022-09-23

## 2022-09-23 DIAGNOSIS — R73.09 IMPAIRED GLUCOSE TOLERANCE TEST: ICD-10-CM

## 2022-09-23 DIAGNOSIS — R42 DIZZINESS: ICD-10-CM

## 2022-09-23 DIAGNOSIS — R07.89 OTHER CHEST PAIN: Primary | ICD-10-CM

## 2022-09-23 DIAGNOSIS — R53.83 TIREDNESS: ICD-10-CM

## 2022-09-23 DIAGNOSIS — S99.921A TOE INJURY, RIGHT, INITIAL ENCOUNTER: ICD-10-CM

## 2022-09-23 DIAGNOSIS — R07.89 OTHER CHEST PAIN: ICD-10-CM

## 2022-09-23 DIAGNOSIS — R55 SYNCOPE AND COLLAPSE: ICD-10-CM

## 2022-09-23 DIAGNOSIS — M25.559 HIP PAIN: ICD-10-CM

## 2022-09-23 DIAGNOSIS — E55.9 VITAMIN D DEFICIENCY: ICD-10-CM

## 2022-09-23 DIAGNOSIS — D51.3 VEGANS' ANEMIA: ICD-10-CM

## 2022-09-23 DIAGNOSIS — J45.50 SEVERE PERSISTENT ASTHMA WITHOUT COMPLICATION: ICD-10-CM

## 2022-09-23 DIAGNOSIS — I10 HYPERTENSION, ESSENTIAL: ICD-10-CM
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
## 2022-09-23 DIAGNOSIS — E78.00 PURE HYPERCHOLESTEROLEMIA: ICD-10-CM

## 2022-09-23 LAB
BASOPHILS # BLD AUTO: 0.08 10*3/MM3 (ref 0–0.2)
BASOPHILS NFR BLD AUTO: 0.7 % (ref 0–1.5)
CK MB SERPL-CCNC: 2.33 NG/ML
DEPRECATED RDW RBC AUTO: 38.2 FL (ref 37–54)
EOSINOPHIL # BLD AUTO: 0.24 10*3/MM3 (ref 0–0.4)
EOSINOPHIL NFR BLD AUTO: 2 % (ref 0.3–6.2)
ERYTHROCYTE [DISTWIDTH] IN BLOOD BY AUTOMATED COUNT: 12 % (ref 12.3–15.4)
HBA1C MFR BLD: 5.8 % (ref 4.8–5.6)
HCT VFR BLD AUTO: 40.6 % (ref 34–46.6)
HGB BLD-MCNC: 14.4 G/DL (ref 12–15.9)
IMM GRANULOCYTES # BLD AUTO: 0.05 10*3/MM3 (ref 0–0.05)
IMM GRANULOCYTES NFR BLD AUTO: 0.4 % (ref 0–0.5)
LYMPHOCYTES # BLD AUTO: 2.23 10*3/MM3 (ref 0.7–3.1)
LYMPHOCYTES NFR BLD AUTO: 18.9 % (ref 19.6–45.3)
MCH RBC QN AUTO: 31.2 PG (ref 26.6–33)
MCHC RBC AUTO-ENTMCNC: 35.5 G/DL (ref 31.5–35.7)
MCV RBC AUTO: 88.1 FL (ref 79–97)
MONOCYTES # BLD AUTO: 0.81 10*3/MM3 (ref 0.1–0.9)
MONOCYTES NFR BLD AUTO: 6.9 % (ref 5–12)
MYOGLOBIN SERPL-MCNC: 36.6 NG/ML (ref 25–58)
NEUTROPHILS NFR BLD AUTO: 71.1 % (ref 42.7–76)
NEUTROPHILS NFR BLD AUTO: 8.38 10*3/MM3 (ref 1.7–7)
NRBC BLD AUTO-RTO: 0 /100 WBC (ref 0–0.2)
PLATELET # BLD AUTO: 386 10*3/MM3 (ref 140–450)
PMV BLD AUTO: 10.1 FL (ref 6–12)
RBC # BLD AUTO: 4.61 10*6/MM3 (ref 3.77–5.28)
TROPONIN T SERPL-MCNC: <0.01 NG/ML (ref 0–0.03)
WBC NRBC COR # BLD: 11.79 10*3/MM3 (ref 3.4–10.8)

## 2022-09-23 PROCEDURE — 80053 COMPREHEN METABOLIC PANEL: CPT

## 2022-09-23 PROCEDURE — 84484 ASSAY OF TROPONIN QUANT: CPT

## 2022-09-23 PROCEDURE — 83735 ASSAY OF MAGNESIUM: CPT

## 2022-09-23 PROCEDURE — 73521 X-RAY EXAM HIPS BI 2 VIEWS: CPT

## 2022-09-23 PROCEDURE — 86038 ANTINUCLEAR ANTIBODIES: CPT

## 2022-09-23 PROCEDURE — 80061 LIPID PANEL: CPT

## 2022-09-23 PROCEDURE — 36415 COLL VENOUS BLD VENIPUNCTURE: CPT

## 2022-09-23 PROCEDURE — 82746 ASSAY OF FOLIC ACID SERUM: CPT

## 2022-09-23 PROCEDURE — 84436 ASSAY OF TOTAL THYROXINE: CPT

## 2022-09-23 PROCEDURE — 73630 X-RAY EXAM OF FOOT: CPT

## 2022-09-23 PROCEDURE — 84443 ASSAY THYROID STIM HORMONE: CPT

## 2022-09-23 PROCEDURE — 82306 VITAMIN D 25 HYDROXY: CPT

## 2022-09-23 PROCEDURE — 84479 ASSAY OF THYROID (T3 OR T4): CPT

## 2022-09-23 PROCEDURE — 82607 VITAMIN B-12: CPT

## 2022-09-23 PROCEDURE — 83874 ASSAY OF MYOGLOBIN: CPT

## 2022-09-23 PROCEDURE — 83036 HEMOGLOBIN GLYCOSYLATED A1C: CPT

## 2022-09-23 PROCEDURE — 85025 COMPLETE CBC W/AUTO DIFF WBC: CPT

## 2022-09-23 PROCEDURE — 82553 CREATINE MB FRACTION: CPT

## 2022-09-24 LAB
25(OH)D3 SERPL-MCNC: 57.1 NG/ML (ref 30–100)
ALBUMIN SERPL-MCNC: 4.7 G/DL (ref 3.5–5.2)
ALBUMIN/GLOB SERPL: 1.9 G/DL
ALP SERPL-CCNC: 26 U/L (ref 39–117)
ALT SERPL W P-5'-P-CCNC: 33 U/L (ref 1–33)
ANION GAP SERPL CALCULATED.3IONS-SCNC: 13.2 MMOL/L (ref 5–15)
AST SERPL-CCNC: 17 U/L (ref 1–32)
BILIRUB SERPL-MCNC: 0.4 MG/DL (ref 0–1.2)
BUN SERPL-MCNC: 14 MG/DL (ref 6–20)
BUN/CREAT SERPL: 16.9 (ref 7–25)
CALCIUM SPEC-SCNC: 10.3 MG/DL (ref 8.6–10.5)
CHLORIDE SERPL-SCNC: 98 MMOL/L (ref 98–107)
CHOLEST SERPL-MCNC: 193 MG/DL (ref 0–200)
CO2 SERPL-SCNC: 24.8 MMOL/L (ref 22–29)
CREAT SERPL-MCNC: 0.83 MG/DL (ref 0.57–1)
EGFRCR SERPLBLD CKD-EPI 2021: 91 ML/MIN/1.73
FOLATE SERPL-MCNC: 13.1 NG/ML (ref 4.78–24.2)
GLOBULIN UR ELPH-MCNC: 2.5 GM/DL
GLUCOSE SERPL-MCNC: 80 MG/DL (ref 65–99)
HDLC SERPL-MCNC: 48 MG/DL (ref 40–60)
LDLC SERPL CALC-MCNC: 117 MG/DL (ref 0–100)
LDLC/HDLC SERPL: 2.37 {RATIO}
MAGNESIUM SERPL-MCNC: 2 MG/DL (ref 1.6–2.6)
POTASSIUM SERPL-SCNC: 4.3 MMOL/L (ref 3.5–5.2)
PROT SERPL-MCNC: 7.2 G/DL (ref 6–8.5)
SODIUM SERPL-SCNC: 136 MMOL/L (ref 136–145)
T-UPTAKE NFR SERPL: 0.93 TBI (ref 0.8–1.3)
T4 SERPL-MCNC: 8.9 MCG/DL (ref 4.5–11.7)
TRIGL SERPL-MCNC: 156 MG/DL (ref 0–150)
TSH SERPL DL<=0.05 MIU/L-ACNC: 1.63 UIU/ML (ref 0.27–4.2)
VIT B12 BLD-MCNC: 512 PG/ML (ref 211–946)
VLDLC SERPL-MCNC: 28 MG/DL (ref 5–40)

## 2022-09-26 LAB — ANA SER QL: NEGATIVE

## 2022-12-07 DIAGNOSIS — J45.50 SEVERE PERSISTENT ASTHMA WITHOUT COMPLICATION: Primary | ICD-10-CM

## 2022-12-07 RX ORDER — FLUTICASONE FUROATE, UMECLIDINIUM BROMIDE AND VILANTEROL TRIFENATATE 200; 62.5; 25 UG/1; UG/1; UG/1
1 POWDER RESPIRATORY (INHALATION) DAILY
Qty: 28 EACH | Refills: 3 | Status: SHIPPED | OUTPATIENT
Start: 2022-12-07 | End: 2023-02-27 | Stop reason: SDUPTHER

## 2023-02-08 ENCOUNTER — TELEPHONE (OUTPATIENT)
Dept: PULMONOLOGY | Facility: CLINIC | Age: 43
End: 2023-02-08
Payer: COMMERCIAL

## 2023-02-08 NOTE — TELEPHONE ENCOUNTER
Called and talked to patient. We have rescheduled this patient to come in and see Linda with a pft prior to the follow up visit

## 2023-02-27 ENCOUNTER — OFFICE VISIT (OUTPATIENT)
Dept: PULMONOLOGY | Facility: CLINIC | Age: 43
End: 2023-02-27
Payer: COMMERCIAL

## 2023-02-27 VITALS
BODY MASS INDEX: 35.16 KG/M2 | WEIGHT: 211 LBS | HEIGHT: 65 IN | HEART RATE: 82 BPM | SYSTOLIC BLOOD PRESSURE: 106 MMHG | OXYGEN SATURATION: 95 % | DIASTOLIC BLOOD PRESSURE: 66 MMHG

## 2023-02-27 DIAGNOSIS — Z14.8 ALPHA-1-ANTITRYPSIN DEFICIENCY CARRIER: ICD-10-CM

## 2023-02-27 DIAGNOSIS — J45.50 SEVERE PERSISTENT ASTHMA WITHOUT COMPLICATION: Primary | ICD-10-CM

## 2023-02-27 DIAGNOSIS — J45.50 SEVERE PERSISTENT ASTHMA WITHOUT COMPLICATION: ICD-10-CM

## 2023-02-27 DIAGNOSIS — J45.51 SEVERE PERSISTENT ASTHMA WITH EXACERBATION: ICD-10-CM

## 2023-02-27 DIAGNOSIS — J30.9 ALLERGIC RHINITIS, UNSPECIFIED SEASONALITY, UNSPECIFIED TRIGGER: ICD-10-CM

## 2023-02-27 PROCEDURE — 99214 OFFICE O/P EST MOD 30 MIN: CPT | Performed by: NURSE PRACTITIONER

## 2023-02-27 PROCEDURE — 94726 PLETHYSMOGRAPHY LUNG VOLUMES: CPT | Performed by: INTERNAL MEDICINE

## 2023-02-27 PROCEDURE — 96372 THER/PROPH/DIAG INJ SC/IM: CPT | Performed by: NURSE PRACTITIONER

## 2023-02-27 PROCEDURE — 94060 EVALUATION OF WHEEZING: CPT | Performed by: INTERNAL MEDICINE

## 2023-02-27 PROCEDURE — 94729 DIFFUSING CAPACITY: CPT | Performed by: INTERNAL MEDICINE

## 2023-02-27 RX ORDER — MONTELUKAST SODIUM 10 MG/1
10 TABLET ORAL NIGHTLY
Qty: 30 TABLET | Refills: 5 | Status: SHIPPED | OUTPATIENT
Start: 2023-02-27

## 2023-02-27 RX ORDER — FLUTICASONE FUROATE, UMECLIDINIUM BROMIDE AND VILANTEROL TRIFENATATE 200; 62.5; 25 UG/1; UG/1; UG/1
1 POWDER RESPIRATORY (INHALATION) DAILY
Qty: 28 EACH | Refills: 3 | Status: SHIPPED | OUTPATIENT
Start: 2023-02-27

## 2023-02-27 RX ORDER — METHYLPREDNISOLONE SODIUM SUCCINATE 125 MG/2ML
125 INJECTION, POWDER, LYOPHILIZED, FOR SOLUTION INTRAMUSCULAR; INTRAVENOUS ONCE
Status: COMPLETED | OUTPATIENT
Start: 2023-02-27 | End: 2023-02-27

## 2023-02-27 RX ORDER — AMOXICILLIN AND CLAVULANATE POTASSIUM 875; 125 MG/1; MG/1
1 TABLET, FILM COATED ORAL EVERY 12 HOURS SCHEDULED
COMMUNITY
Start: 2023-02-21

## 2023-02-27 RX ORDER — PREDNISONE 20 MG/1
40 TABLET ORAL DAILY
Qty: 10 TABLET | Refills: 0 | Status: SHIPPED | OUTPATIENT
Start: 2023-02-27

## 2023-02-27 RX ADMIN — METHYLPREDNISOLONE SODIUM SUCCINATE 125 MG: 125 INJECTION, POWDER, LYOPHILIZED, FOR SOLUTION INTRAMUSCULAR; INTRAVENOUS at 14:57

## 2023-04-05 ENCOUNTER — TELEPHONE (OUTPATIENT)
Dept: PHARMACY | Facility: HOSPITAL | Age: 43
End: 2023-04-05
Payer: COMMERCIAL

## 2023-04-19 DIAGNOSIS — J45.50 SEVERE PERSISTENT ASTHMA WITHOUT COMPLICATION: Primary | ICD-10-CM

## 2023-04-20 ENCOUNTER — TELEPHONE (OUTPATIENT)
Dept: PULMONOLOGY | Facility: CLINIC | Age: 43
End: 2023-04-20
Payer: COMMERCIAL

## 2023-04-20 ENCOUNTER — LAB (OUTPATIENT)
Dept: LAB | Facility: HOSPITAL | Age: 43
End: 2023-04-20
Payer: COMMERCIAL

## 2023-04-20 DIAGNOSIS — J45.50 SEVERE PERSISTENT ASTHMA WITHOUT COMPLICATION: ICD-10-CM

## 2023-04-20 LAB
BASOPHILS # BLD AUTO: 0.1 10*3/MM3 (ref 0–0.2)
BASOPHILS NFR BLD AUTO: 0.9 % (ref 0–1.5)
DEPRECATED RDW RBC AUTO: 42.8 FL (ref 37–54)
EOSINOPHIL # BLD AUTO: 0.58 10*3/MM3 (ref 0–0.4)
EOSINOPHIL NFR BLD AUTO: 5.2 % (ref 0.3–6.2)
ERYTHROCYTE [DISTWIDTH] IN BLOOD BY AUTOMATED COUNT: 12.7 % (ref 12.3–15.4)
HCT VFR BLD AUTO: 41.3 % (ref 34–46.6)
HGB BLD-MCNC: 14.1 G/DL (ref 12–15.9)
IMM GRANULOCYTES # BLD AUTO: 0.04 10*3/MM3 (ref 0–0.05)
IMM GRANULOCYTES NFR BLD AUTO: 0.4 % (ref 0–0.5)
LYMPHOCYTES # BLD AUTO: 2.58 10*3/MM3 (ref 0.7–3.1)
LYMPHOCYTES NFR BLD AUTO: 23.1 % (ref 19.6–45.3)
MCH RBC QN AUTO: 31.8 PG (ref 26.6–33)
MCHC RBC AUTO-ENTMCNC: 34.1 G/DL (ref 31.5–35.7)
MCV RBC AUTO: 93 FL (ref 79–97)
MONOCYTES # BLD AUTO: 0.85 10*3/MM3 (ref 0.1–0.9)
MONOCYTES NFR BLD AUTO: 7.6 % (ref 5–12)
NEUTROPHILS NFR BLD AUTO: 62.8 % (ref 42.7–76)
NEUTROPHILS NFR BLD AUTO: 7.03 10*3/MM3 (ref 1.7–7)
NRBC BLD AUTO-RTO: 0 /100 WBC (ref 0–0.2)
PLATELET # BLD AUTO: 347 10*3/MM3 (ref 140–450)
PMV BLD AUTO: 10.5 FL (ref 6–12)
RBC # BLD AUTO: 4.44 10*6/MM3 (ref 3.77–5.28)
WBC NRBC COR # BLD: 11.18 10*3/MM3 (ref 3.4–10.8)

## 2023-04-20 PROCEDURE — 86003 ALLG SPEC IGE CRUDE XTRC EA: CPT

## 2023-04-20 PROCEDURE — 85025 COMPLETE CBC W/AUTO DIFF WBC: CPT

## 2023-04-20 PROCEDURE — 36415 COLL VENOUS BLD VENIPUNCTURE: CPT

## 2023-04-20 PROCEDURE — 82785 ASSAY OF IGE: CPT

## 2023-04-20 NOTE — TELEPHONE ENCOUNTER
Called patient about her recent lab order. Patient is to have her labs drawn today if she is able. Once we get lab work back I will make Sunil aware so we can try to get this patient approved for Dupixent.

## 2023-04-26 LAB
A ALTERNATA IGE QN: <0.1 KU/L
A FUMIGATUS IGE QN: <0.1 KU/L
AMER ROACH IGE QN: <0.1 KU/L
BAHIA GRASS IGE QN: 0.72 KU/L
BERMUDA GRASS IGE QN: 0.44 KU/L
BOXELDER IGE QN: 0.28 KU/L
C HERBARUM IGE QN: <0.1 KU/L
CAT DANDER IGE QN: 12.4 KU/L
CMN PIGWEED IGE QN: <0.1 KU/L
COMMON RAGWEED IGE QN: 16.5 KU/L
CONV CLASS DESCRIPTION: ABNORMAL
D FARINAE IGE QN: 0.57 KU/L
D PTERONYSS IGE QN: 0.93 KU/L
DOG DANDER IGE QN: 1.57 KU/L
ENGL PLANTAIN IGE QN: 0.51 KU/L
HAZELNUT POLN IGE QN: 0.53 KU/L
IGE SERPL-ACNC: 215 IU/ML (ref 6–495)
JOHNSON GRASS IGE QN: 0.44 KU/L
KENT BLUE GRASS IGE QN: 1.4 KU/L
LONDON PLANE IGE QN: 0.11 KU/L
M RACEMOSUS IGE QN: <0.1 KU/L
MT JUNIPER IGE QN: 2.35 KU/L
MUGWORT IGE QN: 0.68 KU/L
NETTLE IGE QN: 0.1 KU/L
P NOTATUM IGE QN: <0.1 KU/L
S BOTRYOSUM IGE QN: <0.1 KU/L
SHEEP SORREL IGE QN: 0.67 KU/L
SWEET GUM IGE QN: 0.22 KU/L
WHITE ELM IGE QN: 0.22 KU/L
WHITE HICKORY IGE QN: 4.36 KU/L
WHITE MULBERRY IGE QN: <0.1 KU/L
WHITE OAK IGE QN: 0.37 KU/L

## 2023-05-12 ENCOUNTER — OFFICE VISIT (OUTPATIENT)
Dept: PULMONOLOGY | Facility: CLINIC | Age: 43
End: 2023-05-12
Payer: COMMERCIAL

## 2023-05-12 VITALS
WEIGHT: 211 LBS | HEIGHT: 65 IN | HEART RATE: 85 BPM | DIASTOLIC BLOOD PRESSURE: 68 MMHG | SYSTOLIC BLOOD PRESSURE: 98 MMHG | OXYGEN SATURATION: 96 % | BODY MASS INDEX: 35.16 KG/M2

## 2023-05-12 DIAGNOSIS — J30.9 ALLERGIC RHINITIS, UNSPECIFIED SEASONALITY, UNSPECIFIED TRIGGER: ICD-10-CM

## 2023-05-12 DIAGNOSIS — R06.02 SHORTNESS OF BREATH: Primary | ICD-10-CM

## 2023-05-12 DIAGNOSIS — J45.50 SEVERE PERSISTENT STEROID-DEPENDENT ASTHMA WITHOUT COMPLICATION: Primary | ICD-10-CM

## 2023-05-12 LAB — EXHALED NITROUS OXIDE: 17

## 2023-05-12 RX ORDER — LEVALBUTEROL INHALATION SOLUTION 1.25 MG/3ML
SOLUTION RESPIRATORY (INHALATION)
COMMUNITY
Start: 2023-03-24

## 2023-05-12 RX ORDER — PREDNISONE 20 MG/1
40 TABLET ORAL DAILY
Qty: 10 TABLET | Refills: 0 | Status: SHIPPED | OUTPATIENT
Start: 2023-05-12

## 2023-05-12 NOTE — PROGRESS NOTES
Follow Up Office Visit      Patient Name: Janell Terrell    Chief Complaint:    Chief Complaint   Patient presents with   • Follow-up   • Shortness of Breath       History of Present Illness: Janell Terrell is a 42 y.o. female who is here today for follow up of severe persistent asthma.  Since last visit, her insurance has denied approval for any asthma Biologic agent.  The patient notes that her symptoms remain uncontrolled despite use of high-dose inhaled corticosteroid.  This has been an ongoing problem for at least 4 years now.  She experiences exertional dyspnea, wheezing, and has a chronic cough.  She does frequently require use of steroids, though they make her irritable and have caused weight gain.    Subjective      Review of Systems:  Review of Systems   Constitutional: Negative for fever and unexpected weight change.   Respiratory: Positive for cough, shortness of breath and wheezing.    Cardiovascular: Negative for chest pain and leg swelling.   Allergic/Immunologic: Positive for environmental allergies.        Past Medical History:   Past Medical History:   Diagnosis Date   • Allergic rhinitis    • Anxiety and depression     situational;  was deployed   • Asthma    • Asthma, extrinsic Childood   • Asthma, intrinsic     Childhood   • Diabetes mellitus     gestational   • GERD (gastroesophageal reflux disease)    • Gestational diabetes    • Hypertension     Had preeclampsia and CHTN since then   • Migraines    • Pneumonia Childhood       Past Surgical History:   Past Surgical History:   Procedure Laterality Date   •  SECTION     •  SECTION WITH TUBAL Bilateral 2018    Procedure:  SECTION REPEAT WITH TUBAL * 37 WKS - GEST. DIABETIC, ELEVATED BPS*;  Surgeon: Terrence Dutta MD;  Location: [facility] LABOR DELIVERY;  Service: Obstetrics/Gynecology   • WISDOM TOOTH EXTRACTION         Family History:   Family History   Problem Relation Age of Onset   •  Breast cancer Mother         Lumpectomy   • Hypothyroidism Mother    • Hypothyroidism Father    • Asthma Sister    • Hypothyroidism Sister    • Hypothyroidism Brother    • Asthma Brother    • Cancer Maternal Grandfather         Lung CA   • Cancer Maternal Grandmother         Lung CA   • Asthma Sister    • Diabetes Sister    • Asthma Brother    • Ovarian cancer Neg Hx        Social History:   Social History     Socioeconomic History   • Marital status:    Tobacco Use   • Smoking status: Never   • Smokeless tobacco: Never   Substance and Sexual Activity   • Alcohol use: Yes     Comment: occasional use when not pregnant   • Drug use: No   • Sexual activity: Yes     Partners: Male     Birth control/protection: Tubal ligation       Current Medications:     Current Outpatient Medications:   •  albuterol (ACCUNEB) 1.25 MG/3ML nebulizer solution, Take 3 mL by nebulization Every 6 (Six) Hours As Needed for Wheezing or Shortness of Air., Disp: 90 mL, Rfl: 0  •  cetirizine (zyrTEC) 10 MG tablet, Take 1 tablet by mouth Daily., Disp: , Rfl:   •  fluconazole (DIFLUCAN) 150 MG tablet, Take 1 tablet by mouth 1 (One) Time., Disp: , Rfl:   •  flunisolide (NASALIDE) 25 MCG/ACT (0.025%) solution nasal spray, Inhale 2 sprays Every 12 (Twelve) Hours., Disp: , Rfl:   •  Fluticasone-Umeclidin-Vilant (Trelegy Ellipta) 200-62.5-25 MCG/ACT aerosol powder , Inhale 1 puff Daily., Disp: 28 each, Rfl: 3  •  levalbuterol (XOPENEX) 1.25 MG/3ML nebulizer solution, INHALE CONTENTS OF 1 AMPULE (3 ML) VIA NEBULIZER EVERY 4 HOURS AS NEEDED, Disp: , Rfl:   •  losartan (COZAAR) 50 MG tablet, Take 1 tablet by mouth Daily., Disp: 60 tablet, Rfl: 0  •  montelukast (SINGULAIR) 10 MG tablet, Take 1 tablet by mouth Every Night., Disp: 30 tablet, Rfl: 5  •  omeprazole (priLOSEC) 40 MG capsule, Take 1 capsule by mouth Daily., Disp: 30 capsule, Rfl: 0  •  Semaglutide, 1 MG/DOSE, (OZEMPIC) 2 MG/1.5ML solution pen-injector, Inject  under the skin into the  "appropriate area as directed 1 (One) Time Per Week., Disp: , Rfl:   •  triamcinolone (KENALOG) 0.1 % cream, Mix with tub of Cetaphil cream and apply from neck to feet twice daily as directed., Disp: , Rfl:       Allergies:   Allergies   Allergen Reactions   • Doxycycline      Chemical esophagitis     • Avelox [Moxifloxacin] Rash   • Zithromax [Azithromycin] Rash       Objective     Physical Exam:  Vital Signs:   Vitals:    05/12/23 1441   BP: 98/68   BP Location: Left arm   Patient Position: Sitting   Cuff Size: Adult   Pulse: 85   SpO2: 96%   Weight: 95.7 kg (211 lb)   Height: 165.1 cm (65\")     Body mass index is 35.11 kg/m².    Physical Exam  Vitals reviewed.   Constitutional:       General: She is not in acute distress.     Appearance: She is not toxic-appearing.   HENT:      Head: Normocephalic and atraumatic.      Nose: Nose normal.      Mouth/Throat:      Mouth: Mucous membranes are moist.   Eyes:      Extraocular Movements: Extraocular movements intact.      Conjunctiva/sclera: Conjunctivae normal.      Pupils: Pupils are equal, round, and reactive to light.   Pulmonary:      Effort: Pulmonary effort is normal.      Breath sounds: Wheezing present.   Abdominal:      General: There is no distension.      Palpations: Abdomen is soft.   Musculoskeletal:         General: No swelling or deformity.      Cervical back: Neck supple.   Skin:     General: Skin is warm and dry.      Findings: No rash.   Neurological:      General: No focal deficit present.      Mental Status: She is alert and oriented to person, place, and time.   Psychiatric:         Mood and Affect: Mood normal.         Behavior: Behavior normal.         Results Review:   WBC   Date Value Ref Range Status   04/20/2023 11.18 (H) 3.40 - 10.80 10*3/mm3 Final     RBC   Date Value Ref Range Status   04/20/2023 4.44 3.77 - 5.28 10*6/mm3 Final     Hemoglobin   Date Value Ref Range Status   04/20/2023 14.1 12.0 - 15.9 g/dL Final     Hematocrit   Date Value " Ref Range Status   04/20/2023 41.3 34.0 - 46.6 % Final     MCV   Date Value Ref Range Status   04/20/2023 93.0 79.0 - 97.0 fL Final     MCH   Date Value Ref Range Status   04/20/2023 31.8 26.6 - 33.0 pg Final     MCHC   Date Value Ref Range Status   04/20/2023 34.1 31.5 - 35.7 g/dL Final     RDW   Date Value Ref Range Status   04/20/2023 12.7 12.3 - 15.4 % Final     RDW-SD   Date Value Ref Range Status   04/20/2023 42.8 37.0 - 54.0 fl Final     MPV   Date Value Ref Range Status   04/20/2023 10.5 6.0 - 12.0 fL Final     Platelets   Date Value Ref Range Status   04/20/2023 347 140 - 450 10*3/mm3 Final     Neutrophil %   Date Value Ref Range Status   04/20/2023 62.8 42.7 - 76.0 % Final     Lymphocyte %   Date Value Ref Range Status   04/20/2023 23.1 19.6 - 45.3 % Final     Monocyte %   Date Value Ref Range Status   04/20/2023 7.6 5.0 - 12.0 % Final     Eosinophil %   Date Value Ref Range Status   04/20/2023 5.2 0.3 - 6.2 % Final     Basophil %   Date Value Ref Range Status   04/20/2023 0.9 0.0 - 1.5 % Final     Immature Grans %   Date Value Ref Range Status   04/20/2023 0.4 0.0 - 0.5 % Final     Neutrophils, Absolute   Date Value Ref Range Status   04/20/2023 7.03 (H) 1.70 - 7.00 10*3/mm3 Final     Lymphocytes, Absolute   Date Value Ref Range Status   04/20/2023 2.58 0.70 - 3.10 10*3/mm3 Final     Monocytes, Absolute   Date Value Ref Range Status   04/20/2023 0.85 0.10 - 0.90 10*3/mm3 Final     Eosinophils, Absolute   Date Value Ref Range Status   04/20/2023 0.58 (H) 0.00 - 0.40 10*3/mm3 Final     Basophils, Absolute   Date Value Ref Range Status   04/20/2023 0.10 0.00 - 0.20 10*3/mm3 Final     Immature Grans, Absolute   Date Value Ref Range Status   04/20/2023 0.04 0.00 - 0.05 10*3/mm3 Final     nRBC   Date Value Ref Range Status   04/20/2023 0.0 0.0 - 0.2 /100 WBC Final     FeNO today 17 ppb    Office spirometry obtained today showed moderate obstruction with significant bronchodilator response.  Postbronchodilator  FEV1 of 73%.    Assessment / Plan      Assessment/Plan:   Diagnoses and all orders for this visit:    1. Severe persistent steroid-dependent asthma without complication (Primary)  -     predniSONE (DELTASONE) 20 MG tablet; Take 2 tablets by mouth Daily.  Dispense: 10 tablet; Refill: 0  -     POCT FENO Test  -     Spirometry With Bronchodilator  -     Dupilumab solution pen-injector 600 mg  Office spirometry obtained today showed worsening lung function.  Patient continues with uncontrolled symptoms despite use of high-dose Trelegy.  Insurance has denied approval for an asthma biologic.  Patient will be administered Dupixent loading dose samples today and will ask the [facility] clinic submit an appeal to her insurance for approval of ongoing Dupixent use.  A prednisone prescription will be sent to the patient's pharmacy for her to have on hand in the event of worsening symptoms. Discussed possible side effects of medications. Risk and benefits of the medication were discussed with patient.     2. Allergic rhinitis, unspecified seasonality, unspecified trigger  Continue Singulair, cetirizine, and flunisolide.    Follow Up:   June 2023  The patient was counseled on diagnostic results, risks and benefits of treatment options, risk factor modifications and the importance of treatment compliance. The patient was advised to contact the clinic with concerns or worsening symptoms.     GEGE Smith   Pulmonary Medicine [place]

## 2023-06-05 ENCOUNTER — SPECIALTY PHARMACY (OUTPATIENT)
Dept: PHARMACY | Facility: HOSPITAL | Age: 43
End: 2023-06-05
Payer: COMMERCIAL

## 2023-06-05 RX ORDER — DUPILUMAB 300 MG/2ML
300 INJECTION, SOLUTION SUBCUTANEOUS
Qty: 4 ML | Refills: 11 | Status: SHIPPED | OUTPATIENT
Start: 2023-06-05

## 2023-06-05 NOTE — PROGRESS NOTES
Ambulatory Care Clinic  Asthma Management     Janell Terrell is a 42 y.o. female referred by pulmonology to the [facility] for severe asthma. The patient's current asthma regimen includes: Trelegy Ellipta, singulair, prn albuterol. Patient is not a smoker or exposed to second hand smoke. Worsening lung function on spirometry 5/12, Dupixent denial approved. Patient has received education and 600 mg loading dose in pulmonology office and has given first maintenance dose to herself at home. Contacted patient via telephone, patient did not have any questions/concerns regarding Dupixent therapy or injection administration.       Past Medical History:   Diagnosis Date    Allergic rhinitis     Anxiety and depression 2006    situational;  was deployed    Asthma     Asthma, extrinsic Childood    Asthma, intrinsic     Childhood    Diabetes mellitus     gestational    GERD (gastroesophageal reflux disease)     Gestational diabetes     Hypertension 2008    Had preeclampsia and CHTN since then    Migraines     Pneumonia Childhood     Social History     Socioeconomic History    Marital status:    Tobacco Use    Smoking status: Never    Smokeless tobacco: Never   Substance and Sexual Activity    Alcohol use: Yes     Comment: occasional use when not pregnant    Drug use: No    Sexual activity: Yes     Partners: Male     Birth control/protection: Tubal ligation     Doxycycline, Avelox [moxifloxacin], and Zithromax [azithromycin]    Current Outpatient Medications:     albuterol (ACCUNEB) 1.25 MG/3ML nebulizer solution, Take 3 mL by nebulization Every 6 (Six) Hours As Needed for Wheezing or Shortness of Air., Disp: 90 mL, Rfl: 0    cetirizine (zyrTEC) 10 MG tablet, Take 1 tablet by mouth Daily., Disp: , Rfl:     Dupilumab (Dupixent) 300 MG/2ML solution pen-injector, Inject 2 mL under the skin into the appropriate area as directed Every 14 (Fourteen) Days. Indications: Asthma, Disp: 4 mL, Rfl: 11     fluconazole (DIFLUCAN) 150 MG tablet, Take 1 tablet by mouth 1 (One) Time., Disp: , Rfl:     flunisolide (NASALIDE) 25 MCG/ACT (0.025%) solution nasal spray, Inhale 2 sprays Every 12 (Twelve) Hours., Disp: , Rfl:     Fluticasone-Umeclidin-Vilant (Trelegy Ellipta) 200-62.5-25 MCG/ACT aerosol powder , Inhale 1 puff Daily., Disp: 28 each, Rfl: 3    levalbuterol (XOPENEX) 1.25 MG/3ML nebulizer solution, INHALE CONTENTS OF 1 AMPULE (3 ML) VIA NEBULIZER EVERY 4 HOURS AS NEEDED, Disp: , Rfl:     losartan (COZAAR) 50 MG tablet, Take 1 tablet by mouth Daily., Disp: 60 tablet, Rfl: 0    montelukast (SINGULAIR) 10 MG tablet, Take 1 tablet by mouth Every Night., Disp: 30 tablet, Rfl: 5    omeprazole (priLOSEC) 40 MG capsule, Take 1 capsule by mouth Daily., Disp: 30 capsule, Rfl: 0    predniSONE (DELTASONE) 20 MG tablet, Take 2 tablets by mouth Daily., Disp: 10 tablet, Rfl: 0    Semaglutide, 1 MG/DOSE, (OZEMPIC) 2 MG/1.5ML solution pen-injector, Inject  under the skin into the appropriate area as directed 1 (One) Time Per Week., Disp: , Rfl:     triamcinolone (KENALOG) 0.1 % cream, Mix with tub of Cetaphil cream and apply from neck to feet twice daily as directed., Disp: , Rfl:   No current facility-administered medications for this visit.      Medication Assessment & Plan:  Patient will continue Dupixent 300 mg SQ Q14 days for severe asthma.   First injection provided in pulmonology clinic and patient was provided step-by-step demonstration of appropriate injection technique at that time. She has given her first maintenance 300 mg SQ injection at home without issue. All questions and concerns addressed via phone. Patient reports they are comfortable administering injections at home.  Advised patient on the importance of continuing maintenance inhaler regimen and the importance of rinsing mouth after ICS use. This injection does not replace your maintenance inhaler and is not used to treat an asthma attack (use a rescue  inhaler).   Patient will continue regular follow-up with pulmonology. [facility] medication. Will follow-up in 6 months, or sooner if needed.     Ashok Vera RPH  6/5/2023  11:02 EDT

## 2023-11-08 ENCOUNTER — TRANSCRIBE ORDERS (OUTPATIENT)
Dept: ADMINISTRATIVE | Facility: HOSPITAL | Age: 43
End: 2023-11-08
Payer: COMMERCIAL

## 2023-11-08 DIAGNOSIS — Z12.31 BREAST CANCER SCREENING BY MAMMOGRAM: Primary | ICD-10-CM

## 2023-11-27 ENCOUNTER — DISEASE STATE MANAGEMENT VISIT (OUTPATIENT)
Dept: PHARMACY | Facility: HOSPITAL | Age: 43
End: 2023-11-27
Payer: COMMERCIAL

## 2023-11-27 VITALS
HEIGHT: 65 IN | DIASTOLIC BLOOD PRESSURE: 76 MMHG | WEIGHT: 191 LBS | BODY MASS INDEX: 31.82 KG/M2 | OXYGEN SATURATION: 96 % | HEART RATE: 71 BPM | SYSTOLIC BLOOD PRESSURE: 126 MMHG

## 2023-11-27 DIAGNOSIS — J45.50 SEVERE PERSISTENT STEROID-DEPENDENT ASTHMA WITHOUT COMPLICATION: Primary | ICD-10-CM

## 2023-11-27 DIAGNOSIS — T88.7XXA MEDICATION SIDE EFFECT: ICD-10-CM

## 2023-11-27 PROCEDURE — G0463 HOSPITAL OUTPT CLINIC VISIT: HCPCS | Performed by: NURSE PRACTITIONER

## 2023-11-27 PROCEDURE — G0463 HOSPITAL OUTPT CLINIC VISIT: HCPCS

## 2023-11-27 RX ORDER — LEVALBUTEROL TARTRATE 45 UG/1
2 AEROSOL, METERED ORAL 4 TIMES DAILY PRN
Qty: 15 G | Refills: 5 | Status: SHIPPED | OUTPATIENT
Start: 2023-11-27

## 2023-11-27 RX ORDER — CEFDINIR 300 MG/1
CAPSULE ORAL
COMMUNITY
Start: 2023-11-16

## 2023-11-27 RX ORDER — PROCHLORPERAZINE MALEATE 10 MG
1 TABLET ORAL 3 TIMES DAILY
COMMUNITY
Start: 2023-11-16

## 2023-11-27 RX ORDER — DEXAMETHASONE 1 MG
TABLET ORAL
COMMUNITY
Start: 2023-11-16

## 2023-11-27 RX ORDER — CLONAZEPAM 0.5 MG/1
1 TABLET ORAL DAILY
COMMUNITY
Start: 2023-11-16

## 2023-11-27 RX ORDER — LEVALBUTEROL INHALATION SOLUTION 1.25 MG/3ML
3 SOLUTION RESPIRATORY (INHALATION) 4 TIMES DAILY PRN
Qty: 120 ML | Refills: 5 | Status: SHIPPED | OUTPATIENT
Start: 2023-11-27

## 2023-11-27 NOTE — PROGRESS NOTES
Follow Up Office Visit      Patient Name: Janell Terrell    Chief Complaint:  Asthma, Dupixent side effects      History of Present Illness: Janell Terrell is a 43 y.o. female who is here today for follow up of severe persistent asthma. Since last visit, she has continued on Dupixent though struggles with significant joint pain for ~4 days after taking her injection. Earlier this month she visited her PCP and was treated for an exacerbation and was prescribed ongoing use of dexamethasone. She reports having labs drawn on that day prior to receiving a steroid injection and her eosinophil count was >300. She notes that she is breathing better and her joint pain in improved since being on steroids. However, she does not tolerate systemic steroid use very well due to weight gain and insomnia with resultant headaches. She continues on high-dose Trelegy. She was on allergy shots in the past. She avoids albuterol use because it makes her feel jittery. Tolerates Xopenex better.    Subjective      Review of Systems:  Review of Systems   Constitutional:  Negative for fever.   Respiratory:  Positive for cough, shortness of breath and wheezing.    Cardiovascular:  Negative for chest pain.   Allergic/Immunologic: Positive for environmental allergies.        Past Medical History:   Past Medical History:   Diagnosis Date    Allergic rhinitis     Anxiety and depression 2006    situational;  was deployed    Asthma     Asthma, extrinsic Childood    Asthma, intrinsic     Childhood    Diabetes mellitus     gestational    GERD (gastroesophageal reflux disease)     Gestational diabetes     Hypertension     Had preeclampsia and CHTN since then    Migraines     Pneumonia Childhood       Past Surgical History:   Past Surgical History:   Procedure Laterality Date     SECTION       SECTION WITH TUBAL Bilateral 2018    Procedure:  SECTION REPEAT WITH TUBAL * 37 WKS - GEST. DIABETIC, ELEVATED  BPS*;  Surgeon: Terrence Dutta MD;  Location: [facility] LABOR DELIVERY;  Service: Obstetrics/Gynecology    WISDOM TOOTH EXTRACTION  1998       Family History:   Family History   Problem Relation Age of Onset    Breast cancer Mother         Lumpectomy    Hypothyroidism Mother     Hypothyroidism Father     Asthma Sister     Hypothyroidism Sister     Hypothyroidism Brother     Asthma Brother     Cancer Maternal Grandfather         Lung CA    Cancer Maternal Grandmother         Lung CA    Asthma Sister     Diabetes Sister     Asthma Brother     Ovarian cancer Neg Hx        Social History:   Social History     Socioeconomic History    Marital status:    Tobacco Use    Smoking status: Never     Passive exposure: Never    Smokeless tobacco: Never   Vaping Use    Vaping Use: Never used   Substance and Sexual Activity    Alcohol use: Yes     Comment: occasional use when not pregnant    Drug use: No    Sexual activity: Yes     Partners: Male     Birth control/protection: Tubal ligation       Current Medications:     Current Outpatient Medications:     albuterol (ACCUNEB) 1.25 MG/3ML nebulizer solution, Take 3 mL by nebulization Every 6 (Six) Hours As Needed for Wheezing or Shortness of Air., Disp: 90 mL, Rfl: 0    cefdinir (OMNICEF) 300 MG capsule, TAKE 1 CAPSULE BY MOUTH 2 TIMES A DAY UNTIL GONE, Disp: , Rfl:     cetirizine (zyrTEC) 10 MG tablet, Take 1 tablet by mouth Daily., Disp: , Rfl:     clonazePAM (KlonoPIN) 0.5 MG tablet, Take 1 tablet by mouth Daily., Disp: , Rfl:     dexAMETHasone (DECADRON) 1 MG tablet, TAKE 6 TABLETS BY MOUTH ON DAY 1 THEN DECREASE BY 1 TABLET EVERY DAY UNTIL ALL ARE  take with food may divide doses, Disp: , Rfl:     Dupilumab (Dupixent) 300 MG/2ML solution pen-injector, Inject 2 mL under the skin into the appropriate area as directed Every 14 (Fourteen) Days. Indications: Asthma, Disp: 4 mL, Rfl: 11    fluconazole (DIFLUCAN) 150 MG tablet, Take 1 tablet by mouth 1 (One) Time., Disp: ,  "Rfl:     flunisolide (NASALIDE) 25 MCG/ACT (0.025%) solution nasal spray, Inhale 2 sprays Every 12 (Twelve) Hours., Disp: , Rfl:     Fluticasone-Umeclidin-Vilant (Trelegy Ellipta) 200-62.5-25 MCG/ACT aerosol powder , Inhale 1 puff Daily., Disp: 1 each, Rfl: 5    levalbuterol (XOPENEX) 1.25 MG/3ML nebulizer solution, INHALE CONTENTS OF 1 AMPULE (3 ML) VIA NEBULIZER EVERY 4 HOURS AS NEEDED, Disp: , Rfl:     losartan (COZAAR) 50 MG tablet, Take 1 tablet by mouth Daily., Disp: 60 tablet, Rfl: 0    montelukast (SINGULAIR) 10 MG tablet, Take 1 tablet by mouth Every Night., Disp: 30 tablet, Rfl: 5    omeprazole (priLOSEC) 40 MG capsule, Take 1 capsule by mouth Daily., Disp: 30 capsule, Rfl: 0    prochlorperazine (COMPAZINE) 10 MG tablet, Take 1 tablet by mouth 3 times a day., Disp: , Rfl:     Semaglutide, 1 MG/DOSE, (OZEMPIC) 2 MG/1.5ML solution pen-injector, Inject  under the skin into the appropriate area as directed 1 (One) Time Per Week., Disp: , Rfl:     sertraline (ZOLOFT) 50 MG tablet, Take 1 tablet by mouth Daily., Disp: , Rfl:      Allergies:   Allergies   Allergen Reactions    Doxycycline      Chemical esophagitis      Avelox [Moxifloxacin] Rash    Zithromax [Azithromycin] Rash       Objective     Physical Exam:  Vital Signs:   Vitals:    11/27/23 1002   BP: 126/76   Pulse: 71   SpO2: 96%   Weight: 86.6 kg (191 lb)   Height: 165.1 cm (65\")     Body mass index is 31.78 kg/m².    Physical Exam  Vitals reviewed.   Constitutional:       General: She is not in acute distress.     Appearance: She is not toxic-appearing.   HENT:      Head: Normocephalic and atraumatic.      Mouth/Throat:      Mouth: Mucous membranes are moist.   Eyes:      Conjunctiva/sclera: Conjunctivae normal.   Cardiovascular:      Rate and Rhythm: Normal rate.      Heart sounds: Normal heart sounds.   Pulmonary:      Effort: Pulmonary effort is normal.      Breath sounds: Wheezing present.   Abdominal:      General: There is no distension.      " Palpations: Abdomen is soft.   Musculoskeletal:         General: No deformity.      Cervical back: Neck supple.   Skin:     General: Skin is warm and dry.      Findings: No rash.   Neurological:      General: No focal deficit present.      Mental Status: She is alert and oriented to person, place, and time.   Psychiatric:         Mood and Affect: Mood normal.         Behavior: Behavior normal.     Results Review:   WBC   Date Value Ref Range Status   04/20/2023 11.18 (H) 3.40 - 10.80 10*3/mm3 Final     RBC   Date Value Ref Range Status   04/20/2023 4.44 3.77 - 5.28 10*6/mm3 Final     Hemoglobin   Date Value Ref Range Status   04/20/2023 14.1 12.0 - 15.9 g/dL Final     Hematocrit   Date Value Ref Range Status   04/20/2023 41.3 34.0 - 46.6 % Final     MCV   Date Value Ref Range Status   04/20/2023 93.0 79.0 - 97.0 fL Final     MCH   Date Value Ref Range Status   04/20/2023 31.8 26.6 - 33.0 pg Final     MCHC   Date Value Ref Range Status   04/20/2023 34.1 31.5 - 35.7 g/dL Final     RDW   Date Value Ref Range Status   04/20/2023 12.7 12.3 - 15.4 % Final     RDW-SD   Date Value Ref Range Status   04/20/2023 42.8 37.0 - 54.0 fl Final     MPV   Date Value Ref Range Status   04/20/2023 10.5 6.0 - 12.0 fL Final     Platelets   Date Value Ref Range Status   04/20/2023 347 140 - 450 10*3/mm3 Final     Neutrophil %   Date Value Ref Range Status   04/20/2023 62.8 42.7 - 76.0 % Final     Lymphocyte %   Date Value Ref Range Status   04/20/2023 23.1 19.6 - 45.3 % Final     Monocyte %   Date Value Ref Range Status   04/20/2023 7.6 5.0 - 12.0 % Final     Eosinophil %   Date Value Ref Range Status   04/20/2023 5.2 0.3 - 6.2 % Final     Basophil %   Date Value Ref Range Status   04/20/2023 0.9 0.0 - 1.5 % Final     Immature Grans %   Date Value Ref Range Status   04/20/2023 0.4 0.0 - 0.5 % Final     Neutrophils, Absolute   Date Value Ref Range Status   04/20/2023 7.03 (H) 1.70 - 7.00 10*3/mm3 Final     Lymphocytes, Absolute   Date  Value Ref Range Status   04/20/2023 2.58 0.70 - 3.10 10*3/mm3 Final     Monocytes, Absolute   Date Value Ref Range Status   04/20/2023 0.85 0.10 - 0.90 10*3/mm3 Final     Eosinophils, Absolute   Date Value Ref Range Status   04/20/2023 0.58 (H) 0.00 - 0.40 10*3/mm3 Final     Basophils, Absolute   Date Value Ref Range Status   04/20/2023 0.10 0.00 - 0.20 10*3/mm3 Final     Immature Grans, Absolute   Date Value Ref Range Status   04/20/2023 0.04 0.00 - 0.05 10*3/mm3 Final     nRBC   Date Value Ref Range Status   04/20/2023 0.0 0.0 - 0.2 /100 WBC Final     Assessment / Plan      Assessment/Plan:   Diagnoses and all orders for this visit:    1. Severe persistent steroid-dependent asthma without complication (Primary)  -     ipratropium (ATROVENT HFA) 17 MCG/ACT inhaler; Inhale 2 puffs 4 (Four) Times a Day As Needed for Wheezing (shortness of breath).  Dispense: 12.9 g; Refill: 5  -     levalbuterol (XOPENEX HFA) 45 MCG/ACT inhaler; Inhale 2 puffs 4 (Four) Times a Day As Needed for Wheezing or Shortness of Air.  Dispense: 15 g; Refill: 5  -     levalbuterol (XOPENEX) 1.25 MG/3ML nebulizer solution; Take 1 ampule by nebulization 4 (Four) Times a Day As Needed for Wheezing or Shortness of Air.  Dispense: 120 mL; Refill: 5  -     ipratropium (ATROVENT) 0.02 % nebulizer solution; Take 2.5 mL by nebulization 4 (Four) Times a Day As Needed for Wheezing or Shortness of Air.  Dispense: 2.5 mL; Refill: 5  -     mepolizumab (NUCALA) 100 MG reconstituted solution injection; Inject 100 mg under the skin into the appropriate area as directed Every 28 (Twenty-Eight) Days.  Dispense: 3 each; Refill: 3  Stop use of Dupixent and will transition to Nucala.  Request recent CBC result from her PCP for review. After consultation with the [facility] clinic pharmacist, the patient was advised to begin Nucala on the day that her next Dupixent dose would be due. She will then begin weaning off of dexamethasone over a 2 week period (1/2 tablet per day  x1 week and then 1/2 tablet qod x1 week then stop). Use Xopenx and Atrovent PRN.    2. Medication side effect  Joint pain secondary to Dupixent use. D/c Dupixent as noted above. Joint pain is currently resolved. Patient was advised to seek further work up by her PCP or a rheumatologist if joint pain recurs.       Follow Up:   Return in about 2 months (around 1/27/2024) for Recheck.  The patient was counseled on diagnostic results, risks and benefits of treatment options, risk factor modifications and the importance of treatment compliance. The patient was advised to contact the clinic with concerns or worsening symptoms.     GEGE Smith   Pulmonary Medicine [place]

## 2023-12-11 RX ORDER — EPINEPHRINE 0.3 MG/.3ML
0.3 INJECTION SUBCUTANEOUS ONCE
Qty: 1 EACH | Refills: 0 | Status: SHIPPED | OUTPATIENT
Start: 2023-12-11 | End: 2023-12-11

## 2023-12-14 ENCOUNTER — TELEPHONE (OUTPATIENT)
Dept: PHARMACY | Facility: HOSPITAL | Age: 43
End: 2023-12-14
Payer: COMMERCIAL

## 2023-12-14 DIAGNOSIS — J45.50 SEVERE PERSISTENT STEROID-DEPENDENT ASTHMA WITHOUT COMPLICATION: Primary | ICD-10-CM

## 2023-12-14 RX ORDER — MEPOLIZUMAB 100 MG/ML
1 INJECTION, SOLUTION SUBCUTANEOUS
Qty: 3 ML | Refills: 3 | Status: SHIPPED | OUTPATIENT
Start: 2023-12-14

## 2023-12-14 NOTE — TELEPHONE ENCOUNTER
Patient called stating that [facility] can not send her the Nucala due to it being ordered as the reconstituted vial instead of the auto injector.   I have attached the order for the auto injector.

## 2023-12-18 ENCOUNTER — TELEPHONE (OUTPATIENT)
Dept: PHARMACY | Facility: HOSPITAL | Age: 43
End: 2023-12-18
Payer: COMMERCIAL

## 2023-12-18 NOTE — TELEPHONE ENCOUNTER
Patient called and asked for a prescription for her Shingles vaccine to be sent in to her pharmacy, [facility] in [place], because they require one due to her age.    Please advise.    ZAC Barrett

## 2023-12-27 RX ORDER — ZOSTER VACCINE RECOMBINANT, ADJUVANTED 50 MCG/0.5
0.5 KIT INTRAMUSCULAR ONCE
Qty: 1 EACH | Refills: 0 | Status: SHIPPED | OUTPATIENT
Start: 2023-12-27 | End: 2023-12-27

## 2024-01-03 ENCOUNTER — OFFICE VISIT (OUTPATIENT)
Dept: OBSTETRICS AND GYNECOLOGY | Facility: CLINIC | Age: 44
End: 2024-01-03
Payer: COMMERCIAL

## 2024-01-03 VITALS
HEIGHT: 65 IN | BODY MASS INDEX: 31.72 KG/M2 | SYSTOLIC BLOOD PRESSURE: 134 MMHG | DIASTOLIC BLOOD PRESSURE: 72 MMHG | WEIGHT: 190.4 LBS

## 2024-01-03 DIAGNOSIS — Z01.419 WOMEN'S ANNUAL ROUTINE GYNECOLOGICAL EXAMINATION: Primary | ICD-10-CM

## 2024-01-03 DIAGNOSIS — Z12.39 ENCOUNTER FOR BREAST CANCER SCREENING USING NON-MAMMOGRAM MODALITY: ICD-10-CM

## 2024-01-03 DIAGNOSIS — N94.6 DYSMENORRHEA: ICD-10-CM

## 2024-01-03 DIAGNOSIS — N93.9 ABNORMAL UTERINE BLEEDING (AUB): ICD-10-CM

## 2024-01-03 RX ORDER — EPINEPHRINE 0.3 MG/.3ML
INJECTION SUBCUTANEOUS
COMMUNITY
Start: 2023-12-11

## 2024-01-03 RX ORDER — IMIQUIMOD 12.5 MG/.25G
1 CREAM TOPICAL 3 TIMES WEEKLY
Qty: 12 EACH | Refills: 2 | Status: SHIPPED | OUTPATIENT
Start: 2024-01-03

## 2024-01-03 NOTE — PROGRESS NOTES
Gynecologic Annual Exam Note          GYN Annual Exam     Gynecologic Exam (annual)        Subjective     HPI  Janell Terrell is a 43 y.o. female, , who presents for annual well woman exam as a established patient . Patient's last menstrual period was 12/15/2023 (approximate)..  Patient reports problems with:  occasional hot flashes . Patient states she has been taking Estroven and it has helped. Patient also reports a bump on her vulva. Her periods occur every 25-35 days , lasting 5-7 days. The flow is moderate to heavy. Patient saturating a pad/tampon every 1 hour on the second day of her period. This heavy bleeding lasts for 1 days of her period. She reports dysmenorrhea is severe, occurring first 1-2 days of flow. Partner Status: Marital Status: . She is is sexually active. She has not had new partners.. STD testing recommendations have been explained to the patient and she does not desire STD testing. Since her last visit she has started on Nucala for Asthma .       Additional OB/GYN History   Current contraception: contraceptive methods: Tubal ligation  Desires to: continue contraception    Last Pap : 21. Result: negative. HPV: negative.   Last Completed Pap Smear            PAP SMEAR (Every 3 Years) Next due on 2021  SCANNED - PAP SMEAR    2018  Done - regardless; negative                  History of abnormal Pap smear: no  Family history of uterine, colon, breast, or ovarian cancer: yes - Breast Ca- Mother (Possibly Uterine Ca as well)  Performs monthly Self-Breast Exam: no  Last mammogram: 22. Done at . Scheduled next week.    Last Completed Mammogram       This patient has no relevant Health Maintenance data.            History of abnormal mammogram: no    Colonoscopy: has never had a colonoscopy.  Exercises Regularly: no  Feelings of Anxiety or Depression: no  Tobacco Usage?: No       Current Outpatient Medications:     albuterol (ACCUNEB) 1.25  MG/3ML nebulizer solution, Take 3 mL by nebulization Every 6 (Six) Hours As Needed for Wheezing or Shortness of Air., Disp: 90 mL, Rfl: 0    cetirizine (zyrTEC) 10 MG tablet, Take 1 tablet by mouth Daily., Disp: , Rfl:     clonazePAM (KlonoPIN) 0.5 MG tablet, Take 1 tablet by mouth As Needed., Disp: , Rfl:     dexAMETHasone (DECADRON) 1 MG tablet, TAKE 6 TABLETS BY MOUTH ON DAY 1 THEN DECREASE BY 1 TABLET EVERY DAY UNTIL ALL ARE  take with food may divide doses, Disp: , Rfl:     EPINEPHrine (EPIPEN) 0.3 MG/0.3ML solution auto-injector injection, INJECT SYRINGE INTO THE APPROPRIATE MUSCLE AS DIRECTED FOR ALLERGIC REACTION, Disp: , Rfl:     flunisolide (NASALIDE) 25 MCG/ACT (0.025%) solution nasal spray, Inhale 2 sprays Every 12 (Twelve) Hours., Disp: , Rfl:     Fluticasone-Umeclidin-Vilant (Trelegy Ellipta) 200-62.5-25 MCG/ACT aerosol powder , Inhale 1 puff Daily., Disp: 1 each, Rfl: 5    ipratropium (ATROVENT) 0.02 % nebulizer solution, Take 2.5 mL by nebulization 4 (Four) Times a Day As Needed for Wheezing or Shortness of Air., Disp: 2.5 mL, Rfl: 5    levalbuterol (XOPENEX HFA) 45 MCG/ACT inhaler, Inhale 2 puffs 4 (Four) Times a Day As Needed for Wheezing or Shortness of Air., Disp: 15 g, Rfl: 5    levalbuterol (XOPENEX) 1.25 MG/3ML nebulizer solution, Take 1 ampule by nebulization 4 (Four) Times a Day As Needed for Wheezing or Shortness of Air., Disp: 120 mL, Rfl: 5    losartan (COZAAR) 50 MG tablet, Take 1 tablet by mouth Daily., Disp: 60 tablet, Rfl: 0    Mepolizumab (Nucala) 100 MG/ML solution auto-injector, Inject 1 mL under the skin into the appropriate area as directed Every 28 (Twenty-Eight) Days., Disp: 3 mL, Rfl: 3    montelukast (SINGULAIR) 10 MG tablet, Take 1 tablet by mouth Every Night., Disp: 30 tablet, Rfl: 5    omeprazole (priLOSEC) 40 MG capsule, Take 1 capsule by mouth Daily., Disp: 30 capsule, Rfl: 0    prochlorperazine (COMPAZINE) 10 MG tablet, Take 1 tablet by mouth 3 times a day. As needed,  Disp: , Rfl:     Semaglutide, 1 MG/DOSE, (OZEMPIC) 2 MG/1.5ML solution pen-injector, Inject  under the skin into the appropriate area as directed 1 (One) Time Per Week., Disp: , Rfl:     sertraline (ZOLOFT) 50 MG tablet, Take 1 tablet by mouth Daily., Disp: , Rfl:     ipratropium (ATROVENT HFA) 17 MCG/ACT inhaler, Inhale 2 puffs 4 (Four) Times a Day As Needed for Wheezing (shortness of breath)., Disp: 12.9 g, Rfl: 5    levalbuterol (XOPENEX) 1.25 MG/3ML nebulizer solution, INHALE CONTENTS OF 1 AMPULE (3 ML) VIA NEBULIZER EVERY 4 HOURS AS NEEDED (Patient not taking: Reported on 1/3/2024), Disp: , Rfl:      Patient denies the need for medication refills today.    OB History          2    Para   2    Term   1       1    AB   0    Living   2         SAB   0    IAB   0    Ectopic   0    Molar   0    Multiple   0    Live Births   2                Past Medical History:   Diagnosis Date    Allergic rhinitis     Anxiety and depression     situational;  was deployed    Asthma     Asthma, extrinsic Childood    Asthma, intrinsic     Childhood    Diabetes mellitus     gestational    GERD (gastroesophageal reflux disease)     Gestational diabetes     Hypertension     Had preeclampsia and CHTN since then    Migraines     Pneumonia Childhood        Past Surgical History:   Procedure Laterality Date     SECTION       SECTION WITH TUBAL Bilateral 2018    Procedure:  SECTION REPEAT WITH TUBAL * 37 WKS - GEST. DIABETIC, ELEVATED BPS*;  Surgeon: Terrence Dutta MD;  [facility];  Service: Obstetrics/Gynecology    WISDOM TOOTH EXTRACTION         Health Maintenance   Topic Date Due    Pneumococcal Vaccine 0-64 (1 of 2 - PCV) Never done    HEPATITIS C SCREENING  Never done    ANNUAL PHYSICAL  Never done    TDAP/TD VACCINES (2 - Td or Tdap) 2019    Annual Gynecologic Pelvic and Breast Exam  2022    MAMMOGRAM  2023    COVID-19 Vaccine  "(5 - 2023-24 season) 09/01/2023    BMI FOLLOWUP  02/27/2024    PAP SMEAR  07/16/2024    Hepatitis B  Completed    INFLUENZA VACCINE  Completed       The additional following portions of the patient's history were reviewed and updated as appropriate: allergies, current medications, past family history, past medical history, past social history, past surgical history, and problem list.    Review of Systems   Constitutional: Negative.    HENT: Negative.     Eyes: Negative.    Respiratory: Negative.     Cardiovascular: Negative.    Gastrointestinal: Negative.    Endocrine: Negative.  Heat intolerance: hot flashes.   Genitourinary:         Vulvar bump   Musculoskeletal: Negative.    Skin: Negative.    Allergic/Immunologic: Negative.    Neurological: Negative.    Hematological: Negative.    Psychiatric/Behavioral: Negative.           I have reviewed and agree with the HPI, ROS, and historical information as entered above. Heike Bailey MD          Objective   /72   Ht 165.1 cm (65\")   Wt 86.4 kg (190 lb 6.4 oz)   LMP 12/15/2023 (Approximate)   BMI 31.68 kg/m²     Physical Exam  Vitals and nursing note reviewed. Exam conducted with a chaperone present.   Constitutional:       Appearance: She is well-developed.   HENT:      Head: Normocephalic and atraumatic.   Neck:      Thyroid: No thyroid mass or thyromegaly.   Cardiovascular:      Rate and Rhythm: Normal rate and regular rhythm.      Heart sounds: No murmur heard.  Pulmonary:      Effort: Pulmonary effort is normal. No retractions.      Breath sounds: Normal breath sounds. No wheezing, rhonchi or rales.   Chest:      Chest wall: No mass or tenderness.   Breasts:     Right: Normal. No mass, nipple discharge, skin change or tenderness.      Left: Normal. No mass, nipple discharge, skin change or tenderness.   Abdominal:      General: Bowel sounds are normal.      Palpations: Abdomen is soft. Abdomen is not rigid. There is no mass.      Tenderness: There is no " abdominal tenderness. There is no guarding.      Hernia: No hernia is present. There is no hernia in the left inguinal area.   Genitourinary:     Labia:         Right: No rash, tenderness or lesion.         Left: No rash, tenderness or lesion.       Vagina: Normal. No vaginal discharge or lesions.      Cervix: No cervical motion tenderness, discharge, lesion or cervical bleeding.      Uterus: Normal. Not enlarged, not fixed and not tender.       Adnexa:         Right: No mass or tenderness.          Left: No mass or tenderness.        Rectum: No external hemorrhoid.      Comments: Small suspected condyloma mons/upper mid labia  Musculoskeletal:      Cervical back: Normal range of motion. No muscular tenderness.   Neurological:      Mental Status: She is alert and oriented to person, place, and time.   Psychiatric:         Behavior: Behavior normal.            Assessment and Plan    Encounter Diagnoses   Name Primary?    Women's annual routine gynecological examination Yes    Abnormal uterine bleeding (AUB)     Dysmenorrhea     Encounter for breast cancer screening using non-mammogram modality        GYN annual well woman exam.   Pap guidelines reviewed.  Reviewed monthly self breast exams.  Instructed to call with lumps, pain, or breast discharge.    Ordered Mammogram today  Recommended use of Vitamin D replacement and getting adequate calcium in her diet. (1500mg)  Painful heavy periods - will return for u/s and endo bx.  She is leaning toward Novasure endometrial ablation.  Will need to check  scars.  HPV lesion - Rx Aldara. Unsure without biopsy if mole or wart.    Heike Bailey MD  2024

## 2024-01-15 DIAGNOSIS — J45.50 SEVERE PERSISTENT ASTHMA WITHOUT COMPLICATION: ICD-10-CM

## 2024-01-15 RX ORDER — FLUTICASONE FUROATE, UMECLIDINIUM BROMIDE AND VILANTEROL TRIFENATATE 200; 62.5; 25 UG/1; UG/1; UG/1
1 POWDER RESPIRATORY (INHALATION) DAILY
Qty: 60 EACH | Refills: 0 | Status: SHIPPED | OUTPATIENT
Start: 2024-01-15

## 2024-02-05 ENCOUNTER — APPOINTMENT (OUTPATIENT)
Dept: CT IMAGING | Facility: HOSPITAL | Age: 44
End: 2024-02-05
Payer: COMMERCIAL

## 2024-02-05 ENCOUNTER — HOSPITAL ENCOUNTER (EMERGENCY)
Facility: HOSPITAL | Age: 44
Discharge: HOME | End: 2024-02-05
Attending: EMERGENCY MEDICINE | Admitting: EMERGENCY MEDICINE
Payer: COMMERCIAL

## 2024-02-05 VITALS
HEART RATE: 70 BPM | HEIGHT: 65 IN | TEMPERATURE: 98.3 F | SYSTOLIC BLOOD PRESSURE: 122 MMHG | RESPIRATION RATE: 16 BRPM | DIASTOLIC BLOOD PRESSURE: 75 MMHG | WEIGHT: 180 LBS | BODY MASS INDEX: 29.99 KG/M2 | OXYGEN SATURATION: 99 %

## 2024-02-05 DIAGNOSIS — R10.9 FLANK PAIN: Primary | ICD-10-CM

## 2024-02-05 LAB
ALBUMIN SERPL-MCNC: 4.3 G/DL (ref 3.5–5.2)
ALBUMIN/GLOB SERPL: 1.4 G/DL
ALP SERPL-CCNC: 37 U/L (ref 39–117)
ALT SERPL W P-5'-P-CCNC: 23 U/L (ref 1–33)
ANION GAP SERPL CALCULATED.3IONS-SCNC: 10 MMOL/L (ref 5–15)
AST SERPL-CCNC: 18 U/L (ref 1–32)
B-HCG UR QL: NEGATIVE
BASOPHILS # BLD AUTO: 0.07 10*3/MM3 (ref 0–0.2)
BASOPHILS NFR BLD AUTO: 0.7 % (ref 0–1.5)
BILIRUB SERPL-MCNC: 0.3 MG/DL (ref 0–1.2)
BILIRUB UR QL STRIP: NEGATIVE
BUN SERPL-MCNC: 13 MG/DL (ref 6–20)
BUN/CREAT SERPL: 16.5 (ref 7–25)
CALCIUM SPEC-SCNC: 9.2 MG/DL (ref 8.6–10.5)
CHLORIDE SERPL-SCNC: 101 MMOL/L (ref 98–107)
CLARITY UR: CLEAR
CO2 SERPL-SCNC: 24 MMOL/L (ref 22–29)
COLOR UR: YELLOW
CREAT SERPL-MCNC: 0.79 MG/DL (ref 0.57–1)
DEPRECATED RDW RBC AUTO: 42.8 FL (ref 37–54)
EGFRCR SERPLBLD CKD-EPI 2021: 95.9 ML/MIN/1.73
EOSINOPHIL # BLD AUTO: 0.21 10*3/MM3 (ref 0–0.4)
EOSINOPHIL NFR BLD AUTO: 2.2 % (ref 0.3–6.2)
ERYTHROCYTE [DISTWIDTH] IN BLOOD BY AUTOMATED COUNT: 12 % (ref 12.3–15.4)
EXPIRATION DATE: NORMAL
GLOBULIN UR ELPH-MCNC: 3 GM/DL
GLUCOSE SERPL-MCNC: 90 MG/DL (ref 65–99)
GLUCOSE UR STRIP-MCNC: NEGATIVE MG/DL
HCT VFR BLD AUTO: 43.7 % (ref 34–46.6)
HGB BLD-MCNC: 14.6 G/DL (ref 12–15.9)
HGB UR QL STRIP.AUTO: NEGATIVE
HOLD SPECIMEN: NORMAL
IMM GRANULOCYTES # BLD AUTO: 0.07 10*3/MM3 (ref 0–0.05)
IMM GRANULOCYTES NFR BLD AUTO: 0.7 % (ref 0–0.5)
INTERNAL NEGATIVE CONTROL: NEGATIVE
INTERNAL POSITIVE CONTROL: POSITIVE
KETONES UR QL STRIP: NEGATIVE
LEUKOCYTE ESTERASE UR QL STRIP.AUTO: NEGATIVE
LIPASE SERPL-CCNC: 31 U/L (ref 13–60)
LYMPHOCYTES # BLD AUTO: 2.1 10*3/MM3 (ref 0.7–3.1)
LYMPHOCYTES NFR BLD AUTO: 22 % (ref 19.6–45.3)
Lab: NORMAL
MCH RBC QN AUTO: 32.6 PG (ref 26.6–33)
MCHC RBC AUTO-ENTMCNC: 33.4 G/DL (ref 31.5–35.7)
MCV RBC AUTO: 97.5 FL (ref 79–97)
MONOCYTES # BLD AUTO: 0.79 10*3/MM3 (ref 0.1–0.9)
MONOCYTES NFR BLD AUTO: 8.3 % (ref 5–12)
NEUTROPHILS NFR BLD AUTO: 6.29 10*3/MM3 (ref 1.7–7)
NEUTROPHILS NFR BLD AUTO: 66.1 % (ref 42.7–76)
NITRITE UR QL STRIP: NEGATIVE
NRBC BLD AUTO-RTO: 0 /100 WBC (ref 0–0.2)
PH UR STRIP.AUTO: 5.5 [PH] (ref 5–8)
PLATELET # BLD AUTO: 349 10*3/MM3 (ref 140–450)
PMV BLD AUTO: 9.8 FL (ref 6–12)
POTASSIUM SERPL-SCNC: 4 MMOL/L (ref 3.5–5.2)
PROT SERPL-MCNC: 7.3 G/DL (ref 6–8.5)
PROT UR QL STRIP: NEGATIVE
RBC # BLD AUTO: 4.48 10*6/MM3 (ref 3.77–5.28)
SODIUM SERPL-SCNC: 135 MMOL/L (ref 136–145)
SP GR UR STRIP: 1.02 (ref 1–1.03)
UROBILINOGEN UR QL STRIP: NORMAL
WBC NRBC COR # BLD AUTO: 9.53 10*3/MM3 (ref 3.4–10.8)
WHOLE BLOOD HOLD COAG: NORMAL
WHOLE BLOOD HOLD SPECIMEN: NORMAL

## 2024-02-05 PROCEDURE — 74177 CT ABD & PELVIS W/CONTRAST: CPT

## 2024-02-05 PROCEDURE — 25010000002 KETOROLAC TROMETHAMINE PER 15 MG

## 2024-02-05 PROCEDURE — 25010000002 ONDANSETRON PER 1 MG

## 2024-02-05 PROCEDURE — 25810000003 SODIUM CHLORIDE 0.9 % SOLUTION

## 2024-02-05 PROCEDURE — 81025 URINE PREGNANCY TEST: CPT | Performed by: EMERGENCY MEDICINE

## 2024-02-05 PROCEDURE — 96375 TX/PRO/DX INJ NEW DRUG ADDON: CPT

## 2024-02-05 PROCEDURE — 85025 COMPLETE CBC W/AUTO DIFF WBC: CPT | Performed by: EMERGENCY MEDICINE

## 2024-02-05 PROCEDURE — 80053 COMPREHEN METABOLIC PANEL: CPT | Performed by: EMERGENCY MEDICINE

## 2024-02-05 PROCEDURE — 96374 THER/PROPH/DIAG INJ IV PUSH: CPT

## 2024-02-05 PROCEDURE — 81003 URINALYSIS AUTO W/O SCOPE: CPT | Performed by: EMERGENCY MEDICINE

## 2024-02-05 PROCEDURE — 83690 ASSAY OF LIPASE: CPT | Performed by: EMERGENCY MEDICINE

## 2024-02-05 PROCEDURE — 25510000001 IOPAMIDOL 61 % SOLUTION: Performed by: EMERGENCY MEDICINE

## 2024-02-05 PROCEDURE — 99285 EMERGENCY DEPT VISIT HI MDM: CPT

## 2024-02-05 RX ORDER — SODIUM CHLORIDE 9 MG/ML
10 INJECTION, SOLUTION INTRAMUSCULAR; INTRAVENOUS; SUBCUTANEOUS AS NEEDED
Status: DISCONTINUED | OUTPATIENT
Start: 2024-02-05 | End: 2024-02-05 | Stop reason: HOSPADM

## 2024-02-05 RX ORDER — ONDANSETRON 2 MG/ML
4 INJECTION INTRAMUSCULAR; INTRAVENOUS ONCE
Status: COMPLETED | OUTPATIENT
Start: 2024-02-05 | End: 2024-02-05

## 2024-02-05 RX ORDER — CYCLOBENZAPRINE HCL 5 MG
5 TABLET ORAL 3 TIMES DAILY PRN
Qty: 12 TABLET | Refills: 0 | Status: SHIPPED | OUTPATIENT
Start: 2024-02-05

## 2024-02-05 RX ORDER — KETOROLAC TROMETHAMINE 30 MG/ML
30 INJECTION, SOLUTION INTRAMUSCULAR; INTRAVENOUS EVERY 6 HOURS PRN
Status: DISCONTINUED | OUTPATIENT
Start: 2024-02-05 | End: 2024-02-05 | Stop reason: HOSPADM

## 2024-02-05 RX ADMIN — ONDANSETRON 4 MG: 2 INJECTION INTRAMUSCULAR; INTRAVENOUS at 09:28

## 2024-02-05 RX ADMIN — SODIUM CHLORIDE 500 ML: 9 INJECTION, SOLUTION INTRAVENOUS at 09:28

## 2024-02-05 RX ADMIN — KETOROLAC TROMETHAMINE 30 MG: 30 INJECTION, SOLUTION INTRAMUSCULAR; INTRAVENOUS at 09:30

## 2024-02-05 RX ADMIN — IOPAMIDOL 85 ML: 612 INJECTION, SOLUTION INTRAVENOUS at 10:03

## 2024-02-05 NOTE — DISCHARGE INSTRUCTIONS
Please return with worsening or change in symptoms and follow up with primary care as soon as possible. Please use Ibuprofen along with muscle relaxer for pain.

## 2024-02-05 NOTE — PROGRESS NOTES
I visited this patient to help her complete a living will, per her request. The document is signed and notarized. I left one copy with the patient's documents and left the original with the patient. I faxed a copy to HIM.

## 2024-02-05 NOTE — ED PROVIDER NOTES
Subjective   History of Present Illness  Patient is a 43-year-old female with past medical history of hypertension, migraines, asthma, presents with 1 week of right-sided flank pain.  Patient states it starts in the right mid/upper back and radiates to the right flank.  Patient states she also has associated nausea without vomiting.  Patient denies any history of kidney stones.  When discussing her gallbladder, patient states she did have a HIDA scan last year and it was completely normal.  Patient denies any bowel changes.  Patient states the pain is not bad if she is just resting but if she moves is when the pain becomes more severe especially a twisting motion.  Patient denies chest pain, shortness of breath, fever, vomiting, diarrhea, dysuria, hematuria  Review of Systems   Constitutional:  Negative for chills and fever.   Respiratory:  Negative for shortness of breath.    Cardiovascular:  Negative for chest pain.   Gastrointestinal:  Positive for nausea. Negative for abdominal pain, blood in stool, constipation, diarrhea and vomiting.   Genitourinary:  Positive for flank pain. Negative for decreased urine volume, difficulty urinating, dysuria, hematuria, pelvic pain and urgency.       Past Medical History:   Diagnosis Date    Allergic rhinitis     Anxiety and depression     situational;  was deployed    Asthma     Asthma, extrinsic Childood    Asthma, intrinsic     Childhood    Diabetes mellitus     gestational    GERD (gastroesophageal reflux disease)     Gestational diabetes     Hypertension     Had preeclampsia and CHTN since then    Migraines     Pneumonia Childhood       Allergies   Allergen Reactions    Doxycycline      Chemical esophagitis      Avelox [Moxifloxacin] Rash    Zithromax [Azithromycin] Rash       Past Surgical History:   Procedure Laterality Date     SECTION       SECTION WITH TUBAL Bilateral 2018    Procedure:  SECTION REPEAT WITH TUBAL * 37  WKS - GEST. DIABETIC, ELEVATED BPS*;  Surgeon: Terrence Dutta MD;  Location: [facility] LABOR DELIVERY;  Service: Obstetrics/Gynecology    WISDOM TOOTH EXTRACTION  1998       Family History   Problem Relation Age of Onset    Hypothyroidism Father     Uterine cancer Mother         possibly    Breast cancer Mother         Lumpectomy    Hypothyroidism Mother     Hypothyroidism Brother     Asthma Brother     Asthma Brother     Asthma Sister     Hypothyroidism Sister     Asthma Sister     Diabetes Sister     Cancer Maternal Grandmother         Lung CA    Cancer Maternal Grandfather         Lung CA    Ovarian cancer Neg Hx     Colon cancer Neg Hx        Social History     Socioeconomic History    Marital status:    Tobacco Use    Smoking status: Never     Passive exposure: Never    Smokeless tobacco: Never   Vaping Use    Vaping Use: Never used   Substance and Sexual Activity    Alcohol use: Yes     Comment: occasional    Drug use: No    Sexual activity: Yes     Partners: Male     Birth control/protection: Tubal ligation           Objective   Physical Exam  Physical Exam  GENERAL:Well developed.  Appears in no acute distress.  HENT: Nares patent  EYES: No scleral icterus  CV: Regular rhythm, regular rate  RESPIRATORY: Normal effort.  No audible wheezes, rales or rhonchi  ABDOMEN: Soft, nontender. No rebound tenderness or guarding noted.  Mild tenderness right flank.  Mild right CVA tenderness.  MUSCULOSKELETAL: No deformities.  Pain with movement of right arm in the right mid back/flank.  NEURO: Alert, moves all extremities, follows commands  SKIN: Warm, dry, no rash visualized    Procedures     Differential diagnosis includes musculoskeletal pain, cholelithiasis, cholecystitis, choledocholithiasis, kidney stone, UTI      ED Course  ED Course as of 02/05/24 1652   Mon Feb 05, 2024   1018 CT scan read by radiologist does not show any acute abnormalities. [OM]      ED Course User Index  [OM] Yudi Meyers PA-C       43-year-old female presents with right flank pain and nausea for 1 week.  Patient states the pain is worse with movement and is very minimal at rest.  Patient states the worst pain is twisting to the right side.  Lab work nonactionable including normal BUN and creatinine, normal white count, urinalysis does not indicate a UTI.  Patient denies chest pain, shortness of breath, palpitations, vomiting, diarrhea, cough, congestion.  Patient is PERC negative.  Due to the nature of pain with movement and palpation and length of time of pain likely musculoskeletal.  Patient given Toradol and muscle relaxer and states that her pain is much better . discussed with patient the results from today's visit. Discussed with patient strict return precautions and they verbalize understanding. Recommend to them following up with primary care as soon as possible. Patient is discharged hemodynamically stable and comfortable.                                          Medical Decision Making  Problems Addressed:  Flank pain: complicated acute illness or injury    Amount and/or Complexity of Data Reviewed  Labs: ordered.  Radiology: ordered.    Risk  Prescription drug management.        Final diagnoses:   Flank pain       ED Disposition  ED Disposition       ED Disposition   Discharge    Condition   Stable    Comment   --               Marianna Funk PA-C  [address] [place] [address] [phone]    Schedule an appointment as soon as possible for a visit   Re check         Medication List        New Prescriptions      cyclobenzaprine 5 MG tablet  Commonly known as: FLEXERIL  Take 1 tablet by mouth 3 (Three) Times a Day As Needed for Muscle Spasms.               Where to Get Your Medications        These medications were sent to [id number] - [place] - [address] - [phone]  - [phone] FX [address] [place] [place] [address]      Phone: [phone]   cyclobenzaprine 5 MG tablet             [facility], FIDENCIO Bagley  02/05/24 3133

## 2024-02-19 DIAGNOSIS — J45.50 SEVERE PERSISTENT ASTHMA WITHOUT COMPLICATION: ICD-10-CM

## 2024-02-19 RX ORDER — FLUTICASONE FUROATE, UMECLIDINIUM BROMIDE AND VILANTEROL TRIFENATATE 200; 62.5; 25 UG/1; UG/1; UG/1
1 POWDER RESPIRATORY (INHALATION) DAILY
Qty: 60 EACH | Refills: 0 | Status: SHIPPED | OUTPATIENT
Start: 2024-02-19

## 2024-02-22 ENCOUNTER — SPECIALTY PHARMACY (OUTPATIENT)
Dept: PHARMACY | Facility: HOSPITAL | Age: 44
End: 2024-02-22
Payer: COMMERCIAL

## 2024-02-22 NOTE — PROGRESS NOTES
Patient has been transitioned from Dupixent to Nucala therapy for eosinophilic asthma. Pulmonary advised change on 11/27/2023, 6 month follow up for Nucala therapy due on 05/27/2024. Patient noted great improvement in symptoms early after starting Nucala back in December.     Jolly Zavala, Pharmacy Intern  02/22/24  08:54 EST

## 2024-02-29 ENCOUNTER — HOSPITAL ENCOUNTER (OUTPATIENT)
Dept: MAMMOGRAPHY | Facility: HOSPITAL | Age: 44
Discharge: HOME | End: 2024-02-29
Admitting: OBSTETRICS & GYNECOLOGY
Payer: COMMERCIAL

## 2024-02-29 DIAGNOSIS — Z12.31 BREAST CANCER SCREENING BY MAMMOGRAM: ICD-10-CM

## 2024-02-29 PROCEDURE — 77067 SCR MAMMO BI INCL CAD: CPT

## 2024-02-29 PROCEDURE — 77063 BREAST TOMOSYNTHESIS BI: CPT

## 2024-03-12 ENCOUNTER — DISEASE STATE MANAGEMENT VISIT (OUTPATIENT)
Dept: PHARMACY | Facility: HOSPITAL | Age: 44
End: 2024-03-12
Payer: COMMERCIAL

## 2024-03-12 VITALS
HEART RATE: 81 BPM | OXYGEN SATURATION: 95 % | BODY MASS INDEX: 31.82 KG/M2 | SYSTOLIC BLOOD PRESSURE: 120 MMHG | DIASTOLIC BLOOD PRESSURE: 74 MMHG | HEIGHT: 65 IN | WEIGHT: 191 LBS

## 2024-03-12 DIAGNOSIS — J45.50 SEVERE PERSISTENT ASTHMA WITHOUT COMPLICATION: Primary | ICD-10-CM

## 2024-03-12 PROCEDURE — 99214 OFFICE O/P EST MOD 30 MIN: CPT | Performed by: NURSE PRACTITIONER

## 2024-03-12 PROCEDURE — G0463 HOSPITAL OUTPT CLINIC VISIT: HCPCS | Performed by: NURSE PRACTITIONER

## 2024-03-12 RX ORDER — ONDANSETRON 4 MG/1
1 TABLET, FILM COATED ORAL 3 TIMES DAILY
COMMUNITY
Start: 2024-02-19

## 2024-03-12 RX ORDER — ALBUTEROL SULFATE AND BUDESONIDE 90; 80 UG/1; UG/1
2 AEROSOL, METERED RESPIRATORY (INHALATION) EVERY 4 HOURS PRN
Qty: 10.7 G | Refills: 5 | Status: SHIPPED | OUTPATIENT
Start: 2024-03-12

## 2024-03-12 NOTE — PROGRESS NOTES
Follow Up Office Visit      Patient Name: Janell Terrell    Chief Complaint:  Asthma biologic follow up      History of Present Illness: Janell Terrell is a 43 y.o. female who is here today for follow up of severe persistent asthma. Since last visit, she transitioned to use of Nucala. Compared to Dupixent, she has had better control of her respiratory symptoms and resolution of joint pain, though notices the effects of Nucala wearing off before her next dose is due. She is able to climb stairs more easily now. She will experience wheezing toward the end of the month when she is due for Nucala. She does avoid SEKOU use because it makes her jittery. No current cough. She was on IT in the past though had injection site reactions and did not continue use.    Subjective      Review of Systems:  Review of Systems   Constitutional:  Negative for fever and unexpected weight change.   Respiratory:  Positive for shortness of breath and wheezing. Negative for cough.    Cardiovascular:  Negative for chest pain and leg swelling.   Allergic/Immunologic: Positive for environmental allergies.        Past Medical History:   Past Medical History:   Diagnosis Date    Allergic rhinitis     Anxiety and depression     situational;  was deployed    Asthma     Asthma, extrinsic Childood    Asthma, intrinsic     Childhood    Diabetes mellitus     gestational    GERD (gastroesophageal reflux disease)     Gestational diabetes     Hypertension     Had preeclampsia and CHTN since then    Migraines     Pneumonia Childhood       Past Surgical History:   Past Surgical History:   Procedure Laterality Date     SECTION       SECTION WITH TUBAL Bilateral 2018    Procedure:  SECTION REPEAT WITH TUBAL * 37 WKS - GEST. DIABETIC, ELEVATED BPS*;  Surgeon: Terrence Dutta MD;  Location: [facility] LABOR DELIVERY;  Service: Obstetrics/Gynecology    WISDOM TOOTH EXTRACTION         Family History:    Family History   Problem Relation Age of Onset    Uterine cancer Mother         possibly    Breast cancer Mother 40        Lumpectomy    Hypothyroidism Mother     Hypothyroidism Father     Asthma Sister     Hypothyroidism Sister     Asthma Sister     Diabetes Sister     Hypothyroidism Brother     Asthma Brother     Asthma Brother     Cancer Maternal Grandmother         Lung CA    Cancer Maternal Grandfather         Lung CA    Ovarian cancer Neg Hx     Colon cancer Neg Hx        Social History:   Social History     Socioeconomic History    Marital status:    Tobacco Use    Smoking status: Never     Passive exposure: Never    Smokeless tobacco: Never   Vaping Use    Vaping status: Never Used   Substance and Sexual Activity    Alcohol use: Yes     Comment: occasional    Drug use: No    Sexual activity: Yes     Partners: Male     Birth control/protection: Tubal ligation       Current Medications:     Current Outpatient Medications:     albuterol (ACCUNEB) 1.25 MG/3ML nebulizer solution, Take 3 mL by nebulization Every 6 (Six) Hours As Needed for Wheezing or Shortness of Air., Disp: 90 mL, Rfl: 0    cetirizine (zyrTEC) 10 MG tablet, Take 1 tablet by mouth Daily., Disp: , Rfl:     clonazePAM (KlonoPIN) 0.5 MG tablet, Take 1 tablet by mouth As Needed., Disp: , Rfl:     dexAMETHasone (DECADRON) 1 MG tablet, TAKE 6 TABLETS BY MOUTH ON DAY 1 THEN DECREASE BY 1 TABLET EVERY DAY UNTIL ALL ARE  take with food may divide doses, Disp: , Rfl:     EPINEPHrine (EPIPEN) 0.3 MG/0.3ML solution auto-injector injection, INJECT SYRINGE INTO THE APPROPRIATE MUSCLE AS DIRECTED FOR ALLERGIC REACTION, Disp: , Rfl:     flunisolide (NASALIDE) 25 MCG/ACT (0.025%) solution nasal spray, Inhale 2 sprays Every 12 (Twelve) Hours., Disp: , Rfl:     imiquimod (Aldara) 5 % cream, Apply 1 application  topically to the appropriate area as directed 3 (Three) Times a Week., Disp: 12 each, Rfl: 2    ipratropium (ATROVENT HFA) 17 MCG/ACT inhaler,  Inhale 2 puffs 4 (Four) Times a Day As Needed for Wheezing (shortness of breath)., Disp: 12.9 g, Rfl: 5    ipratropium (ATROVENT) 0.02 % nebulizer solution, Take 2.5 mL by nebulization 4 (Four) Times a Day As Needed for Wheezing or Shortness of Air., Disp: 2.5 mL, Rfl: 5    levalbuterol (XOPENEX HFA) 45 MCG/ACT inhaler, Inhale 2 puffs 4 (Four) Times a Day As Needed for Wheezing or Shortness of Air., Disp: 15 g, Rfl: 5    levalbuterol (XOPENEX) 1.25 MG/3ML nebulizer solution, Take 1 ampule by nebulization 4 (Four) Times a Day As Needed for Wheezing or Shortness of Air., Disp: 120 mL, Rfl: 5    losartan (COZAAR) 50 MG tablet, Take 1 tablet by mouth Daily. (Patient taking differently: Take 1 tablet by mouth 2 (Two) Times a Day.), Disp: 60 tablet, Rfl: 0    Mepolizumab (Nucala) 100 MG/ML solution auto-injector, Inject 1 mL under the skin into the appropriate area as directed Every 28 (Twenty-Eight) Days., Disp: 3 mL, Rfl: 3    montelukast (SINGULAIR) 10 MG tablet, Take 1 tablet by mouth Every Night., Disp: 30 tablet, Rfl: 5    omeprazole (priLOSEC) 40 MG capsule, Take 1 capsule by mouth Daily., Disp: 30 capsule, Rfl: 0    ondansetron (ZOFRAN) 4 MG tablet, Take 1 tablet by mouth 3 times a day., Disp: , Rfl:     prochlorperazine (COMPAZINE) 10 MG tablet, Take 1 tablet by mouth 3 times a day. As needed, Disp: , Rfl:     Semaglutide, 1 MG/DOSE, (OZEMPIC) 2 MG/1.5ML solution pen-injector, Inject  under the skin into the appropriate area as directed 1 (One) Time Per Week., Disp: , Rfl:     sertraline (ZOLOFT) 50 MG tablet, Take 1 tablet by mouth Daily., Disp: , Rfl:     Trelegy Ellipta 200-62.5-25 MCG/ACT aerosol powder , INHALE 1 PUFF BY MOUTH EVERY DAY, Disp: 60 each, Rfl: 0    cyclobenzaprine (FLEXERIL) 5 MG tablet, Take 1 tablet by mouth 3 (Three) Times a Day As Needed for Muscle Spasms. (Patient not taking: Reported on 3/12/2024), Disp: 12 tablet, Rfl: 0     Allergies:   Allergies   Allergen Reactions    Doxycycline       "Chemical esophagitis      Avelox [Moxifloxacin] Rash    Zithromax [Azithromycin] Rash       Objective     Physical Exam:  Vital Signs:   Vitals:    03/12/24 1122   BP: 120/74   Pulse: 81   SpO2: 95%   Weight: 86.6 kg (191 lb)   Height: 165.1 cm (65\")     Body mass index is 31.78 kg/m².    Physical Exam  Vitals reviewed.   Constitutional:       General: She is not in acute distress.     Appearance: She is not toxic-appearing.   HENT:      Head: Normocephalic and atraumatic.      Mouth/Throat:      Mouth: Mucous membranes are moist.   Eyes:      Conjunctiva/sclera: Conjunctivae normal.   Cardiovascular:      Rate and Rhythm: Normal rate and regular rhythm.      Heart sounds: Normal heart sounds.   Pulmonary:      Effort: Pulmonary effort is normal.      Breath sounds: Wheezing present.   Abdominal:      General: There is no distension.      Palpations: Abdomen is soft.   Musculoskeletal:         General: No swelling.      Cervical back: Neck supple.   Skin:     General: Skin is warm and dry.      Findings: No rash.   Neurological:      General: No focal deficit present.      Mental Status: She is alert and oriented to person, place, and time.   Psychiatric:         Mood and Affect: Mood normal.         Behavior: Behavior normal.       Results Review:   WBC   Date Value Ref Range Status   02/05/2024 9.53 3.40 - 10.80 10*3/mm3 Final     RBC   Date Value Ref Range Status   02/05/2024 4.48 3.77 - 5.28 10*6/mm3 Final     Hemoglobin   Date Value Ref Range Status   02/05/2024 14.6 12.0 - 15.9 g/dL Final     Hematocrit   Date Value Ref Range Status   02/05/2024 43.7 34.0 - 46.6 % Final     MCV   Date Value Ref Range Status   02/05/2024 97.5 (H) 79.0 - 97.0 fL Final     MCH   Date Value Ref Range Status   02/05/2024 32.6 26.6 - 33.0 pg Final     MCHC   Date Value Ref Range Status   02/05/2024 33.4 31.5 - 35.7 g/dL Final     RDW   Date Value Ref Range Status   02/05/2024 12.0 (L) 12.3 - 15.4 % Final     RDW-SD   Date Value Ref " Range Status   02/05/2024 42.8 37.0 - 54.0 fl Final     MPV   Date Value Ref Range Status   02/05/2024 9.8 6.0 - 12.0 fL Final     Platelets   Date Value Ref Range Status   02/05/2024 349 140 - 450 10*3/mm3 Final     Neutrophil %   Date Value Ref Range Status   02/05/2024 66.1 42.7 - 76.0 % Final     Lymphocyte %   Date Value Ref Range Status   02/05/2024 22.0 19.6 - 45.3 % Final     Monocyte %   Date Value Ref Range Status   02/05/2024 8.3 5.0 - 12.0 % Final     Eosinophil %   Date Value Ref Range Status   02/05/2024 2.2 0.3 - 6.2 % Final     Basophil %   Date Value Ref Range Status   02/05/2024 0.7 0.0 - 1.5 % Final     Immature Grans %   Date Value Ref Range Status   02/05/2024 0.7 (H) 0.0 - 0.5 % Final     Neutrophils, Absolute   Date Value Ref Range Status   02/05/2024 6.29 1.70 - 7.00 10*3/mm3 Final     Lymphocytes, Absolute   Date Value Ref Range Status   02/05/2024 2.10 0.70 - 3.10 10*3/mm3 Final     Monocytes, Absolute   Date Value Ref Range Status   02/05/2024 0.79 0.10 - 0.90 10*3/mm3 Final     Eosinophils, Absolute   Date Value Ref Range Status   02/05/2024 0.21 0.00 - 0.40 10*3/mm3 Final     Basophils, Absolute   Date Value Ref Range Status   02/05/2024 0.07 0.00 - 0.20 10*3/mm3 Final     Immature Grans, Absolute   Date Value Ref Range Status   02/05/2024 0.07 (H) 0.00 - 0.05 10*3/mm3 Final     nRBC   Date Value Ref Range Status   02/05/2024 0.0 0.0 - 0.2 /100 WBC Final     Assessment / Plan      Assessment/Plan:   Diagnoses and all orders for this visit:    1. Severe persistent asthma without complication (Primary)  -     Albuterol-Budesonide (Airsupra) 90-80 MCG/ACT aerosol; Inhale 2 puffs Every 4 (Four) Hours As Needed (Shortness of breath, cough, wheezing).  Dispense: 10.7 g; Refill: 5  -     Ambulatory Referral to Disease State Management  Improvement on Nucala compared to Dupixent, though still not adequately controlled. Will continue on Trelegy as maintenance inhaler. Add Airsupra to begin use  PRN. Will refer back to pharmacy services to work toward authorization for Tezspire to replace Nucala. She does have prednisone at home to use if needed for an exacerbation.    Follow Up:   Return in about 6 months (around 9/12/2024) for Recheck.  The patient was counseled on diagnostic results, risks and benefits of treatment options, risk factor modifications and the importance of treatment compliance. The patient was advised to contact the clinic with concerns or worsening symptoms.     GEGE Smith   Pulmonary Medicine [place]

## 2024-03-22 ENCOUNTER — SPECIALTY PHARMACY (OUTPATIENT)
Dept: PHARMACY | Facility: HOSPITAL | Age: 44
End: 2024-03-22
Payer: COMMERCIAL

## 2024-03-26 ENCOUNTER — SPECIALTY PHARMACY (OUTPATIENT)
Dept: PHARMACY | Facility: HOSPITAL | Age: 44
End: 2024-03-26
Payer: COMMERCIAL

## 2024-03-26 RX ORDER — TEZEPELUMAB-EKKO 210 MG/1.9ML
1.91 INJECTION, SOLUTION SUBCUTANEOUS
Qty: 1.91 ML | Refills: 11 | Status: SHIPPED | OUTPATIENT
Start: 2024-03-26

## 2024-03-26 NOTE — PROGRESS NOTES
Ambulatory Care Clinic  Specialty Pharmacy Initiation Review       Janell Terrell is a 43 y.o. YO female who has been diagnosed with Steroid dependent asthma and prescribed Tezspire. Patient has been referred to the [facility] to receive specialty pharmacy services for medication initiation/management.    Indication, effectiveness, safety and convenience of medication reviewed today. Prior authorization approved.    PharmD will conduct initial clinical assessment and provide patient education during first clinic appointment on 4/4/24.  Patient last received Nucala on 3/1/24.    Georgia Wright, Pharmacy Intern  3/26/2024  09:23 EDT

## 2024-03-27 DIAGNOSIS — J45.50 SEVERE PERSISTENT ASTHMA WITHOUT COMPLICATION: ICD-10-CM

## 2024-03-27 RX ORDER — FLUTICASONE FUROATE, UMECLIDINIUM BROMIDE AND VILANTEROL TRIFENATATE 200; 62.5; 25 UG/1; UG/1; UG/1
1 POWDER RESPIRATORY (INHALATION) DAILY
Qty: 60 EACH | Refills: 0 | Status: SHIPPED | OUTPATIENT
Start: 2024-03-27

## 2024-04-11 ENCOUNTER — DISEASE STATE MANAGEMENT VISIT (OUTPATIENT)
Dept: PHARMACY | Facility: HOSPITAL | Age: 44
End: 2024-04-11
Payer: COMMERCIAL

## 2024-04-11 ENCOUNTER — SPECIALTY PHARMACY (OUTPATIENT)
Dept: PHARMACY | Facility: HOSPITAL | Age: 44
End: 2024-04-11
Payer: COMMERCIAL

## 2024-04-11 VITALS
DIASTOLIC BLOOD PRESSURE: 75 MMHG | OXYGEN SATURATION: 94 % | WEIGHT: 191 LBS | HEART RATE: 70 BPM | SYSTOLIC BLOOD PRESSURE: 125 MMHG | BODY MASS INDEX: 31.78 KG/M2

## 2024-04-11 DIAGNOSIS — Z87.09 H/O EXTRINSIC ASTHMA: Primary | ICD-10-CM

## 2024-04-11 PROCEDURE — G0463 HOSPITAL OUTPT CLINIC VISIT: HCPCS

## 2024-04-11 RX ORDER — OMEPRAZOLE 20 MG/1
1 CAPSULE, DELAYED RELEASE ORAL DAILY
COMMUNITY
Start: 2024-03-26 | End: 2024-04-11

## 2024-04-11 NOTE — PROGRESS NOTES
Patient seen in clinic today for Tezspire education and first injection. See clinic note for details.

## 2024-04-11 NOTE — PROGRESS NOTES
[facility]  Asthma Management     Janell Terrell is a 43 y.o. female referred by pulmonology to the [facility] for severe eosinophilic asthma. The patient's current asthma regimen includes: Trelegy 200-62.4-25 1 puff daily, albuterol-budesonide 90-80 2 puffs Q4H PRN SOB, ipratropium 17 mcg 2 puffs Q4H PRN wheezing, levalbuterol 45 mcg 2 puffs Q4H PRN wheezing or SOA, ipratropium 0.02% nebulizer solution 2.5mL 4 times daily PRN wheezing or SOA, montelukast 10 mg QD and patient reports good adherence to maintenance regimen. Patient is not a smoker and/or exposed to second hand smoke.       Patient self rates asthma control as poor. Uses rescue inhaler frequently. She has tried multiple biologic agents in the past and though these improved asthma symptoms, she had frequent rashes (non-responsive to pretreatment with famotidine/benadryl).       Past Medical History:   Diagnosis Date    Allergic rhinitis     Anxiety and depression 2006    situational;  was deployed    Asthma     Asthma, extrinsic Childood    Asthma, intrinsic     Childhood    Diabetes mellitus     gestational    GERD (gastroesophageal reflux disease)     Gestational diabetes     Hypertension 2008    Had preeclampsia and CHTN since then    Migraines     Pneumonia Childhood     Social History     Socioeconomic History    Marital status:    Tobacco Use    Smoking status: Never     Passive exposure: Never    Smokeless tobacco: Never   Vaping Use    Vaping status: Never Used   Substance and Sexual Activity    Alcohol use: Yes     Comment: occasional    Drug use: No    Sexual activity: Yes     Partners: Male     Birth control/protection: Tubal ligation     Doxycycline, Avelox [moxifloxacin], and Zithromax [azithromycin]    Current Outpatient Medications:     Albuterol-Budesonide (Airsupra) 90-80 MCG/ACT aerosol, Inhale 2 puffs Every 4 (Four) Hours As Needed (Shortness of breath, cough, wheezing)., Disp: 10.7 g,  Rfl: 5    cetirizine (zyrTEC) 10 MG tablet, Take 1 tablet by mouth Daily., Disp: , Rfl:     clonazePAM (KlonoPIN) 0.5 MG tablet, Take 1 tablet by mouth As Needed., Disp: , Rfl:     EPINEPHrine (EPIPEN) 0.3 MG/0.3ML solution auto-injector injection, INJECT SYRINGE INTO THE APPROPRIATE MUSCLE AS DIRECTED FOR ALLERGIC REACTION, Disp: , Rfl:     flunisolide (NASALIDE) 25 MCG/ACT (0.025%) solution nasal spray, Inhale 2 sprays Every 12 (Twelve) Hours., Disp: , Rfl:     levalbuterol (XOPENEX HFA) 45 MCG/ACT inhaler, Inhale 2 puffs 4 (Four) Times a Day As Needed for Wheezing or Shortness of Air., Disp: 15 g, Rfl: 5    losartan (COZAAR) 50 MG tablet, Take 1 tablet by mouth Daily. (Patient taking differently: Take 1 tablet by mouth 2 (Two) Times a Day.), Disp: 60 tablet, Rfl: 0    montelukast (SINGULAIR) 10 MG tablet, Take 1 tablet by mouth Every Night., Disp: 30 tablet, Rfl: 5    omeprazole (priLOSEC) 40 MG capsule, Take 1 capsule by mouth Daily., Disp: 30 capsule, Rfl: 0    ondansetron (ZOFRAN) 4 MG tablet, Take 1 tablet by mouth 3 times a day., Disp: , Rfl:     Semaglutide, 1 MG/DOSE, (OZEMPIC) 2 MG/1.5ML solution pen-injector, Inject  under the skin into the appropriate area as directed 1 (One) Time Per Week., Disp: , Rfl:     sertraline (ZOLOFT) 50 MG tablet, Take 1 tablet by mouth Daily., Disp: , Rfl:     Tezepelumab-ekko (Tezspire) 210 MG/1.91ML solution auto-injector, Inject 1.91 mL under the skin into the appropriate area as directed Every 28 (Twenty-Eight) Days. **See note to pharmacy for copay card information**, Disp: 1.91 mL, Rfl: 11    Trelegy Ellipta 200-62.5-25 MCG/ACT inhaler, INHALE 1 PUFF BY MOUTH EVERY DAY, Disp: 60 each, Rfl: 0    albuterol (ACCUNEB) 1.25 MG/3ML nebulizer solution, Take 3 mL by nebulization Every 6 (Six) Hours As Needed for Wheezing or Shortness of Air. (Patient not taking: Reported on 4/11/2024), Disp: 90 mL, Rfl: 0    cyclobenzaprine (FLEXERIL) 5 MG tablet, Take 1 tablet by mouth 3  (Three) Times a Day As Needed for Muscle Spasms. (Patient not taking: Reported on 3/12/2024), Disp: 12 tablet, Rfl: 0    imiquimod (Aldara) 5 % cream, Apply 1 application  topically to the appropriate area as directed 3 (Three) Times a Week. (Patient not taking: Reported on 4/11/2024), Disp: 12 each, Rfl: 2    ipratropium (ATROVENT HFA) 17 MCG/ACT inhaler, Inhale 2 puffs 4 (Four) Times a Day As Needed for Wheezing (shortness of breath). (Patient not taking: Reported on 4/11/2024), Disp: 12.9 g, Rfl: 5    ipratropium (ATROVENT) 0.02 % nebulizer solution, Take 2.5 mL by nebulization 4 (Four) Times a Day As Needed for Wheezing or Shortness of Air. (Patient not taking: Reported on 4/11/2024), Disp: 2.5 mL, Rfl: 5    levalbuterol (XOPENEX) 1.25 MG/3ML nebulizer solution, Take 1 ampule by nebulization 4 (Four) Times a Day As Needed for Wheezing or Shortness of Air. (Patient not taking: Reported on 4/11/2024), Disp: 120 mL, Rfl: 5    omeprazole (priLOSEC) 20 MG capsule, Take 1 capsule by mouth Daily. (Patient not taking: Reported on 4/11/2024), Disp: , Rfl:     prochlorperazine (COMPAZINE) 10 MG tablet, Take 1 tablet by mouth 3 times a day. As needed (Patient not taking: Reported on 4/11/2024), Disp: , Rfl:     Vaccination Status   COVID 19: Up to date  Influenza: Yearly  Pneumococcal:   Zoster: Due for 2nd dose    Drug-Drug Interactions: none noted with Tezspire    Drug-Disease Interactions (non-cardioselective beta blockers, NSAIDs):     Patient Assistance: [facility]; PA approved    Inhaler Technique Observed? No     Treatment Goals: Risk Reduction and Symptom Control     Patient Education:  Initial Education Provided for Tezspire  The patient has been provided with the following education for Tezspire. All questions and concerns have been addressed prior to the patient receiving the medication, and the patient has verbalized understanding of the education and any materials provided. Additional patient  education shall be provided and documented upon request by the patient, provider or payer.      The following counseling points for Tezspire (tezepelumab) were addressed:  Goal of treatment:  This medication is used for the maintenance treatment of severe asthma in combination with other asthma medicines. The goal of treatment is to improve your breathing and prevent severe asthma attacks.  Tezspire is not a rescue treatment  Directions for use:  Tezspire is injected under your skin (subcutaneously) once every 4 weeks. You will receive your first injection in clinic. The injection is given into one of the following areas: thigh, abdomen (at least 2 inches away from navel), or back of upper arm if given by someone else. Remove medication from the refrigerator and allow to sit at room temperature for up to 60 minutes before use. Avoid administration in skin that is tender, bruised, red, or hard.  If you miss an injection, administer as soon as you remember it. If it is close to time for your next injection, skip the dose you missed and resume with your next injection. If you have questions, contact clinic for instructions on when to inject.  Adverse effects:  Possible side effects of this medication include: sore throat, joint pain, and back pain.  Even though it may be rare, some people may have severe side effects when taking a medication. Go to the ED or call 911 right away if you have any of the following: signs of an allergic reaction like rash; hives; wheezing; chest tightness; trouble breathing, swallowing, or talking; swelling of the mouth, face, lips, tongue, or throat; red, itchy, swollen, or inflamed eyes.  Other considerations:  Helminth infections: it is unknown if Tezspire will influence the immune response against parasitic infections. If you have a pre-existing helminth infection, this should be treated prior to starting Tezspire. If you become infected while taking Tezspire and do not respond to  anti-helminth treatment, Tezspire will need to be discontinued until the infection resolves.  Live vaccines: alert your doctors and pharmacist that you are taking Tezspire prior to getting any vaccines. Live attenuated vaccines should be avoided while you are treated with Tezspire (MMR, chickenpox, yellow fever, Zostavax*)  *Shingrix recommended for shingles vaccination as this is not a live vaccine    Identified barriers to adherence/administration: none    Medication Assessment & Plan:  Patient will begin Tezspire (tezepelumab) 210 mg SQ every 4 weeks for severe eosinophilic asthma. Medication counseling provided to patient as documented above.  NDC: 42097-768-01  Lot: [id number]  Exp: 12/31/2025  Tezspire 210 mg SQ injection x1 provided in clinic on 4/11/24. Patient provided step-by-step demonstration of appropriate injection technique. All questions and concerns addressed. Patient reports they are comfortable administering injections at home.  Advised patient on the importance of continuing maintenance inhaler regimen and the importance of rinsing mouth after ICS use. This injection does not replace your maintenance inhaler and is not used to treat an asthma attack (use a rescue inhaler).   Recommended the following vaccinations: Shingrix 2nd dose  Patient will continue regular follow-up with pulmonology. Patient will follow-up  with specialty mail out services. Will follow-up in 6 months, or sooner if needed.     Georgia Wright, Pharmacy Intern  4/11/2024  09:00 EDT

## 2024-06-04 RX ORDER — PREDNISONE 20 MG/1
40 TABLET ORAL DAILY
Qty: 10 TABLET | Refills: 1 | Status: SHIPPED | OUTPATIENT
Start: 2024-06-04

## 2024-06-11 ENCOUNTER — HOSPITAL ENCOUNTER (OUTPATIENT)
Dept: GENERAL RADIOLOGY | Facility: HOSPITAL | Age: 44
Discharge: HOME | End: 2024-06-11
Payer: COMMERCIAL

## 2024-06-11 ENCOUNTER — TRANSCRIBE ORDERS (OUTPATIENT)
Dept: GENERAL RADIOLOGY | Facility: HOSPITAL | Age: 44
End: 2024-06-11
Payer: COMMERCIAL

## 2024-06-11 DIAGNOSIS — R05.9 COUGH, UNSPECIFIED TYPE: ICD-10-CM

## 2024-06-11 DIAGNOSIS — R05.9 COUGH, UNSPECIFIED TYPE: Primary | ICD-10-CM

## 2024-06-11 PROCEDURE — 71046 X-RAY EXAM CHEST 2 VIEWS: CPT

## 2024-06-12 ENCOUNTER — HOSPITAL ENCOUNTER (EMERGENCY)
Facility: HOSPITAL | Age: 44
Discharge: HOME | End: 2024-06-12
Attending: EMERGENCY MEDICINE
Payer: COMMERCIAL

## 2024-06-12 ENCOUNTER — APPOINTMENT (OUTPATIENT)
Dept: CT IMAGING | Facility: HOSPITAL | Age: 44
End: 2024-06-12
Payer: COMMERCIAL

## 2024-06-12 VITALS
RESPIRATION RATE: 20 BRPM | OXYGEN SATURATION: 95 % | WEIGHT: 197 LBS | HEIGHT: 65 IN | DIASTOLIC BLOOD PRESSURE: 79 MMHG | TEMPERATURE: 97.9 F | HEART RATE: 72 BPM | SYSTOLIC BLOOD PRESSURE: 136 MMHG | BODY MASS INDEX: 32.82 KG/M2

## 2024-06-12 DIAGNOSIS — D72.829 LEUKOCYTOSIS, UNSPECIFIED TYPE: ICD-10-CM

## 2024-06-12 DIAGNOSIS — R06.02 SHORTNESS OF BREATH: Primary | ICD-10-CM

## 2024-06-12 DIAGNOSIS — J40 BRONCHITIS: ICD-10-CM

## 2024-06-12 DIAGNOSIS — Z87.09 HISTORY OF ASTHMA: ICD-10-CM

## 2024-06-12 LAB
ALBUMIN SERPL-MCNC: 3.7 G/DL (ref 3.5–5.2)
ALBUMIN/GLOB SERPL: 1.2 G/DL
ALP SERPL-CCNC: 33 U/L (ref 39–117)
ALT SERPL W P-5'-P-CCNC: 35 U/L (ref 1–33)
ANION GAP SERPL CALCULATED.3IONS-SCNC: 12 MMOL/L (ref 5–15)
AST SERPL-CCNC: 11 U/L (ref 1–32)
B PARAPERT DNA SPEC QL NAA+PROBE: NOT DETECTED
B PERT DNA SPEC QL NAA+PROBE: NOT DETECTED
BASOPHILS # BLD AUTO: 0.06 10*3/MM3 (ref 0–0.2)
BASOPHILS NFR BLD AUTO: 0.2 % (ref 0–1.5)
BILIRUB SERPL-MCNC: 0.3 MG/DL (ref 0–1.2)
BUN SERPL-MCNC: 15 MG/DL (ref 6–20)
BUN/CREAT SERPL: 21.7 (ref 7–25)
C PNEUM DNA NPH QL NAA+NON-PROBE: NOT DETECTED
CALCIUM SPEC-SCNC: 9 MG/DL (ref 8.6–10.5)
CHLORIDE SERPL-SCNC: 103 MMOL/L (ref 98–107)
CO2 SERPL-SCNC: 23 MMOL/L (ref 22–29)
CREAT BLDA-MCNC: 0.6 MG/DL (ref 0.6–1.3)
CREAT SERPL-MCNC: 0.69 MG/DL (ref 0.57–1)
DEPRECATED RDW RBC AUTO: 47.7 FL (ref 37–54)
EGFRCR SERPLBLD CKD-EPI 2021: 111.3 ML/MIN/1.73
EOSINOPHIL # BLD AUTO: 0 10*3/MM3 (ref 0–0.4)
EOSINOPHIL NFR BLD AUTO: 0 % (ref 0.3–6.2)
ERYTHROCYTE [DISTWIDTH] IN BLOOD BY AUTOMATED COUNT: 13.3 % (ref 12.3–15.4)
FLUAV SUBTYP SPEC NAA+PROBE: NOT DETECTED
FLUBV RNA ISLT QL NAA+PROBE: NOT DETECTED
GLOBULIN UR ELPH-MCNC: 3.1 GM/DL
GLUCOSE SERPL-MCNC: 145 MG/DL (ref 65–99)
HADV DNA SPEC NAA+PROBE: NOT DETECTED
HCOV 229E RNA SPEC QL NAA+PROBE: NOT DETECTED
HCOV HKU1 RNA SPEC QL NAA+PROBE: NOT DETECTED
HCOV NL63 RNA SPEC QL NAA+PROBE: NOT DETECTED
HCOV OC43 RNA SPEC QL NAA+PROBE: NOT DETECTED
HCT VFR BLD AUTO: 40 % (ref 34–46.6)
HGB BLD-MCNC: 13.2 G/DL (ref 12–15.9)
HMPV RNA NPH QL NAA+NON-PROBE: NOT DETECTED
HOLD SPECIMEN: NORMAL
HPIV1 RNA ISLT QL NAA+PROBE: NOT DETECTED
HPIV2 RNA SPEC QL NAA+PROBE: NOT DETECTED
HPIV3 RNA NPH QL NAA+PROBE: NOT DETECTED
HPIV4 P GENE NPH QL NAA+PROBE: NOT DETECTED
IMM GRANULOCYTES # BLD AUTO: 0.58 10*3/MM3 (ref 0–0.05)
IMM GRANULOCYTES NFR BLD AUTO: 2.4 % (ref 0–0.5)
LYMPHOCYTES # BLD AUTO: 1.53 10*3/MM3 (ref 0.7–3.1)
LYMPHOCYTES NFR BLD AUTO: 6.3 % (ref 19.6–45.3)
M PNEUMO IGG SER IA-ACNC: NOT DETECTED
MCH RBC QN AUTO: 32.2 PG (ref 26.6–33)
MCHC RBC AUTO-ENTMCNC: 33 G/DL (ref 31.5–35.7)
MCV RBC AUTO: 97.6 FL (ref 79–97)
MONOCYTES # BLD AUTO: 1.26 10*3/MM3 (ref 0.1–0.9)
MONOCYTES NFR BLD AUTO: 5.2 % (ref 5–12)
NEUTROPHILS NFR BLD AUTO: 20.74 10*3/MM3 (ref 1.7–7)
NEUTROPHILS NFR BLD AUTO: 85.9 % (ref 42.7–76)
NRBC BLD AUTO-RTO: 0 /100 WBC (ref 0–0.2)
NT-PROBNP SERPL-MCNC: 49.6 PG/ML (ref 0–450)
PLATELET # BLD AUTO: 386 10*3/MM3 (ref 140–450)
PMV BLD AUTO: 9.6 FL (ref 6–12)
POTASSIUM SERPL-SCNC: 3.9 MMOL/L (ref 3.5–5.2)
PROT SERPL-MCNC: 6.8 G/DL (ref 6–8.5)
QT INTERVAL: 410 MS
QTC INTERVAL: 448 MS
RBC # BLD AUTO: 4.1 10*6/MM3 (ref 3.77–5.28)
RHINOVIRUS RNA SPEC NAA+PROBE: NOT DETECTED
RSV RNA NPH QL NAA+NON-PROBE: NOT DETECTED
SARS-COV-2 RNA NPH QL NAA+NON-PROBE: NOT DETECTED
SODIUM SERPL-SCNC: 138 MMOL/L (ref 136–145)
TROPONIN T SERPL HS-MCNC: <6 NG/L
WBC NRBC COR # BLD AUTO: 24.17 10*3/MM3 (ref 3.4–10.8)
WHOLE BLOOD HOLD COAG: NORMAL
WHOLE BLOOD HOLD SPECIMEN: NORMAL

## 2024-06-12 PROCEDURE — 84484 ASSAY OF TROPONIN QUANT: CPT | Performed by: EMERGENCY MEDICINE

## 2024-06-12 PROCEDURE — 85025 COMPLETE CBC W/AUTO DIFF WBC: CPT | Performed by: EMERGENCY MEDICINE

## 2024-06-12 PROCEDURE — 0202U NFCT DS 22 TRGT SARS-COV-2: CPT | Performed by: EMERGENCY MEDICINE

## 2024-06-12 PROCEDURE — 25510000001 IOPAMIDOL PER 1 ML: Performed by: EMERGENCY MEDICINE

## 2024-06-12 PROCEDURE — 93005 ELECTROCARDIOGRAM TRACING: CPT

## 2024-06-12 PROCEDURE — 94640 AIRWAY INHALATION TREATMENT: CPT

## 2024-06-12 PROCEDURE — 99285 EMERGENCY DEPT VISIT HI MDM: CPT

## 2024-06-12 PROCEDURE — 93005 ELECTROCARDIOGRAM TRACING: CPT | Performed by: EMERGENCY MEDICINE

## 2024-06-12 PROCEDURE — 82565 ASSAY OF CREATININE: CPT

## 2024-06-12 PROCEDURE — 71275 CT ANGIOGRAPHY CHEST: CPT

## 2024-06-12 PROCEDURE — 83880 ASSAY OF NATRIURETIC PEPTIDE: CPT | Performed by: EMERGENCY MEDICINE

## 2024-06-12 PROCEDURE — 80053 COMPREHEN METABOLIC PANEL: CPT | Performed by: EMERGENCY MEDICINE

## 2024-06-12 RX ORDER — IPRATROPIUM BROMIDE AND ALBUTEROL SULFATE 2.5; .5 MG/3ML; MG/3ML
3 SOLUTION RESPIRATORY (INHALATION) ONCE
Status: COMPLETED | OUTPATIENT
Start: 2024-06-12 | End: 2024-06-12

## 2024-06-12 RX ORDER — DEXAMETHASONE 1 MG
1 TABLET ORAL 2 TIMES DAILY WITH MEALS
COMMUNITY

## 2024-06-12 RX ORDER — SODIUM CHLORIDE 0.9 % (FLUSH) 0.9 %
10 SYRINGE (ML) INJECTION AS NEEDED
Status: DISCONTINUED | OUTPATIENT
Start: 2024-06-12 | End: 2024-06-12 | Stop reason: HOSPADM

## 2024-06-12 RX ADMIN — IOPAMIDOL 90 ML: 755 INJECTION, SOLUTION INTRAVENOUS at 11:29

## 2024-06-12 RX ADMIN — IPRATROPIUM BROMIDE AND ALBUTEROL SULFATE 3 ML: .5; 2.5 SOLUTION RESPIRATORY (INHALATION) at 10:22

## 2024-06-12 NOTE — ED PROVIDER NOTES
"Subjective   History of Present Illness  43-year-old female presents for evaluation of shortness of breath.  Of note, the patient has a history of asthma.  She is followed by pulmonology.  She tells me that she has been experiencing a nagging cough, wheezing, and shortness of breath for the past several weeks.  She has seen her physician regarding her symptoms multiple times and has completed 4 rounds of oral steroids as well as 2 rounds of oral antibiotics but does not seem to be improving.  She had an outpatient chest x-ray that showed \"atelectasis\" yesterday and was concerned about this and as result came here for evaluation as she is planning on traveling to [place] next week and wanted to be sure that she was safe to travel before leaving for her trip.  She denies any known exposures to anyone with COVID-19.  No fevers.  She notes mild relief at home with her nebs.      Review of Systems   Respiratory:  Positive for cough, shortness of breath and wheezing.    All other systems reviewed and are negative.      Past Medical History:   Diagnosis Date    Allergic rhinitis     Anxiety and depression     situational;  was deployed    Asthma     Asthma, extrinsic Childood    Asthma, intrinsic     Childhood    Diabetes mellitus     gestational    GERD (gastroesophageal reflux disease)     Gestational diabetes     Hypertension     Had preeclampsia and CHTN since then    Migraines     Pneumonia Childhood       Allergies   Allergen Reactions    Doxycycline      Chemical esophagitis      Avelox [Moxifloxacin] Rash    Zithromax [Azithromycin] Rash       Past Surgical History:   Procedure Laterality Date     SECTION       SECTION WITH TUBAL Bilateral 2018    Procedure:  SECTION REPEAT WITH TUBAL * 37 WKS - GEST. DIABETIC, ELEVATED BPS*;  Surgeon: Terrence Dutta MD;  Location: [facility] LABOR DELIVERY;  Service: Obstetrics/Gynecology    WISDOM TOOTH EXTRACTION   "       Family History   Problem Relation Age of Onset    Uterine cancer Mother         possibly    Breast cancer Mother 40        Lumpectomy    Hypothyroidism Mother     Hypothyroidism Father     Asthma Sister     Hypothyroidism Sister     Asthma Sister     Diabetes Sister     Hypothyroidism Brother     Asthma Brother     Asthma Brother     Cancer Maternal Grandmother         Lung CA    Cancer Maternal Grandfather         Lung CA    Ovarian cancer Neg Hx     Colon cancer Neg Hx        Social History     Socioeconomic History    Marital status:    Tobacco Use    Smoking status: Never     Passive exposure: Never    Smokeless tobacco: Never   Vaping Use    Vaping status: Never Used   Substance and Sexual Activity    Alcohol use: Yes     Comment: occasional    Drug use: No    Sexual activity: Yes     Partners: Male     Birth control/protection: Tubal ligation           Objective   Physical Exam  Vitals and nursing note reviewed.   Constitutional:       General: She is not in acute distress.     Appearance: She is well-developed. She is not diaphoretic.      Comments: Nontoxic-appearing female   HENT:      Head: Normocephalic and atraumatic.   Eyes:      Pupils: Pupils are equal, round, and reactive to light.   Cardiovascular:      Rate and Rhythm: Normal rate and regular rhythm.      Heart sounds: Normal heart sounds. No murmur heard.     No friction rub. No gallop.   Pulmonary:      Effort: Pulmonary effort is normal. No respiratory distress.      Breath sounds: Wheezing present. No rales.      Comments: Mild end expiratory wheezing noted bilaterally, no accessory muscle use or retractions noted, speaking in full sentences  Abdominal:      General: Bowel sounds are normal. There is no distension.      Palpations: Abdomen is soft. There is no mass.      Tenderness: There is no abdominal tenderness. There is no guarding or rebound.   Musculoskeletal:         General: Normal range of motion.      Cervical back: Neck  "supple.      Right lower leg: No edema.      Left lower leg: No edema.   Skin:     General: Skin is warm and dry.      Findings: No erythema or rash.   Neurological:      Mental Status: She is alert and oriented to person, place, and time.   Psychiatric:         Mood and Affect: Mood normal.         Thought Content: Thought content normal.         Judgment: Judgment normal.         Procedures           ED Course  ED Course as of 06/12/24 1523   Wed Jun 12, 2024   1022 43-year-old female presents for evaluation of shortness of breath.  Of note, the patient has a history of asthma.  She is followed by pulmonology.  She tells me that she has been experiencing cough, wheezing, and shortness of breath for the past several weeks.  She has seen her physician regarding her symptoms multiple times and has completed 4 rounds of oral steroids as well as 2 rounds of oral antibiotics but does not seem to be improving.  She had an outpatient chest x-ray that showed \"atelectasis\" and comes here today for a second opinion to be \"checked out\" as she is planning on going to [place] and wanted to ensure that she was safe to travel.  On arrival to the ED, the patient is nontoxic-appearing.  On exam, she has mild end expiratory wheezing.  She is speaking in full sentences.  No accessory muscle use or retractions noted.  Given the duration of the patient's symptoms and bounce back nature of her visit, we will escalate workup with advanced imaging.  Nebs given for symptom relief. [DD]   1024 Labs remarkable for white blood cell count of 24,000.  As previously noted, the patient has been on multiple rounds of steroids as of late and I feel that this is likely a contributing factor. [DD]   1106 Labs are otherwise bland/unrevealing.  Respiratory viral panel is negative. [DD]   1214 I personally and independently reviewed the patient's CT images and findings, and I am in agreement with the radiologist regarding CT interpretation--particularly " there is no pneumonia or pulmonary embolism present. [DD]   1217 Upon reevaluation, the patient looks and feels well.  No indication for further antibiotics at this time.  The patient is allergic to macrolides and is intolerant of doxycycline.  She will follow-up with her primary care physician within the next week.  Agreeable with plan and given appropriate strict return precautions. [DD]      ED Course User Index  [DD] Rico Vazquez MD                                   Recent Results (from the past 24 hour(s))   ECG 12 Lead ED Triage Standing Order; SOA    Collection Time: 06/12/24  9:16 AM   Result Value Ref Range    QT Interval 410 ms    QTC Interval 448 ms   Comprehensive Metabolic Panel    Collection Time: 06/12/24  9:55 AM    Specimen: Blood   Result Value Ref Range    Glucose 145 (H) 65 - 99 mg/dL    BUN 15 6 - 20 mg/dL    Creatinine 0.69 0.57 - 1.00 mg/dL    Sodium 138 136 - 145 mmol/L    Potassium 3.9 3.5 - 5.2 mmol/L    Chloride 103 98 - 107 mmol/L    CO2 23.0 22.0 - 29.0 mmol/L    Calcium 9.0 8.6 - 10.5 mg/dL    Total Protein 6.8 6.0 - 8.5 g/dL    Albumin 3.7 3.5 - 5.2 g/dL    ALT (SGPT) 35 (H) 1 - 33 U/L    AST (SGOT) 11 1 - 32 U/L    Alkaline Phosphatase 33 (L) 39 - 117 U/L    Total Bilirubin 0.3 0.0 - 1.2 mg/dL    Globulin 3.1 gm/dL    A/G Ratio 1.2 g/dL    BUN/Creatinine Ratio 21.7 7.0 - 25.0    Anion Gap 12.0 5.0 - 15.0 mmol/L    eGFR 111.3 >60.0 mL/min/1.73   BNP    Collection Time: 06/12/24  9:55 AM    Specimen: Blood   Result Value Ref Range    proBNP 49.6 0.0 - 450.0 pg/mL   Single High Sensitivity Troponin T    Collection Time: 06/12/24  9:55 AM    Specimen: Blood   Result Value Ref Range    HS Troponin T <6 <14 ng/L   Green Top (Gel)    Collection Time: 06/12/24  9:55 AM   Result Value Ref Range    Extra Tube Hold for add-ons.    Lavender Top    Collection Time: 06/12/24  9:55 AM   Result Value Ref Range    Extra Tube hold for add-on    Gold Top - SST    Collection Time: 06/12/24  9:55  AM   Result Value Ref Range    Extra Tube Hold for add-ons.    Gray Top    Collection Time: 06/12/24  9:55 AM   Result Value Ref Range    Extra Tube Hold for add-ons.    Light Blue Top    Collection Time: 06/12/24  9:55 AM   Result Value Ref Range    Extra Tube Hold for add-ons.    CBC Auto Differential    Collection Time: 06/12/24  9:55 AM    Specimen: Blood   Result Value Ref Range    WBC 24.17 (H) 3.40 - 10.80 10*3/mm3    RBC 4.10 3.77 - 5.28 10*6/mm3    Hemoglobin 13.2 12.0 - 15.9 g/dL    Hematocrit 40.0 34.0 - 46.6 %    MCV 97.6 (H) 79.0 - 97.0 fL    MCH 32.2 26.6 - 33.0 pg    MCHC 33.0 31.5 - 35.7 g/dL    RDW 13.3 12.3 - 15.4 %    RDW-SD 47.7 37.0 - 54.0 fl    MPV 9.6 6.0 - 12.0 fL    Platelets 386 140 - 450 10*3/mm3    Neutrophil % 85.9 (H) 42.7 - 76.0 %    Lymphocyte % 6.3 (L) 19.6 - 45.3 %    Monocyte % 5.2 5.0 - 12.0 %    Eosinophil % 0.0 (L) 0.3 - 6.2 %    Basophil % 0.2 0.0 - 1.5 %    Immature Grans % 2.4 (H) 0.0 - 0.5 %    Neutrophils, Absolute 20.74 (H) 1.70 - 7.00 10*3/mm3    Lymphocytes, Absolute 1.53 0.70 - 3.10 10*3/mm3    Monocytes, Absolute 1.26 (H) 0.10 - 0.90 10*3/mm3    Eosinophils, Absolute 0.00 0.00 - 0.40 10*3/mm3    Basophils, Absolute 0.06 0.00 - 0.20 10*3/mm3    Immature Grans, Absolute 0.58 (H) 0.00 - 0.05 10*3/mm3    nRBC 0.0 0.0 - 0.2 /100 WBC   Respiratory Panel PCR w/COVID-19(SARS-CoV-2) [facility]I/NENA/JONH/PAD/COR/PERRY In-House, NP Swab in [facility] 2 HR TAT - Swab, Nasopharynx    Collection Time: 06/12/24  9:56 AM    Specimen: Nasopharynx; Swab   Result Value Ref Range    ADENOVIRUS, PCR Not Detected Not Detected    Coronavirus 229E Not Detected Not Detected    Coronavirus HKU1 Not Detected Not Detected    Coronavirus NL63 Not Detected Not Detected    Coronavirus OC43 Not Detected Not Detected    COVID19 Not Detected Not Detected - Ref. Range    Human Metapneumovirus Not Detected Not Detected    Human Rhinovirus/Enterovirus Not Detected Not Detected    Influenza A PCR Not Detected Not  "Detected    Influenza B PCR Not Detected Not Detected    Parainfluenza Virus 1 Not Detected Not Detected    Parainfluenza Virus 2 Not Detected Not Detected    Parainfluenza Virus 3 Not Detected Not Detected    Parainfluenza Virus 4 Not Detected Not Detected    RSV, PCR Not Detected Not Detected    Bordetella pertussis pcr Not Detected Not Detected    Bordetella parapertussis PCR Not Detected Not Detected    Chlamydophila pneumoniae PCR Not Detected Not Detected    Mycoplasma pneumo by PCR Not Detected Not Detected   POC Creatinine    Collection Time: 06/12/24 10:00 AM    Specimen: Blood   Result Value Ref Range    Creatinine 0.60 0.60 - 1.30 mg/dL     Note: In addition to lab results from this visit, the labs listed above may include labs taken at another facility or during a different encounter within the last 24 hours. Please correlate lab times with ED admission and discharge times for further clarification of the services performed during this visit.    CT Angiogram Chest   Final Result      1. No evidence of pulmonary embolism   2. Endobronchial secretions in the lower lobes, correlate for bronchitis. Atelectasis present in the lower lobes with no evidence of pneumonia. Mildly enlarged bilateral hilar lymph nodes are likely reactive                  Electronically Signed: Sim Flores     6/12/2024 12:05 PM EDT     Workstation ID: [id number]        Vitals:    06/12/24 0911 06/12/24 1030 06/12/24 1102 06/12/24 1200   BP: 137/85 132/79 126/73 136/79   BP Location: Left arm      Patient Position: Sitting      Pulse: 74 76 77 72   Resp: 20      Temp: 97.9 °F (36.6 °C)      TempSrc: Oral      SpO2: 96% 94% 94% 95%   Weight: 89.4 kg (197 lb)      Height: 165.1 cm (65\")        Medications   ipratropium-albuterol (DUO-NEB) nebulizer solution 3 mL (3 mL Nebulization Given 6/12/24 1022)   iopamidol (ISOVUE-370) 76 % injection 100 mL (90 mL Intravenous Given 6/12/24 1129)     ECG/EMG Results (last 24 hours)       ** No " results found for the last 24 hours. **          ECG 12 Lead ED Triage Standing Order; SOA   Final Result   Test Reason : ED Triage Standing Order~   Blood Pressure :   */*   mmHG   Vent. Rate :  72 BPM     Atrial Rate :  72 BPM      P-R Int : 156 ms          QRS Dur :  90 ms       QT Int : 410 ms       P-R-T Axes :   9   7  15 degrees      QTc Int : 448 ms      Normal sinus rhythm   Normal ECG   No previous ECGs available   Confirmed by MD Vazquez Michael [id number]) on 6/12/2024 1:01:53 PM      Referred By: ED MD           Confirmed By: Adrian Vazquez MD               Recent Results (from the past 24 hour(s))   ECG 12 Lead ED Triage Standing Order; SOA    Collection Time: 06/12/24  9:16 AM   Result Value Ref Range    QT Interval 410 ms    QTC Interval 448 ms   Comprehensive Metabolic Panel    Collection Time: 06/12/24  9:55 AM    Specimen: Blood   Result Value Ref Range    Glucose 145 (H) 65 - 99 mg/dL    BUN 15 6 - 20 mg/dL    Creatinine 0.69 0.57 - 1.00 mg/dL    Sodium 138 136 - 145 mmol/L    Potassium 3.9 3.5 - 5.2 mmol/L    Chloride 103 98 - 107 mmol/L    CO2 23.0 22.0 - 29.0 mmol/L    Calcium 9.0 8.6 - 10.5 mg/dL    Total Protein 6.8 6.0 - 8.5 g/dL    Albumin 3.7 3.5 - 5.2 g/dL    ALT (SGPT) 35 (H) 1 - 33 U/L    AST (SGOT) 11 1 - 32 U/L    Alkaline Phosphatase 33 (L) 39 - 117 U/L    Total Bilirubin 0.3 0.0 - 1.2 mg/dL    Globulin 3.1 gm/dL    A/G Ratio 1.2 g/dL    BUN/Creatinine Ratio 21.7 7.0 - 25.0    Anion Gap 12.0 5.0 - 15.0 mmol/L    eGFR 111.3 >60.0 mL/min/1.73   BNP    Collection Time: 06/12/24  9:55 AM    Specimen: Blood   Result Value Ref Range    proBNP 49.6 0.0 - 450.0 pg/mL   Single High Sensitivity Troponin T    Collection Time: 06/12/24  9:55 AM    Specimen: Blood   Result Value Ref Range    HS Troponin T <6 <14 ng/L   Green Top (Gel)    Collection Time: 06/12/24  9:55 AM   Result Value Ref Range    Extra Tube Hold for add-ons.    Lavender Top    Collection Time: 06/12/24  9:55 AM   Result Value Ref  Range    Extra Tube hold for add-on    Gold Top - SST    Collection Time: 06/12/24  9:55 AM   Result Value Ref Range    Extra Tube Hold for add-ons.    Gray Top    Collection Time: 06/12/24  9:55 AM   Result Value Ref Range    Extra Tube Hold for add-ons.    Light Blue Top    Collection Time: 06/12/24  9:55 AM   Result Value Ref Range    Extra Tube Hold for add-ons.    CBC Auto Differential    Collection Time: 06/12/24  9:55 AM    Specimen: Blood   Result Value Ref Range    WBC 24.17 (H) 3.40 - 10.80 10*3/mm3    RBC 4.10 3.77 - 5.28 10*6/mm3    Hemoglobin 13.2 12.0 - 15.9 g/dL    Hematocrit 40.0 34.0 - 46.6 %    MCV 97.6 (H) 79.0 - 97.0 fL    MCH 32.2 26.6 - 33.0 pg    MCHC 33.0 31.5 - 35.7 g/dL    RDW 13.3 12.3 - 15.4 %    RDW-SD 47.7 37.0 - 54.0 fl    MPV 9.6 6.0 - 12.0 fL    Platelets 386 140 - 450 10*3/mm3    Neutrophil % 85.9 (H) 42.7 - 76.0 %    Lymphocyte % 6.3 (L) 19.6 - 45.3 %    Monocyte % 5.2 5.0 - 12.0 %    Eosinophil % 0.0 (L) 0.3 - 6.2 %    Basophil % 0.2 0.0 - 1.5 %    Immature Grans % 2.4 (H) 0.0 - 0.5 %    Neutrophils, Absolute 20.74 (H) 1.70 - 7.00 10*3/mm3    Lymphocytes, Absolute 1.53 0.70 - 3.10 10*3/mm3    Monocytes, Absolute 1.26 (H) 0.10 - 0.90 10*3/mm3    Eosinophils, Absolute 0.00 0.00 - 0.40 10*3/mm3    Basophils, Absolute 0.06 0.00 - 0.20 10*3/mm3    Immature Grans, Absolute 0.58 (H) 0.00 - 0.05 10*3/mm3    nRBC 0.0 0.0 - 0.2 /100 WBC   Respiratory Panel PCR w/COVID-19(SARS-CoV-2) [facility]I/NENA/JONH/PAD/COR/PERRY In-House, NP Swab in UTM/VTM, 2 HR TAT - Swab, Nasopharynx    Collection Time: 06/12/24  9:56 AM    Specimen: Nasopharynx; Swab   Result Value Ref Range    ADENOVIRUS, PCR Not Detected Not Detected    Coronavirus 229E Not Detected Not Detected    Coronavirus HKU1 Not Detected Not Detected    Coronavirus NL63 Not Detected Not Detected    Coronavirus OC43 Not Detected Not Detected    COVID19 Not Detected Not Detected - Ref. Range    Human Metapneumovirus Not Detected Not Detected    Human  "Rhinovirus/Enterovirus Not Detected Not Detected    Influenza A PCR Not Detected Not Detected    Influenza B PCR Not Detected Not Detected    Parainfluenza Virus 1 Not Detected Not Detected    Parainfluenza Virus 2 Not Detected Not Detected    Parainfluenza Virus 3 Not Detected Not Detected    Parainfluenza Virus 4 Not Detected Not Detected    RSV, PCR Not Detected Not Detected    Bordetella pertussis pcr Not Detected Not Detected    Bordetella parapertussis PCR Not Detected Not Detected    Chlamydophila pneumoniae PCR Not Detected Not Detected    Mycoplasma pneumo by PCR Not Detected Not Detected   POC Creatinine    Collection Time: 06/12/24 10:00 AM    Specimen: Blood   Result Value Ref Range    Creatinine 0.60 0.60 - 1.30 mg/dL     Note: In addition to lab results from this visit, the labs listed above may include labs taken at another facility or during a different encounter within the last 24 hours. Please correlate lab times with ED admission and discharge times for further clarification of the services performed during this visit.    CT Angiogram Chest   Final Result      1. No evidence of pulmonary embolism   2. Endobronchial secretions in the lower lobes, correlate for bronchitis. Atelectasis present in the lower lobes with no evidence of pneumonia. Mildly enlarged bilateral hilar lymph nodes are likely reactive                  Electronically Signed: Sim Flores     6/12/2024 12:05 PM EDT     Workstation ID: [id number]        Vitals:    06/12/24 0911 06/12/24 1030 06/12/24 1102 06/12/24 1200   BP: 137/85 132/79 126/73 136/79   BP Location: Left arm      Patient Position: Sitting      Pulse: 74 76 77 72   Resp: 20      Temp: 97.9 °F (36.6 °C)      TempSrc: Oral      SpO2: 96% 94% 94% 95%   Weight: 89.4 kg (197 lb)      Height: 165.1 cm (65\")        Medications   ipratropium-albuterol (DUO-NEB) nebulizer solution 3 mL (3 mL Nebulization Given 6/12/24 1022)   iopamidol (ISOVUE-370) 76 % injection 100 mL (90 " mL Intravenous Given 6/12/24 1129)     ECG/EMG Results (last 24 hours)       ** No results found for the last 24 hours. **          ECG 12 Lead ED Triage Standing Order; SOA   Final Result   Test Reason : ED Triage Standing Order~   Blood Pressure :   */*   mmHG   Vent. Rate :  72 BPM     Atrial Rate :  72 BPM      P-R Int : 156 ms          QRS Dur :  90 ms       QT Int : 410 ms       P-R-T Axes :   9   7  15 degrees      QTc Int : 448 ms      Normal sinus rhythm   Normal ECG   No previous ECGs available   Confirmed by MD Vazquez Michael ([id number]) on 6/12/2024 1:01:53 PM      Referred By: ED MD           Confirmed By: Adrian Vazquez MD                 Medical Decision Making  Problems Addressed:  History of asthma: complicated acute illness or injury  Leukocytosis, unspecified type: complicated acute illness or injury  Shortness of breath: complicated acute illness or injury    Amount and/or Complexity of Data Reviewed  Labs: ordered.  Radiology: ordered.  ECG/medicine tests: ordered.    Risk  Prescription drug management.        Final diagnoses:   Shortness of breath   History of asthma   Leukocytosis, unspecified type   Bronchitis       ED Disposition  ED Disposition       ED Disposition   Discharge    Condition   Stable    Comment   --               Marianna Mckeon PA-C  [address] [facility] [address]  [phone]    In 1 week           Medication List        Changed      losartan 50 MG tablet  Commonly known as: COZAAR  Take 1 tablet by mouth Daily.  What changed: when to take this                 Rico Vazquez MD  06/12/24 7939

## 2024-07-09 ENCOUNTER — DISEASE STATE MANAGEMENT VISIT (OUTPATIENT)
Dept: PHARMACY | Facility: HOSPITAL | Age: 44
End: 2024-07-09
Payer: COMMERCIAL

## 2024-07-09 VITALS
BODY MASS INDEX: 35.82 KG/M2 | DIASTOLIC BLOOD PRESSURE: 92 MMHG | WEIGHT: 215 LBS | HEIGHT: 65 IN | HEART RATE: 73 BPM | SYSTOLIC BLOOD PRESSURE: 135 MMHG | OXYGEN SATURATION: 96 %

## 2024-07-09 DIAGNOSIS — Z23 NEED FOR PNEUMOCOCCAL VACCINE: ICD-10-CM

## 2024-07-09 DIAGNOSIS — J45.50 SEVERE PERSISTENT ASTHMA WITHOUT COMPLICATION: Primary | ICD-10-CM

## 2024-07-09 PROCEDURE — G0463 HOSPITAL OUTPT CLINIC VISIT: HCPCS | Performed by: NURSE PRACTITIONER

## 2024-07-09 PROCEDURE — 99213 OFFICE O/P EST LOW 20 MIN: CPT | Performed by: NURSE PRACTITIONER

## 2024-07-09 RX ORDER — ALBUTEROL SULFATE AND BUDESONIDE 90; 80 UG/1; UG/1
AEROSOL, METERED RESPIRATORY (INHALATION)
COMMUNITY
Start: 2024-06-10 | End: 2024-07-09 | Stop reason: SDUPTHER

## 2024-07-09 RX ORDER — PREDNISONE 20 MG/1
40 TABLET ORAL DAILY
Qty: 10 TABLET | Refills: 0 | Status: SHIPPED | OUTPATIENT
Start: 2024-07-09

## 2024-07-09 RX ORDER — ALBUTEROL SULFATE AND BUDESONIDE 90; 80 UG/1; UG/1
2 AEROSOL, METERED RESPIRATORY (INHALATION) EVERY 4 HOURS PRN
Qty: 10.7 G | Refills: 3 | Status: SHIPPED | OUTPATIENT
Start: 2024-07-09

## 2024-07-09 NOTE — PROGRESS NOTES
Follow Up Office Visit      Patient Name: Janell Terrell    Chief Complaint:  Asthma follow up      History of Present Illness: Janell Terrell is a 43 y.o. female who is here today for follow up of severe persistent asthma.  Symptoms had been uncontrolled over the past 2 months, requiring multiple rounds of steroids and visits to urgent care and the emergency department.  Currently, her exacerbation symptoms are improved and she was recently able to go on vacation and walked around [place] without much difficulty.  She has not currently requiring short acting beta agonist use.  She continues on Trelegy and Tezspire.  She reports that she feels better now than she has in a long time.    Subjective      Review of Systems:  Review of Systems   Constitutional:  Negative for fever and unexpected weight change.   Respiratory:  Positive for shortness of breath. Negative for cough and wheezing.    Cardiovascular:  Negative for chest pain and leg swelling.        Past Medical History:   Past Medical History:   Diagnosis Date    Allergic rhinitis     Anxiety and depression     situational;  was deployed    Asthma     Asthma, extrinsic Childood    Asthma, intrinsic     Childhood    Diabetes mellitus     gestational    GERD (gastroesophageal reflux disease)     Gestational diabetes     Hypertension     Had preeclampsia and CHTN since then    Migraines     Pneumonia Childhood       Past Surgical History:   Past Surgical History:   Procedure Laterality Date     SECTION       SECTION WITH TUBAL Bilateral 2018    Procedure:  SECTION REPEAT WITH TUBAL * 37 WKS - GEST. DIABETIC, ELEVATED BPS*;  Surgeon: Terrence Dutta MD;  Location: [facility] LABOR DELIVERY;  Service: Obstetrics/Gynecology    WISDOM TOOTH EXTRACTION         Family History:   Family History   Problem Relation Age of Onset    Uterine cancer Mother         possibly    Breast cancer Mother 40         Lumpectomy    Hypothyroidism Mother     Hypothyroidism Father     Asthma Sister     Hypothyroidism Sister     Asthma Sister     Diabetes Sister     Hypothyroidism Brother     Asthma Brother     Asthma Brother     Cancer Maternal Grandmother         Lung CA    Cancer Maternal Grandfather         Lung CA    Ovarian cancer Neg Hx     Colon cancer Neg Hx        Social History:   Social History     Socioeconomic History    Marital status:    Tobacco Use    Smoking status: Never     Passive exposure: Never    Smokeless tobacco: Never   Vaping Use    Vaping status: Never Used   Substance and Sexual Activity    Alcohol use: Yes     Comment: occasional    Drug use: No    Sexual activity: Yes     Partners: Male     Birth control/protection: Tubal ligation       Current Medications:     Current Outpatient Medications:     Airsupra 90-80 MCG/ACT aerosol, INHALE 2 PUFFS BY MOUTH EVERY 4 HOURS AS NEEDED FOR WHEEZING, SHORTNESS OF BREATH, OR COUGH., Disp: , Rfl:     albuterol (ACCUNEB) 1.25 MG/3ML nebulizer solution, Take 3 mL by nebulization Every 6 (Six) Hours As Needed for Wheezing or Shortness of Air., Disp: 90 mL, Rfl: 0    cetirizine (zyrTEC) 10 MG tablet, Take 1 tablet by mouth Daily., Disp: , Rfl:     clonazePAM (KlonoPIN) 0.5 MG tablet, Take 1 tablet by mouth As Needed., Disp: , Rfl:     flunisolide (NASALIDE) 25 MCG/ACT (0.025%) solution nasal spray, Inhale 2 sprays Every 12 (Twelve) Hours., Disp: , Rfl:     ipratropium (ATROVENT) 0.02 % nebulizer solution, Take 2.5 mL by nebulization 4 (Four) Times a Day As Needed for Wheezing or Shortness of Air., Disp: 2.5 mL, Rfl: 5    levalbuterol (XOPENEX HFA) 45 MCG/ACT inhaler, Inhale 2 puffs 4 (Four) Times a Day As Needed for Wheezing or Shortness of Air., Disp: 15 g, Rfl: 5    levalbuterol (XOPENEX) 1.25 MG/3ML nebulizer solution, Take 1 ampule by nebulization 4 (Four) Times a Day As Needed for Wheezing or Shortness of Air., Disp: 120 mL, Rfl: 5    losartan (COZAAR) 50  "MG tablet, Take 1 tablet by mouth Daily. (Patient taking differently: Take 1 tablet by mouth 2 (Two) Times a Day.), Disp: 60 tablet, Rfl: 0    montelukast (SINGULAIR) 10 MG tablet, Take 1 tablet by mouth Every Night., Disp: 30 tablet, Rfl: 5    omeprazole (priLOSEC) 40 MG capsule, Take 1 capsule by mouth Daily., Disp: 30 capsule, Rfl: 0    ondansetron (ZOFRAN) 4 MG tablet, Take 1 tablet by mouth 3 times a day., Disp: , Rfl:     ondansetron ODT (ZOFRAN-ODT) 4 MG disintegrating tablet, Place 1 tablet on the tongue Every 8 (Eight) Hours As Needed for Nausea or Vomiting., Disp: 9 tablet, Rfl: 0    Semaglutide, 1 MG/DOSE, (OZEMPIC) 2 MG/1.5ML solution pen-injector, Inject  under the skin into the appropriate area as directed 1 (One) Time Per Week., Disp: , Rfl:     sertraline (ZOLOFT) 50 MG tablet, Take 1 tablet by mouth Daily., Disp: , Rfl:     Tezepelumab-ekko (Tezspire) 210 MG/1.91ML solution auto-injector, Inject 1.91 mL under the skin into the appropriate area as directed Every 28 (Twenty-Eight) Days. **See note to pharmacy for copay card information**, Disp: 1.91 mL, Rfl: 11    Trelegy Ellipta 200-62.5-25 MCG/ACT inhaler, INHALE 1 PUFF BY MOUTH EVERY DAY, Disp: 60 each, Rfl: 0    benzonatate (TESSALON) 200 MG capsule, Take 1 capsule by mouth 3 (Three) Times a Day As Needed for Cough. (Patient not taking: Reported on 7/9/2024), Disp: 21 capsule, Rfl: 0    EPINEPHrine (EPIPEN) 0.3 MG/0.3ML solution auto-injector injection, INJECT SYRINGE INTO THE APPROPRIATE MUSCLE AS DIRECTED FOR ALLERGIC REACTION (Patient not taking: Reported on 7/9/2024), Disp: , Rfl:      Allergies:   Allergies   Allergen Reactions    Doxycycline      Chemical esophagitis      Avelox [Moxifloxacin] Rash    Zithromax [Azithromycin] Rash       Objective     Physical Exam:  Vital Signs:   Vitals:    07/09/24 1138   BP: 135/92   Pulse: 73   SpO2: 96%   Weight: 97.5 kg (215 lb)   Height: 165.1 cm (65\")     Body mass index is 35.78 kg/m².    Physical " muscle strength/gait, locomotion, and balance Exam  Vitals reviewed.   Constitutional:       General: She is not in acute distress.     Appearance: She is not toxic-appearing.   HENT:      Head: Normocephalic and atraumatic.      Mouth/Throat:      Mouth: Mucous membranes are moist.   Eyes:      Conjunctiva/sclera: Conjunctivae normal.   Cardiovascular:      Rate and Rhythm: Normal rate.      Heart sounds: Normal heart sounds.   Pulmonary:      Effort: Pulmonary effort is normal.      Breath sounds: Normal breath sounds.   Abdominal:      General: There is no distension.      Palpations: Abdomen is soft.   Musculoskeletal:         General: No swelling.      Cervical back: Neck supple.   Skin:     General: Skin is warm and dry.      Findings: No rash.   Neurological:      General: No focal deficit present.      Mental Status: She is alert and oriented to person, place, and time.   Psychiatric:         Mood and Affect: Mood normal.         Behavior: Behavior normal.       Results Review:   June 2024 CTA chest showed no evidence of pulmonary embolism. Endobronchial secretions in the lower lobes, correlate for bronchitis. Atelectasis present in the lower lobes with no evidence of pneumonia. Mildly enlarged bilateral hilar lymph nodes are likely reactive.    Assessment / Plan      Assessment/Plan:   Diagnoses and all orders for this visit:    1. Severe persistent asthma without complication (Primary)  -     predniSONE (DELTASONE) 20 MG tablet; Take 2 tablets by mouth Daily.  Dispense: 10 tablet; Refill: 0  -     Airsupra 90-80 MCG/ACT aerosol; Inhale 2 puffs Every 4 (Four) Hours As Needed (shortness of breath, cough, wheezing).  Dispense: 10.7 g; Refill: 3  Exacerbation symptoms now resolved, symptoms are currently at baseline.  Continue current inhaled regimen as well as Tezspire. We discussed the risk and benefits of inhaled corticosteroids. Patient instructed to take them on a regular basis as prescribed. Patient instructed to rinse their mouth out after each  use.  Will send in a course of prednisone for patient to have on hand in the event of a recurrent exacerbation.    2. Need for pneumococcal vaccine  -     Pneumococcal 20-Keeley Conj Vacc (PREVNAR-20) 0.5 ML suspension prefilled syringe vaccine; Inject 0.5 mL into the appropriate muscle as directed by prescriber 1 (One) Time for 1 dose.  Dispense: 0.5 mL; Refill: 0       Follow Up:   As previously scheduled  The patient was counseled on diagnostic results, risks and benefits of treatment options, risk factor modifications and the importance of treatment compliance. The patient was advised to contact the clinic with concerns or worsening symptoms.     GEGE Smith   Pulmonary Medicine [place]     This document has been electronically signed by GEGE Smith  July 9, 2024

## 2024-11-13 DIAGNOSIS — Z29.11 NEED FOR RSV VACCINATION: Primary | ICD-10-CM

## 2024-11-13 RX ORDER — RESPIRATORY SYNCYTIAL VIRUS VACCINE 120MCG/0.5
0.5 KIT INTRAMUSCULAR ONCE
Qty: 0.5 ML | Refills: 0 | Status: SHIPPED | OUTPATIENT
Start: 2024-11-13 | End: 2024-11-13

## 2024-11-26 ENCOUNTER — TELEPHONE (OUTPATIENT)
Dept: PHARMACY | Facility: HOSPITAL | Age: 44
End: 2024-11-26
Payer: COMMERCIAL

## 2024-11-26 NOTE — TELEPHONE ENCOUNTER
Pt called regarding Tezspire. Pt's daughter tested positive for Covid and she is not feeling that great either. I spoke with Trace Pulido and she advised that the patient hold her Tezspire until next Friday, December 6th. Pt was informed and verbalized understanding.    Pt also asked if we could call in a prescription for Paxlovid. Informed her that Linda wanted to look over her med list and make sure there were any interactions and would make a decision based on that.     Pt uses [facility] pharmacy.

## 2024-12-18 ENCOUNTER — TELEPHONE (OUTPATIENT)
Dept: OBSTETRICS AND GYNECOLOGY | Facility: CLINIC | Age: 44
End: 2024-12-18

## 2024-12-18 NOTE — TELEPHONE ENCOUNTER
Caller: Janell Terrell    Relationship: Self    Best call back number: [phone], OK TO [facility]       What orders are you requesting (i.e. lab or imaging): U/S    In what timeframe would the patient need to come in: ASAP    Where will you receive your lab/imaging services: OFFICE    Additional notes: PT NEEDING NEW ORDER PLACED FOR US WITH BIOPSY. ORDER WILL HAVE  BY THE TIME WE CAN GET HER IN THE OFFICE FOR APPOINTMENT

## 2024-12-26 ENCOUNTER — SPECIALTY PHARMACY (OUTPATIENT)
Dept: PHARMACY | Facility: HOSPITAL | Age: 44
End: 2024-12-26
Payer: COMMERCIAL

## 2024-12-26 NOTE — PROGRESS NOTES
Ambulatory Care Clinic  Specialty Pharmacy Patient Management Program  Asthma Reassessment     Janell Terrell is a 44 y.o. female with asthma seen by a pulmonology provider and enrolled in the Pulmonology Patient Management Program offered by [facility]. A follow-up outreach was conducted, including assessment of continued therapy appropriateness, medication adherence, and side effect incidence and management for Tezspire.     Ms. Terrell stated that she is doing well on Tezspire and her asthma symptoms have greatly improved since starting the medication. She has tried both Dupixent and Nucala in the past without success. She states that she does think that the medication is wearing off faster than she would like and she begins to develop asthma symptoms ~ 1 week prior to her next dose. She has an appointment with Linda Duran in clinic on 1/14/25 and will discuss this at that time. Overall, she is very happy with Tezspire and has only experienced slight joint pain which she stated is tolerable.     Changes to Insurance Coverage or Financial Support  No changes to insurance coverage      Relevant Past Medical History and Comorbidities  Relevant medical history and concomitant health conditions were discussed with the patient. The patient's chart has been reviewed for relevant past medical history and comorbid health conditions and updated as necessary.   Past Medical History:   Diagnosis Date    Allergic rhinitis     Anxiety and depression 2006    situational;  was deployed    Asthma     Asthma, extrinsic Childood    Asthma, intrinsic     Childhood    Diabetes mellitus     gestational    GERD (gastroesophageal reflux disease)     Gestational diabetes     Hypertension 2008    Had preeclampsia and CHTN since then    Migraines     Pneumonia Childhood     Social History     Socioeconomic History    Marital status:    Tobacco Use    Smoking status: Never     Passive  exposure: Never    Smokeless tobacco: Never   Vaping Use    Vaping status: Never Used   Substance and Sexual Activity    Alcohol use: Yes     Comment: occasional    Drug use: No    Sexual activity: Yes     Partners: Male     Birth control/protection: Tubal ligation     Problem list reviewed by Mirtha Rooney RPH on 12/26/2024 at  3:37 PM    Hospitalizations and Urgent Care Since Last Assessment  ED Visits, Admissions, or Hospitalizations: Yes- ED visit on 06/12/24 for bronchitis; seen by pulmonology 07/2024  and Tezspire continued   Urgent Office Visits: Yes- UC visit on  05/26/24 due to URI    Allergies  Known allergies and reactions were discussed with the patient. The patient's chart has been reviewed for allergy information and updated as necessary.   Allergies   Allergen Reactions    Doxycycline      Chemical esophagitis      Avelox [Moxifloxacin] Rash    Zithromax [Azithromycin] Rash     Allergies reviewed by Mirtha Rooney RPH on 12/26/2024 at  3:37 PM    Relevant Laboratory Values  Common labs          2/5/2024    09:15 6/12/2024    09:55 6/12/2024    10:00   Common Labs   Glucose 90  145     BUN 13  15     Creatinine 0.79  0.69  0.60    Sodium 135  138     Potassium 4.0  3.9     Chloride 101  103     Calcium 9.2  9.0     Albumin 4.3  3.7     Total Bilirubin 0.3  0.3     Alkaline Phosphatase 37  33     AST (SGOT) 18  11     ALT (SGPT) 23  35     WBC 9.53  24.17     Hemoglobin 14.6  13.2     Hematocrit 43.7  40.0     Platelets 349  386         Lab Assessment  The above labs have been reviewed. No dose adjustments are needed for the specialty medication(s) based on the labs.     Current Medication List  This medication list has been reviewed with the patient and evaluated for any interactions or necessary modifications/recommendations, and updated to include all prescription medications, OTC medications, and supplements the patient is currently taking.  This list reflects what is contained in the  patient's profile, which has also been marked as reviewed to communicate to other providers it is the most up to date version of the patient's current medication therapy.     Current Outpatient Medications:     Airsupra 90-80 MCG/ACT aerosol, Inhale 2 puffs Every 4 (Four) Hours As Needed (shortness of breath, cough, wheezing)., Disp: 10.7 g, Rfl: 3    albuterol (ACCUNEB) 1.25 MG/3ML nebulizer solution, Take 3 mL by nebulization Every 6 (Six) Hours As Needed for Wheezing or Shortness of Air., Disp: 90 mL, Rfl: 0    benzonatate (TESSALON) 200 MG capsule, Take 1 capsule by mouth 3 (Three) Times a Day As Needed for Cough. (Patient not taking: Reported on 7/9/2024), Disp: 21 capsule, Rfl: 0    cetirizine (zyrTEC) 10 MG tablet, Take 1 tablet by mouth Daily., Disp: , Rfl:     clonazePAM (KlonoPIN) 0.5 MG tablet, Take 1 tablet by mouth As Needed., Disp: , Rfl:     EPINEPHrine (EPIPEN) 0.3 MG/0.3ML solution auto-injector injection, INJECT SYRINGE INTO THE APPROPRIATE MUSCLE AS DIRECTED FOR ALLERGIC REACTION (Patient not taking: Reported on 7/9/2024), Disp: , Rfl:     flunisolide (NASALIDE) 25 MCG/ACT (0.025%) solution nasal spray, Inhale 2 sprays Every 12 (Twelve) Hours., Disp: , Rfl:     ipratropium (ATROVENT) 0.02 % nebulizer solution, Take 2.5 mL by nebulization 4 (Four) Times a Day As Needed for Wheezing or Shortness of Air., Disp: 2.5 mL, Rfl: 5    levalbuterol (XOPENEX HFA) 45 MCG/ACT inhaler, Inhale 2 puffs 4 (Four) Times a Day As Needed for Wheezing or Shortness of Air., Disp: 15 g, Rfl: 5    levalbuterol (XOPENEX) 1.25 MG/3ML nebulizer solution, Take 1 ampule by nebulization 4 (Four) Times a Day As Needed for Wheezing or Shortness of Air., Disp: 120 mL, Rfl: 5    losartan (COZAAR) 50 MG tablet, Take 1 tablet by mouth Daily. (Patient taking differently: Take 1 tablet by mouth 2 (Two) Times a Day.), Disp: 60 tablet, Rfl: 0    montelukast (SINGULAIR) 10 MG tablet, Take 1 tablet by mouth Every Night., Disp: 30 tablet,  Rfl: 5    Nirmatrelvir & Ritonavir, 300mg/100mg, (PAXLOVID), Take 3 tablets by mouth 2 (Two) Times a Day., Disp: 30 tablet, Rfl: 0    omeprazole (priLOSEC) 40 MG capsule, Take 1 capsule by mouth Daily., Disp: 30 capsule, Rfl: 0    ondansetron (ZOFRAN) 4 MG tablet, Take 1 tablet by mouth 3 times a day., Disp: , Rfl:     ondansetron ODT (ZOFRAN-ODT) 4 MG disintegrating tablet, Place 1 tablet on the tongue Every 8 (Eight) Hours As Needed for Nausea or Vomiting., Disp: 9 tablet, Rfl: 0    predniSONE (DELTASONE) 20 MG tablet, Take 2 tablets by mouth Daily., Disp: 10 tablet, Rfl: 0    Semaglutide, 1 MG/DOSE, (OZEMPIC) 2 MG/1.5ML solution pen-injector, Inject  under the skin into the appropriate area as directed 1 (One) Time Per Week., Disp: , Rfl:     sertraline (ZOLOFT) 50 MG tablet, Take 1 tablet by mouth Daily., Disp: , Rfl:     Tezepelumab-ekko (Tezspire) 210 MG/1.91ML solution auto-injector, Inject 1.91 mL under the skin into the appropriate area as directed Every 28 (Twenty-Eight) Days. **See note to pharmacy for copay card information**, Disp: 1.91 mL, Rfl: 11    Trelegy Ellipta 200-62.5-25 MCG/ACT inhaler, INHALE 1 PUFF BY MOUTH EVERY DAY, Disp: 60 each, Rfl: 0         Drug Interactions  No drug interactions noted with Tezspire therapy      Adverse Drug Reactions  Medication tolerability: Tolerating with no to minimal ADRs          Medication plan: Continue therapy with normal follow-up  Plan for ADR Management: N/A; Patient reports slight joint aches with therapy but nothing intolerable      Adherence, Self-Administration, and Current Therapy Problems  Adherence related patient's specialty therapy was discussed with the patient. The Adherence segment of this outreach has been reviewed and updated.   Adherence Questions  Linked Medication(s) Assessed: Tezepelumab-ekko (Tezspire)  On average, how many doses/injections does the patient miss per month?: 0  What are the identified reasons for non-adherence or missed  doses? : no problems identified  What is the estimated medication adherence level?: %  Based on the patient/caregiver response and refill history, does this patient require an MTP to track adherence improvements?: no    Additional Barriers to Patient Self-Administration: None   Methods for Supporting Patient Self-Administration: N/A    Recently Close Medication Therapy Problems  No medication therapy recommendations to display  Open Medication Therapy Problems  No medication therapy recommendations to display     Goals of Therapy  Goals related to the patient's specialty therapy was discussed with the patient. The Patient Goals segment of this outreach has been reviewed and updated.    Goals Addressed Today        Specialty Pharmacy General Goal      Decrease in asthma symptoms to <3x per year               Quality of Life Assessment   Quality of Life related to the patient's enrollment in the patient management program and services provided was discussed with the patient. The QOL segment of this outreach has been reviewed and updated.   Quality of Life Improvement Scale: 10-Significantly better    Reassessment Plan & Follow-Up  Medication Therapy Changes: No changes made to therapy   Related Plans, Therapy Recommendations, or Issues to Be Addressed: None   Pharmacist to perform regular reassessments no more than (6) months from the previous assessment.  Care Coordinator to set up future refill outreaches, coordinate prescription delivery, and escalate clinical questions to pharmacist.     Attestation  Therapeutic appropriateness: Appropriate  I attest the patient was actively involved in and has agreed to the above plan of care. If the prescribed therapy is at any point deemed not appropriate based on the current or future assessments, a consultation will be initiated with the patient's specialty care provider to determine the best course of action. The revised plan of therapy will be documented along with  any additional patient education provided. Discussed aforementioned material with patient by phone.    Mirtha Rooney, PharmD  12/26/2024   15:43 EST

## 2025-01-14 ENCOUNTER — DISEASE STATE MANAGEMENT VISIT (OUTPATIENT)
Dept: PHARMACY | Facility: HOSPITAL | Age: 45
End: 2025-01-14
Payer: COMMERCIAL

## 2025-01-14 VITALS
OXYGEN SATURATION: 97 % | HEIGHT: 65 IN | BODY MASS INDEX: 36.99 KG/M2 | HEART RATE: 70 BPM | WEIGHT: 222 LBS | SYSTOLIC BLOOD PRESSURE: 138 MMHG | DIASTOLIC BLOOD PRESSURE: 85 MMHG

## 2025-01-14 DIAGNOSIS — J45.50 SEVERE PERSISTENT ASTHMA WITHOUT COMPLICATION: Primary | ICD-10-CM

## 2025-01-14 PROCEDURE — 99213 OFFICE O/P EST LOW 20 MIN: CPT | Performed by: NURSE PRACTITIONER

## 2025-01-14 NOTE — PROGRESS NOTES
Follow Up Office Visit      Patient Name: Janell Terrell    Chief Complaint:  Asthma      History of Present Illness: Janell Terrell is a 44 y.o. female who is here today for follow up of asthma. Since last visit, she has continued to have improved symptoms with use of Tezspire. She does have some joint pain as a side effect of the medication but she is able to tolerate this considering that it allows her to breathe better. Her symptoms do seem to worsen half way through the month and she will use her rescue inhalers more often during that time until she is due for her next biologic injection.  She was previously on Dupixent and then Nucala, though she feels that Tezspire has resulted in the best clinical response for her.  She has not required any urgent care or ER visits. She does have some prednisone at home to use if needed. Family members in her home have had URI symptoms recently. She is compliant with use of Trelegy and Singulair.    Associated Symptoms: Dyspnea, cough productive of sputum, no current wheezing, no fevers, no hemoptysis, no unintended weight loss  Modifying factors: Worse with exertion and cold weather.  Improves with rest, inhalers, test fire.  Supplemental Oxygen: No  Last Steroids: November 2024    Subjective      Review of Systems:  Review of Systems   Constitutional:  Negative for fever and unexpected weight change.   Respiratory:  Positive for cough and shortness of breath. Negative for wheezing.    Cardiovascular:  Negative for chest pain.        Past Medical History:   Past Medical History:   Diagnosis Date    Allergic rhinitis     Anxiety and depression 2006    situational;  was deployed    Asthma     Asthma, extrinsic Childood    Asthma, intrinsic     Childhood    Diabetes mellitus     gestational    GERD (gastroesophageal reflux disease)     Gestational diabetes     Hypertension 2008    Had preeclampsia and CHTN since then    Migraines     Pneumonia Childhood        Past Surgical History:   Past Surgical History:   Procedure Laterality Date     SECTION       SECTION WITH TUBAL Bilateral 2018    Procedure:  SECTION REPEAT WITH TUBAL * 37 WKS - GEST. DIABETIC, ELEVATED BPS*;  Surgeon: Terrence Dutta MD;  Location: [facility] LABOR DELIVERY;  Service: Obstetrics/Gynecology    WISDOM TOOTH EXTRACTION         Family History:   Family History   Problem Relation Age of Onset    Uterine cancer Mother         possibly    Breast cancer Mother 40        Lumpectomy    Hypothyroidism Mother     Hypothyroidism Father     Asthma Sister     Hypothyroidism Sister     Asthma Sister     Diabetes Sister     Hypothyroidism Brother     Asthma Brother     Asthma Brother     Cancer Maternal Grandmother         Lung CA    Cancer Maternal Grandfather         Lung CA    Ovarian cancer Neg Hx     Colon cancer Neg Hx        Social History:   Social History     Socioeconomic History    Marital status:    Tobacco Use    Smoking status: Never     Passive exposure: Past    Smokeless tobacco: Never    Tobacco comments:     Mother smoke home when patient was younger   Vaping Use    Vaping status: Never Used   Substance and Sexual Activity    Alcohol use: Yes     Comment: occasional    Drug use: No    Sexual activity: Yes     Partners: Male     Birth control/protection: Tubal ligation       Current Medications:     Current Outpatient Medications:     Airsupra 90-80 MCG/ACT aerosol, Inhale 2 puffs Every 4 (Four) Hours As Needed (shortness of breath, cough, wheezing)., Disp: 10.7 g, Rfl: 3    albuterol (ACCUNEB) 1.25 MG/3ML nebulizer solution, Take 3 mL by nebulization Every 6 (Six) Hours As Needed for Wheezing or Shortness of Air., Disp: 90 mL, Rfl: 0    cetirizine (zyrTEC) 10 MG tablet, Take 1 tablet by mouth Daily., Disp: , Rfl:     clonazePAM (KlonoPIN) 0.5 MG tablet, Take 1 tablet by mouth As Needed., Disp: , Rfl:     EPINEPHrine (EPIPEN) 0.3 MG/0.3ML  solution auto-injector injection, , Disp: , Rfl:     flunisolide (NASALIDE) 25 MCG/ACT (0.025%) solution nasal spray, Inhale 2 sprays Every 12 (Twelve) Hours., Disp: , Rfl:     ipratropium (ATROVENT) 0.02 % nebulizer solution, Take 2.5 mL by nebulization 4 (Four) Times a Day As Needed for Wheezing or Shortness of Air., Disp: 2.5 mL, Rfl: 5    levalbuterol (XOPENEX HFA) 45 MCG/ACT inhaler, Inhale 2 puffs 4 (Four) Times a Day As Needed for Wheezing or Shortness of Air., Disp: 15 g, Rfl: 5    losartan (COZAAR) 50 MG tablet, Take 1 tablet by mouth Daily. (Patient taking differently: Take 1 tablet by mouth 2 (Two) Times a Day.), Disp: 60 tablet, Rfl: 0    montelukast (SINGULAIR) 10 MG tablet, Take 1 tablet by mouth Every Night., Disp: 30 tablet, Rfl: 5    omeprazole (priLOSEC) 40 MG capsule, Take 1 capsule by mouth Daily., Disp: 30 capsule, Rfl: 0    Semaglutide, 1 MG/DOSE, (OZEMPIC) 2 MG/1.5ML solution pen-injector, Inject  under the skin into the appropriate area as directed 1 (One) Time Per Week., Disp: , Rfl:     sertraline (ZOLOFT) 50 MG tablet, Take 1 tablet by mouth Daily., Disp: , Rfl:     Tezepelumab-ekko (Tezspire) 210 MG/1.91ML solution auto-injector, Inject 1.91 mL under the skin into the appropriate area as directed Every 28 (Twenty-Eight) Days. **See note to pharmacy for copay card information**, Disp: 1.91 mL, Rfl: 11    Trelegy Ellipta 200-62.5-25 MCG/ACT inhaler, INHALE 1 PUFF BY MOUTH EVERY DAY, Disp: 60 each, Rfl: 0    levalbuterol (XOPENEX) 1.25 MG/3ML nebulizer solution, Take 1 ampule by nebulization 4 (Four) Times a Day As Needed for Wheezing or Shortness of Air. (Patient not taking: Reported on 1/14/2025), Disp: 120 mL, Rfl: 5    ondansetron ODT (ZOFRAN-ODT) 4 MG disintegrating tablet, Place 1 tablet on the tongue Every 8 (Eight) Hours As Needed for Nausea or Vomiting. (Patient not taking: Reported on 1/14/2025), Disp: 9 tablet, Rfl: 0    predniSONE (DELTASONE) 20 MG tablet, Take 2 tablets by mouth  "Daily. (Patient not taking: Reported on 1/14/2025), Disp: 10 tablet, Rfl: 0     Allergies:   Allergies   Allergen Reactions    Doxycycline      Chemical esophagitis      Avelox [Moxifloxacin] Rash    Zithromax [Azithromycin] Rash       Objective     Physical Exam:  Vital Signs:   Vitals:    01/14/25 1004   BP: 138/85   Pulse: 70   SpO2: 97%   Weight: 101 kg (222 lb)   Height: 165.1 cm (65\")     Body mass index is 36.94 kg/m².    Physical Exam  Vitals reviewed.   Constitutional:       General: She is not in acute distress.     Appearance: She is not toxic-appearing.   HENT:      Head: Normocephalic and atraumatic.      Mouth/Throat:      Mouth: Mucous membranes are moist.   Eyes:      Extraocular Movements: Extraocular movements intact.      Conjunctiva/sclera: Conjunctivae normal.   Cardiovascular:      Rate and Rhythm: Normal rate.      Heart sounds: Normal heart sounds.   Pulmonary:      Effort: Pulmonary effort is normal.      Breath sounds: Normal breath sounds.   Abdominal:      General: There is no distension.      Palpations: Abdomen is soft.   Musculoskeletal:         General: No swelling.      Cervical back: Neck supple.   Skin:     General: Skin is warm and dry.      Findings: No rash.   Neurological:      General: No focal deficit present.      Mental Status: She is alert and oriented to person, place, and time.   Psychiatric:         Mood and Affect: Mood normal.         Behavior: Behavior normal.       Assessment / Plan      Assessment/Plan:   Diagnoses and all orders for this visit:    1. Severe persistent asthma without complication (Primary)  -     amoxicillin-clavulanate (AUGMENTIN) 875-125 MG per tablet; Take 1 tablet by mouth 2 (Two) Times a Day. Take with food or milk  Dispense: 14 tablet; Refill: 0  Improved control on current medication regimen.  Less frequent exacerbations with biologic use.  Continue current inhaled regimen and Tezspire injections as prescribed. We discussed the risk and " benefits of inhaled corticosteroids. Patient instructed to take them on a regular basis as prescribed. Patient instructed to rinse their mouth out after each use.  Will send in a course of Augmentin for her to have on hand in the event of an exacerbation, which she can use along with prednisone if needed.     Follow Up:   Return in about 6 months (around 7/14/2025) for Recheck.  The patient was counseled on diagnostic results, risks and benefits of treatment options, risk factor modifications and the importance of treatment compliance. The patient was advised to contact the clinic with concerns or worsening symptoms.     GEGE Smith   Pulmonary Medicine [place]     This document has been electronically signed by GEGE Smith  January 14, 2025

## 2025-02-20 ENCOUNTER — OFFICE VISIT (OUTPATIENT)
Dept: OBSTETRICS AND GYNECOLOGY | Facility: CLINIC | Age: 45
End: 2025-02-20
Payer: COMMERCIAL

## 2025-02-20 ENCOUNTER — TRANSCRIBE ORDERS (OUTPATIENT)
Dept: ADMINISTRATIVE | Facility: HOSPITAL | Age: 45
End: 2025-02-20
Payer: COMMERCIAL

## 2025-02-20 ENCOUNTER — TELEPHONE (OUTPATIENT)
Dept: OBSTETRICS AND GYNECOLOGY | Facility: CLINIC | Age: 45
End: 2025-02-20

## 2025-02-20 VITALS
WEIGHT: 223.8 LBS | BODY MASS INDEX: 37.29 KG/M2 | HEIGHT: 65 IN | SYSTOLIC BLOOD PRESSURE: 120 MMHG | DIASTOLIC BLOOD PRESSURE: 82 MMHG

## 2025-02-20 DIAGNOSIS — Z01.419 WOMEN'S ANNUAL ROUTINE GYNECOLOGICAL EXAMINATION: Primary | ICD-10-CM

## 2025-02-20 DIAGNOSIS — Z12.31 OTHER SCREENING MAMMOGRAM: Primary | ICD-10-CM

## 2025-02-20 DIAGNOSIS — N94.9 VAGINAL DISCOMFORT: ICD-10-CM

## 2025-02-20 DIAGNOSIS — N95.1 PERIMENOPAUSAL SYMPTOMS: ICD-10-CM

## 2025-02-20 LAB
25(OH)D3+25(OH)D2 SERPL-MCNC: 38.8 NG/ML (ref 30–100)
ALBUMIN SERPL-MCNC: 4.4 G/DL (ref 3.5–5.2)
ALBUMIN/GLOB SERPL: 1.8 G/DL
ALP SERPL-CCNC: 29 U/L (ref 39–117)
ALT SERPL-CCNC: 56 U/L (ref 1–33)
AST SERPL-CCNC: 29 U/L (ref 1–32)
BILIRUB SERPL-MCNC: 0.3 MG/DL (ref 0–1.2)
BUN SERPL-MCNC: 11 MG/DL (ref 6–20)
BUN/CREAT SERPL: 12.8 (ref 7–25)
CALCIUM SERPL-MCNC: 9.6 MG/DL (ref 8.6–10.5)
CHLORIDE SERPL-SCNC: 103 MMOL/L (ref 98–107)
CHOLEST SERPL-MCNC: 194 MG/DL (ref 0–200)
CO2 SERPL-SCNC: 22.1 MMOL/L (ref 22–29)
CREAT SERPL-MCNC: 0.86 MG/DL (ref 0.57–1)
EGFRCR SERPLBLD CKD-EPI 2021: 86.1 ML/MIN/1.73
ERYTHROCYTE [DISTWIDTH] IN BLOOD BY AUTOMATED COUNT: 12 % (ref 12.3–15.4)
GLOBULIN SER CALC-MCNC: 2.5 GM/DL
GLUCOSE SERPL-MCNC: 83 MG/DL (ref 65–99)
HCT VFR BLD AUTO: 41.9 % (ref 34–46.6)
HDLC SERPL-MCNC: 42 MG/DL (ref 40–60)
HGB BLD-MCNC: 14.7 G/DL (ref 12–15.9)
LDLC SERPL CALC-MCNC: 127 MG/DL (ref 0–100)
MCH RBC QN AUTO: 33.2 PG (ref 26.6–33)
MCHC RBC AUTO-ENTMCNC: 35.1 G/DL (ref 31.5–35.7)
MCV RBC AUTO: 94.6 FL (ref 79–97)
PLATELET # BLD AUTO: 386 10*3/MM3 (ref 140–450)
POTASSIUM SERPL-SCNC: 4.3 MMOL/L (ref 3.5–5.2)
PROT SERPL-MCNC: 6.9 G/DL (ref 6–8.5)
RBC # BLD AUTO: 4.43 10*6/MM3 (ref 3.77–5.28)
SODIUM SERPL-SCNC: 141 MMOL/L (ref 136–145)
TRIGL SERPL-MCNC: 138 MG/DL (ref 0–150)
TSH SERPL DL<=0.005 MIU/L-ACNC: 1.97 UIU/ML (ref 0.27–4.2)
VLDLC SERPL CALC-MCNC: 25 MG/DL (ref 5–40)
WBC # BLD AUTO: 9.12 10*3/MM3 (ref 3.4–10.8)

## 2025-02-20 PROCEDURE — 99396 PREV VISIT EST AGE 40-64: CPT | Performed by: NURSE PRACTITIONER

## 2025-02-20 NOTE — PROGRESS NOTES
Gynecologic Annual Exam Note          GYN Annual Exam     annual (Vaginal discomfort, perimenopausal symptoms)        Subjective     HPI  Janell Terrell is a 44 y.o. female, , who presents for annual well woman exam as a established patient. There were no changes to her medical or surgical history since her last visit..  Patient's last menstrual period was 2025 (approximate).  Her periods occur every 25-35 days, lasting 5-8 days.  The flow is heavy for the first few days, requiring her to change a super tampon every hour. She reports dysmenorrhea is severe. She has discussed having a uterine ablation with Dr. Bailey in the past for heavy bleeding. She thinks she would like to go ahead now and have this done.    Marital Status: . She is sexually active. She has not had new partners. STD testing recommendations have been explained to the patient and she declines STD testing.    The patient would like to discuss the following complaints today: Patient reports having feelings of vaginal discomfort x 1 month.  She describes as pressure, similar to feeling as if she has a tampon misplaced.  She denies abnormal vaginal discharge, odor, itching or irritation.    She also reports issues with the following:  hot flashes, hair loss, night sweats and memory loss.  She reports that she has been having these symptoms x 2 years.  She has been taking estroven, and it has been helping with the hot flashes/night sweats. She uses hair loss treatment. She takes daily vitamin D.       Additional OB/GYN History   contraceptive methods: Tubal ligation  Desires to: do not start contraception  History of migraines: yes with aura    Last Pap : 2021. Result: negative. HPV: negative.   Last Completed Pap Smear       This patient has no relevant Health Maintenance data.          History of abnormal Pap smear: no  Family history of uterine, colon, breast, or ovarian cancer: yes - Mother- Breast cancer and possibly  uterine  Performs monthly Self-Breast Exam: no  Last mammogram: 2/29/2024. Done at [facility]. There is a copy in the chart.    Last Completed Mammogram            MAMMOGRAM (Every 2 Years) Next due on 2/28/2026 02/29/2024  Mammo Screening Digital Tomosynthesis Bilateral With CAD    05/24/2022  Mammo Screening Digital Tomosynthesis Bilateral With CAD                    Colonoscopy: has never had a colonoscopy or cologuard  Exercises Regularly: yes  Feelings of Anxiety or Depression: no  Tobacco Usage?: No       Current Outpatient Medications:     Airsupra 90-80 MCG/ACT aerosol, Inhale 2 puffs Every 4 (Four) Hours As Needed (shortness of breath, cough, wheezing)., Disp: 10.7 g, Rfl: 3    albuterol (ACCUNEB) 1.25 MG/3ML nebulizer solution, Take 3 mL by nebulization Every 6 (Six) Hours As Needed for Wheezing or Shortness of Air., Disp: 90 mL, Rfl: 0    amoxicillin-clavulanate (AUGMENTIN) 875-125 MG per tablet, Take 1 tablet by mouth 2 (Two) Times a Day. Take with food or milk, Disp: 14 tablet, Rfl: 0    cetirizine (zyrTEC) 10 MG tablet, Take 1 tablet by mouth Daily., Disp: , Rfl:     clonazePAM (KlonoPIN) 0.5 MG tablet, Take 1 tablet by mouth As Needed., Disp: , Rfl:     EPINEPHrine (EPIPEN) 0.3 MG/0.3ML solution auto-injector injection, , Disp: , Rfl:     flunisolide (NASALIDE) 25 MCG/ACT (0.025%) solution nasal spray, Inhale 2 sprays Every 12 (Twelve) Hours., Disp: , Rfl:     ipratropium (ATROVENT) 0.02 % nebulizer solution, Take 2.5 mL by nebulization 4 (Four) Times a Day As Needed for Wheezing or Shortness of Air., Disp: 2.5 mL, Rfl: 5    levalbuterol (XOPENEX HFA) 45 MCG/ACT inhaler, Inhale 2 puffs 4 (Four) Times a Day As Needed for Wheezing or Shortness of Air., Disp: 15 g, Rfl: 5    montelukast (SINGULAIR) 10 MG tablet, Take 1 tablet by mouth Every Night., Disp: 30 tablet, Rfl: 5    omeprazole (priLOSEC) 40 MG capsule, Take 1 capsule by mouth Daily., Disp: 30 capsule, Rfl: 0    Semaglutide, 1 MG/DOSE,  (OZEMPIC) 2 MG/1.5ML solution pen-injector, Inject  under the skin into the appropriate area as directed 1 (One) Time Per Week., Disp: , Rfl:     sertraline (ZOLOFT) 50 MG tablet, Take 1 tablet by mouth Daily., Disp: , Rfl:     Tezepelumab-ekko (Tezspire) 210 MG/1.91ML solution auto-injector, Inject 1.91 mL under the skin into the appropriate area as directed Every 28 (Twenty-Eight) Days. **See note to pharmacy for copay card information**, Disp: 1.91 mL, Rfl: 11    Trelegy Ellipta 200-62.5-25 MCG/ACT inhaler, INHALE 1 PUFF BY MOUTH EVERY DAY, Disp: 60 each, Rfl: 0     Patient denies the need for medication refills today.    OB History          2    Para   2    Term   1       1    AB   0    Living   2         SAB   0    IAB   0    Ectopic   0    Molar   0    Multiple   0    Live Births   2                Past Medical History:   Diagnosis Date    Allergic rhinitis     Anxiety and depression     situational;  was deployed    Asthma     Asthma, extrinsic Childood    Asthma, intrinsic     Childhood    Diabetes mellitus     gestational    GERD (gastroesophageal reflux disease)     Gestational diabetes     Hypertension     Had preeclampsia and CHTN since then    Migraines     Pneumonia Childhood        Past Surgical History:   Procedure Laterality Date     SECTION  2008     SECTION WITH TUBAL Bilateral 2018    Procedure:  SECTION REPEAT WITH TUBAL * 37 WKS - GEST. DIABETIC, ELEVATED BPS*;  Surgeon: Terrence Dutta MD;  Location: [facility] LABOR DELIVERY;  Service: Obstetrics/Gynecology    WISDOM TOOTH EXTRACTION         Health Maintenance   Topic Date Due    HEPATITIS C SCREENING  Never done    ANNUAL PHYSICAL  Never done    TDAP/TD VACCINES (2 - Td or Tdap) 2019    BMI FOLLOWUP  2024    PAP SMEAR  2024    COVID-19 Vaccine ( season) 2024    Annual Gynecologic Pelvic and Breast Exam  2025    MAMMOGRAM  2026     "Pneumococcal Vaccine 0-49  Completed    INFLUENZA VACCINE  Completed       The additional following portions of the patient's history were reviewed and updated as appropriate: allergies, current medications, past family history, past medical history, past social history, past surgical history, and problem list.    Review of Systems   Constitutional: Negative.    HENT: Negative.     Eyes: Negative.    Respiratory: Negative.     Cardiovascular: Negative.    Gastrointestinal: Negative.    Endocrine: Negative.         Hot flashes and night sweats     Genitourinary:  Positive for menstrual problem (heavy, painful) and vaginal pain (discomfort).   Musculoskeletal: Negative.    Skin: Negative.    Allergic/Immunologic: Negative.    Neurological:  Positive for memory problem.   Hematological: Negative.    Psychiatric/Behavioral: Negative.           I have reviewed and agree with the HPI, ROS, and historical information as entered above. Caty Demarco, APRN          Objective   /82   Ht 165.1 cm (65\")   Wt 102 kg (223 lb 12.8 oz)   LMP 02/06/2025 (Approximate)   BMI 37.24 kg/m²     Physical Exam  Vitals and nursing note reviewed. Exam conducted with a chaperone present.   Constitutional:       Appearance: Normal appearance. She is well-developed.   HENT:      Head: Normocephalic and atraumatic.   Neck:      Thyroid: No thyroid mass or thyromegaly.   Cardiovascular:      Rate and Rhythm: Normal rate.      Heart sounds: No murmur heard.  Pulmonary:      Effort: Pulmonary effort is normal. No retractions.      Breath sounds: No wheezing, rhonchi or rales.   Chest:      Chest wall: No mass or tenderness.   Breasts:     Right: Normal. No mass, nipple discharge, skin change or tenderness.      Left: Normal. No mass, nipple discharge, skin change or tenderness.   Abdominal:      Palpations: Abdomen is soft. Abdomen is not rigid. There is no mass.      Tenderness: There is no abdominal tenderness. There is no " guarding.      Hernia: No hernia is present.   Genitourinary:     General: Normal vulva.      Exam position: Lithotomy position.      Labia:         Right: No rash, tenderness or lesion.         Left: No rash, tenderness or lesion.       Vagina: Normal. No foreign body. No vaginal discharge, lesions or prolapsed vaginal walls.      Cervix: Normal. No cervical motion tenderness, discharge, lesion or cervical bleeding.      Uterus: Normal. Not enlarged, not fixed and not tender.       Adnexa: Right adnexa normal and left adnexa normal.        Right: No mass or tenderness.          Left: No mass or tenderness.        Rectum: Normal. No external hemorrhoid.   Musculoskeletal:      Cervical back: Normal range of motion. No muscular tenderness.   Neurological:      Mental Status: She is alert and oriented to person, place, and time.   Psychiatric:         Behavior: Behavior normal.          Assessment and Plan    Problem List Items Addressed This Visit    None  Visit Diagnoses       Women's annual routine gynecological examination    -  Primary    Relevant Orders    LIQUID-BASED PAP SMEAR WITH HPV GENOTYPING REGARDLESS OF INTERPRETATION (PERRY,COR,MAD)    Estradiol    FSH & LH    TSH Rfx On Abnormal To Free T4    CBC (No Diff)    Comprehensive Metabolic Panel    Vitamin D,25-Hydroxy    Lipid Panel    Vaginal discomfort        Relevant Orders    LIQUID-BASED PAP SMEAR WITH HPV GENOTYPING REGARDLESS OF INTERPRETATION (PERRY,COR,MAD)    Perimenopausal symptoms        Relevant Orders    Estradiol    FSH & LH    TSH Rfx On Abnormal To Free T4    CBC (No Diff)    Comprehensive Metabolic Panel    Vitamin D,25-Hydroxy    Lipid Panel            GYN annual well woman exam.   Pap guidelines reviewed. Pap today with vaginitis panel added.  Perimenopause discussed in detail. Will do labs today.  Fibrocystic breast changes - Encouraged decreasing caffeine, supportive bra, low dose vitamin E supplementation.  Reviewed monthly self breast  exams.  Instructed to call with lumps, pain, or breast discharge.    Ordered Mammogram today  Recommended use of Vitamin D replacement and getting adequate calcium in her diet. (1500mg)  Reviewed BMI and weight loss as preventative health measures.   Reviewed exercise as a preventative health measures.   Reccommended Flu Vaccine in Fall of each year.  Symptoms of menopausal transition reviewed with patient.   RTC in 1 year or PRN with problems.  Pt to make an appt with Dr. Bailey with an US and EMB and to discuss uterine ablation.    Caty Demarco, APRN  02/20/2025

## 2025-02-20 NOTE — TELEPHONE ENCOUNTER
Caller: Janell Terrell    Relationship: Self    Best call back number: [phone]    What is the best time to reach you: ANY    Who are you requesting to speak with (clinical staff, provider,  specific staff member):        What was the call regarding; PER OFFICE VISITS NOTES; Pt to make an appt with Dr. Bailey with an US and EMB and to discuss uterine ablation.   Pt called to paxton; unable to wt.

## 2025-02-21 LAB
ESTRADIOL SERPL-MCNC: 291 PG/ML
FSH SERPL-ACNC: 6.1 MIU/ML
LH SERPL-ACNC: 12.9 MIU/ML

## 2025-02-25 LAB — REF LAB TEST METHOD: NORMAL

## 2025-02-28 DIAGNOSIS — J45.50 SEVERE PERSISTENT ASTHMA WITHOUT COMPLICATION: ICD-10-CM

## 2025-02-28 DIAGNOSIS — J45.50 SEVERE PERSISTENT STEROID-DEPENDENT ASTHMA WITHOUT COMPLICATION: Primary | ICD-10-CM

## 2025-02-28 RX ORDER — TEZEPELUMAB-EKKO 210 MG/1.9ML
1.91 INJECTION, SOLUTION SUBCUTANEOUS
Qty: 1.91 ML | Refills: 11 | Status: SHIPPED | OUTPATIENT
Start: 2025-02-28

## 2025-02-28 NOTE — TELEPHONE ENCOUNTER
[facility]

## 2025-03-17 DIAGNOSIS — N94.9 VAGINAL DISCOMFORT: Primary | ICD-10-CM

## 2025-03-17 DIAGNOSIS — N94.6 DYSMENORRHEA: ICD-10-CM

## 2025-03-17 DIAGNOSIS — N93.9 ABNORMAL UTERINE BLEEDING (AUB): ICD-10-CM

## 2025-03-18 ENCOUNTER — OFFICE VISIT (OUTPATIENT)
Dept: OBSTETRICS AND GYNECOLOGY | Facility: CLINIC | Age: 45
End: 2025-03-18
Payer: COMMERCIAL

## 2025-03-18 VITALS
WEIGHT: 220.2 LBS | BODY MASS INDEX: 36.69 KG/M2 | DIASTOLIC BLOOD PRESSURE: 86 MMHG | HEIGHT: 65 IN | SYSTOLIC BLOOD PRESSURE: 140 MMHG

## 2025-03-18 DIAGNOSIS — N94.6 DYSMENORRHEA: ICD-10-CM

## 2025-03-18 DIAGNOSIS — N93.9 ABNORMAL UTERINE BLEEDING (AUB): Primary | ICD-10-CM

## 2025-03-18 RX ORDER — LUMATEPERONE 21 MG/1
CAPSULE ORAL
COMMUNITY

## 2025-03-18 RX ORDER — ESTRADIOL 10 UG/1
1 TABLET, FILM COATED VAGINAL 2 TIMES WEEKLY
Qty: 8 TABLET | Refills: 11 | Status: SHIPPED | OUTPATIENT
Start: 2025-03-20 | End: 2026-02-19

## 2025-03-18 NOTE — PROGRESS NOTES
Chief Complaint   Patient presents with    Follow-up     Menorrhagia & dysmenorrhea         Subjective   HPI  Janell Terrell is a 44 y.o. female, , her last LMP was Patient's last menstrual period was 2025..  She presents for a follow up evaluation of Menorrhagia and Dysmenorrhea. The patient was last seen  4 weeks ago by GEGE Ma. At that time she reported heavy bleeding for the first few days of her period, saturating a super tampon every hour with severe dysmenorrhea. She had labs drawn. The plan was  to return for an US, EMB, and to discuss an ablation.  Since the last visit the patient reports the plan has remained unchanged. This has been an issue in the past. The patient reports additional symptoms as none.      US was done today.          Endometrial Biopsy      Indications: Janell Terrell is a 44 y.o. , who presents today for evaluation of Menorrhagia. As part of the evaluation, an endometrial biopsy was recommended.  The patient was noted to have Menorrhagia.  Her LMP is Patient's last menstrual period was 2025.  After being presented with the risk, benefits, and specific detail of the procedure, the patient wished to proceed.  Written consent was obtained from patient.   Urine pregnancy test was not indicated. Patient has had a tubal ligation. Patient does not have an allergy to betadine or shellfish.     Procedure Details     Time out: immediate members of the procedure team and patient agree to the following: correct patient, correct site, correct procedure to be performed.  Heike Bailey MD        The patient was placed on the table in the dorsal lithotomy position.  She was draped in the appropriate manner.  A speculum was placed in the vagina.  The cervix was visualized and prepped with Betadine.  A tenaculum was placed on the anterior lip of the cervix for traction.  A small plastic 5 mm Pipelle syringe curette was inserted into the cervical  "canal.  The uterus was sounded to 8 cm's.  A vigorous four quadrant biopsy was performed, removing a medium amount of tissue.  The tissue was placed in Formalin and sent to Pathology.  The patient tolerated the procedure very well and she reported mild cramping.  The tenaculum was removed from the cervix and the speculum was removed.  The patient was observed for 5 minutes.           Complications: none.     Procedures    Review of Systems   Constitutional: Negative.    HENT: Negative.     Eyes: Negative.    Respiratory: Negative.     Cardiovascular: Negative.    Endocrine: Negative.    Genitourinary:  Positive for menstrual problem and vaginal bleeding.   Musculoskeletal: Negative.    Allergic/Immunologic: Negative.    Neurological: Negative.    Hematological: Negative.    Psychiatric/Behavioral: Negative.       /86   Ht 165.1 cm (65\")   Wt 99.9 kg (220 lb 3.2 oz)   LMP 02/27/2025   BMI 36.64 kg/m²       Plan:  No orders of the defined types were placed in this encounter.      Problem List Items Addressed This Visit    None  Visit Diagnoses         Abnormal uterine bleeding (AUB)    -  Primary    Relevant Orders    TISSUE EXAM, [facility] (PERRY,COR,ABHIJIT)      Dysmenorrhea        Relevant Orders    TISSUE EXAM, [facility] (PERRY,COR,ABHIJIT)                Instructions  Call the office in 5 business days for biopsy results.  Patient instructed to call the office if develops a fever of 100.4 or greater, vaginal bleeding heavier than a period, foul vaginal discharge or pain.     Heike Bailey MD  03/18/2025       Additional OB/GYN History   Last Pap :   Last Completed Pap Smear            Upcoming       PAP SMEAR (Every 3 Years) Next due on 2/20/2028 02/20/2025  LIQUID-BASED PAP SMEAR WITH HPV GENOTYPING REGARDLESS OF INTERPRETATION (MARIA FERNANDA AMADOR,ABHIJIT)    07/16/2021  SCANNED - PAP SMEAR    07/11/2018  Done - regardless; negative                          History of abnormal Pap smear:   Tobacco Usage?: No       Current " Outpatient Medications:     Airsupra 90-80 MCG/ACT aerosol, Inhale 2 puffs Every 4 (Four) Hours As Needed (shortness of breath, cough, wheezing)., Disp: 10.7 g, Rfl: 3    albuterol (ACCUNEB) 1.25 MG/3ML nebulizer solution, Take 3 mL by nebulization Every 6 (Six) Hours As Needed for Wheezing or Shortness of Air., Disp: 90 mL, Rfl: 0    cetirizine (zyrTEC) 10 MG tablet, Take 1 tablet by mouth Daily., Disp: , Rfl:     clonazePAM (KlonoPIN) 0.5 MG tablet, Take 1 tablet by mouth As Needed., Disp: , Rfl:     EPINEPHrine (EPIPEN) 0.3 MG/0.3ML solution auto-injector injection, , Disp: , Rfl:     flunisolide (NASALIDE) 25 MCG/ACT (0.025%) solution nasal spray, Inhale 2 sprays Every 12 (Twelve) Hours., Disp: , Rfl:     ipratropium (ATROVENT) 0.02 % nebulizer solution, Take 2.5 mL by nebulization 4 (Four) Times a Day As Needed for Wheezing or Shortness of Air., Disp: 2.5 mL, Rfl: 5    levalbuterol (XOPENEX HFA) 45 MCG/ACT inhaler, Inhale 2 puffs 4 (Four) Times a Day As Needed for Wheezing or Shortness of Air., Disp: 15 g, Rfl: 5    Lumateperone Tosylate (Caplyta) 21 MG capsule, Take  by mouth., Disp: , Rfl:     montelukast (SINGULAIR) 10 MG tablet, Take 1 tablet by mouth Every Night., Disp: 30 tablet, Rfl: 5    omeprazole (priLOSEC) 40 MG capsule, Take 1 capsule by mouth Daily., Disp: 30 capsule, Rfl: 0    Semaglutide, 1 MG/DOSE, (OZEMPIC) 2 MG/1.5ML solution pen-injector, Inject  under the skin into the appropriate area as directed 1 (One) Time Per Week., Disp: , Rfl:     sertraline (ZOLOFT) 50 MG tablet, Take 1 tablet by mouth Daily., Disp: , Rfl:     Tezepelumab-ekko (Tezspire) 210 MG/1.91ML solution auto-injector, Inject 1.91 mL under the skin into the appropriate area as directed Every 28 (Twenty-Eight) Days. **See note to pharmacy for copay card information**, Disp: 1.91 mL, Rfl: 11    Trelegy Ellipta 200-62.5-25 MCG/ACT inhaler, INHALE 1 PUFF BY MOUTH EVERY DAY, Disp: 60 each, Rfl: 0    amoxicillin-clavulanate  "(AUGMENTIN) 875-125 MG per tablet, Take 1 tablet by mouth 2 (Two) Times a Day. Take with food or milk (Patient not taking: Reported on 3/18/2025), Disp: 14 tablet, Rfl: 0    [START ON 3/20/2025] estradiol (Vagifem) 10 MCG tablet vaginal tablet, Insert 1 tablet into the vagina 2 (Two) Times a Week for 336 days., Disp: 8 tablet, Rfl: 11     Past Medical History:   Diagnosis Date    Allergic rhinitis     Anxiety and depression     situational;  was deployed    Asthma     Asthma, extrinsic Childood    Asthma, intrinsic     Childhood    Diabetes mellitus     gestational    GERD (gastroesophageal reflux disease)     Gestational diabetes     Hypertension     Had preeclampsia and CHTN since then    Migraines     Pneumonia Childhood        Past Surgical History:   Procedure Laterality Date     SECTION       SECTION WITH TUBAL Bilateral 2018    Procedure:  SECTION REPEAT WITH TUBAL * 37 WKS - GEST. DIABETIC, ELEVATED BPS*;  Surgeon: Terrence Dutta MD;  Location: [facility] LABOR DELIVERY;  Service: Obstetrics/Gynecology    WISDOM TOOTH EXTRACTION         The additional following portions of the patient's history were reviewed and updated as appropriate: allergies and current medications.    Review of Systems   Constitutional: Negative.    HENT: Negative.     Eyes: Negative.    Respiratory: Negative.     Cardiovascular: Negative.    Endocrine: Negative.    Genitourinary:  Positive for menstrual problem and vaginal bleeding.   Musculoskeletal: Negative.    Allergic/Immunologic: Negative.    Neurological: Negative.    Hematological: Negative.    Psychiatric/Behavioral: Negative.         I have reviewed and agree with the HPI, ROS, and historical information as entered above. Heike Bailey MD      Objective   /86   Ht 165.1 cm (65\")   Wt 99.9 kg (220 lb 3.2 oz)   LMP 2025   BMI 36.64 kg/m²     Physical Exam  Constitutional:       Appearance: She is " well-developed.   HENT:      Head: Normocephalic.   Eyes:      Conjunctiva/sclera: Conjunctivae normal.   Pulmonary:      Effort: Pulmonary effort is normal.   Genitourinary:     General: Normal vulva.      Vagina: Normal.      Cervix: Normal.   Psychiatric:         Behavior: Behavior normal.         Assessment & Plan     Encounter Diagnoses   Name Primary?    Abnormal uterine bleeding (AUB) Yes    Dysmenorrhea            Plan     HMB - endometrial biopsy done.  U/S reviewed.  She is interested in endometrial ablation  Once the bx back will schedule.  Her prior C/S are documented LTUI.  Vaginal dryness - discussed and will start vaginal estrogen. Twice weekly.      Heike Bailey MD  03/18/2025

## 2025-03-19 LAB — REF LAB TEST METHOD: NORMAL

## 2025-04-15 ENCOUNTER — OFFICE VISIT (OUTPATIENT)
Dept: OBSTETRICS AND GYNECOLOGY | Facility: CLINIC | Age: 45
End: 2025-04-15
Payer: COMMERCIAL

## 2025-04-15 VITALS
WEIGHT: 215 LBS | HEIGHT: 65 IN | SYSTOLIC BLOOD PRESSURE: 124 MMHG | BODY MASS INDEX: 35.82 KG/M2 | DIASTOLIC BLOOD PRESSURE: 82 MMHG

## 2025-04-15 DIAGNOSIS — Z01.818 PRE-OP EXAM: Primary | ICD-10-CM

## 2025-04-15 RX ORDER — ONDANSETRON 4 MG/1
1 TABLET, FILM COATED ORAL 3 TIMES DAILY
COMMUNITY
Start: 2025-03-01

## 2025-04-15 RX ORDER — LOSARTAN POTASSIUM 50 MG/1
1 TABLET ORAL EVERY 12 HOURS SCHEDULED
COMMUNITY
Start: 2025-03-19

## 2025-04-15 NOTE — PROGRESS NOTES
Gynecologic Preoperative Exam Note        Subjective   Janell Terrell is a 44 y.o. year old  who is scheduled for Hysteroscopy D&C with Yessy at [facility] on 2025. Her pre operative diagnosis is DUB and Dysmenorrhea. She does not need to see her PCP for preop clearance for this surgery. Patient's last menstrual period was 2025 (exact date). Her birth control method is bilateral tubal ligation.  Her BMI is Body mass index is 35.78 kg/m²..  Her medical history is significant for use of GLP-1 for diabetes- CargraSima and HTN.    She has reviewed the informational pamphlet on 04/15/2025.    She understands the risks of bleeding, infection, possible damage to other organ systems, including but not limited to the gastrointestinal tract and genitourinary tract.  She also understands the specific risks listed in the preop information (video, pamphlets, etc.).    She has reviewed and signed the preop consent form.    Her code status is: FULL     She has been instructed to have a light dinner the night before surgery, then nothing to eat or drink after midnight.  The day of surgery do not chew gum or smoke.  Remove all jewelry, nail polish, contact lenses prior to coming to the hospital.  Do not bring valuables or large sums of money with you. Patient was instructed on what time to arrive and where to check in, maps were given.  She was instructed that she will meet an Anesthesiologist and that an IV will be started to provide fluids and sedation.  The total time of procedure was discussed.  She was instructed that she will need a .      Allergies   Allergen Reactions    Doxycycline      Chemical esophagitis      Avelox [Moxifloxacin] Rash    Zithromax [Azithromycin] Rash     She has confirmed that she is not allergic to Latex.     She is on the following medications. These were reviewed with the patient today and instructed on which medications are ok to take with a sip of water prior to the  surgery.      Current Outpatient Medications:     Airsupra 90-80 MCG/ACT aerosol, Inhale 2 puffs Every 4 (Four) Hours As Needed (shortness of breath, cough, wheezing)., Disp: 10.7 g, Rfl: 3    albuterol (ACCUNEB) 1.25 MG/3ML nebulizer solution, Take 3 mL by nebulization Every 6 (Six) Hours As Needed for Wheezing or Shortness of Air., Disp: 90 mL, Rfl: 0    amoxicillin-clavulanate (AUGMENTIN) 875-125 MG per tablet, Take 1 tablet by mouth 2 (Two) Times a Day. Take with food or milk, Disp: 14 tablet, Rfl: 0    cetirizine (zyrTEC) 10 MG tablet, Take 1 tablet by mouth Daily., Disp: , Rfl:     clonazePAM (KlonoPIN) 0.5 MG tablet, Take 1 tablet by mouth As Needed., Disp: , Rfl:     EPINEPHrine (EPIPEN) 0.3 MG/0.3ML solution auto-injector injection, , Disp: , Rfl:     estradiol (Vagifem) 10 MCG tablet vaginal tablet, Insert 1 tablet into the vagina 2 (Two) Times a Week for 336 days., Disp: 8 tablet, Rfl: 11    flunisolide (NASALIDE) 25 MCG/ACT (0.025%) solution nasal spray, Inhale 2 sprays Every 12 (Twelve) Hours., Disp: , Rfl:     ipratropium (ATROVENT) 0.02 % nebulizer solution, Take 2.5 mL by nebulization 4 (Four) Times a Day As Needed for Wheezing or Shortness of Air., Disp: 2.5 mL, Rfl: 5    levalbuterol (XOPENEX HFA) 45 MCG/ACT inhaler, Inhale 2 puffs 4 (Four) Times a Day As Needed for Wheezing or Shortness of Air., Disp: 15 g, Rfl: 5    losartan (COZAAR) 50 MG tablet, Take 1 tablet by mouth Every 12 (Twelve) Hours., Disp: , Rfl:     Lumateperone Tosylate (Caplyta) 21 MG capsule, Take  by mouth., Disp: , Rfl:     montelukast (SINGULAIR) 10 MG tablet, Take 1 tablet by mouth Every Night., Disp: 30 tablet, Rfl: 5    NON FORMULARY, Inject 10 Units under the skin into the appropriate area as directed 1 (One) Time Per Week. CargraSima for Diabetes, Disp: , Rfl:     omeprazole (priLOSEC) 40 MG capsule, Take 1 capsule by mouth Daily., Disp: 30 capsule, Rfl: 0    ondansetron (ZOFRAN) 4 MG tablet, Take 1 tablet by mouth 3 times  a day., Disp: , Rfl:     sertraline (ZOLOFT) 50 MG tablet, Take 1 tablet by mouth Daily., Disp: , Rfl:     Tezepelumab-ekko (Tezspire) 210 MG/1.91ML solution auto-injector, Inject 1.91 mL under the skin into the appropriate area as directed Every 28 (Twenty-Eight) Days. **See note to pharmacy for copay card information**, Disp: 1.91 mL, Rfl: 11    Trelegy Ellipta 200-62.5-25 MCG/ACT inhaler, INHALE 1 PUFF BY MOUTH EVERY DAY, Disp: 60 each, Rfl: 0     Past Medical History:   Diagnosis Date    Anxiety and depression     situational;  was deployed    Hypertension     Had preeclampsia and CHTN since then    Allergic rhinitis     Asthma     Asthma, extrinsic Childood    Asthma, intrinsic     Childhood    Diabetes mellitus     gestational    GERD (gastroesophageal reflux disease)     Gestational diabetes     Hyperlipidemia 3/9/18    Migraines     Pneumonia Childhood    Vitamin D deficiency      Past Surgical History:   Procedure Laterality Date    WISDOM TOOTH EXTRACTION       SECTION       SECTION WITH TUBAL Bilateral 2018    Procedure:  SECTION REPEAT WITH TUBAL * 37 WKS - GEST. DIABETIC, ELEVATED BPS*;  Surgeon: Terrence Dutta MD;  Location: [facility] LABOR DELIVERY;  Service: Obstetrics/Gynecology     OB History    Para Term  AB Living   2 2 1 1 0 2   SAB IAB Ectopic Molar Multiple Live Births   0 0 0 0 0 2      # Outcome Date GA Lbr Alberto/2nd Weight Sex Type Anes PTL Lv   2 Term 18 37w0d  2906 g (6 lb 6.5 oz) M CS-LTranv Spinal, EPI N ASA      Name: CHOPRASCOTTCARAALBERT      Apgar1: 6  Apgar5: 9   1  08 35w0d  2948 g (6 lb 8 oz) F CS-Unspec  Y ASA      Birth Comments: Bedrest from 24 weeks for HTN; Hospitalized for about a month with previa with bleeding and preeclampsia; delivered for worsening BP      Complications: Preeclampsia, Placenta previa     Social History     Tobacco Use   Smoking Status Never    Passive exposure: Past    Smokeless Tobacco Never   Tobacco Comments    Mother smoke home when patient was younger     Social History     Substance and Sexual Activity   Alcohol Use Yes    Comment: occasional     Social History     Substance and Sexual Activity   Drug Use No         Review of Systems   Constitutional: Negative.    HENT: Negative.     Eyes: Negative.    Respiratory: Negative.     Cardiovascular: Negative.    Gastrointestinal: Negative.    Endocrine: Negative.    Genitourinary:  Positive for menstrual problem.   Musculoskeletal: Negative.    Skin: Negative.    Allergic/Immunologic: Negative.    Neurological: Negative.    Hematological: Negative.    Psychiatric/Behavioral: Negative.        All other systems reviewed and negative.          Objective    Vitals:    04/15/25 0858   BP: 124/82         Physical Exam  Vitals and nursing note reviewed. Exam conducted with a chaperone present.   Constitutional:       Appearance: She is well-developed.   HENT:      Head: Normocephalic and atraumatic.   Eyes:      Conjunctiva/sclera: Conjunctivae normal.   Cardiovascular:      Rate and Rhythm: Normal rate and regular rhythm.   Pulmonary:      Effort: Pulmonary effort is normal.      Breath sounds: Normal breath sounds.   Abdominal:      General: Bowel sounds are normal.      Palpations: Abdomen is soft. Abdomen is not rigid.   Musculoskeletal:      Cervical back: Normal range of motion. No muscular tenderness.   Skin:     General: Skin is warm and dry.   Neurological:      Mental Status: She is alert and oriented to person, place, and time.   Psychiatric:         Behavior: Behavior normal.         Assessment   Problem List Items Addressed This Visit    None  Visit Diagnoses         Pre-op exam    -  Primary    Relevant Orders    CBC (No Diff)    Comprehensive Metabolic Panel                     Plan   Plan is for Dx hysteroscopy , D&C Novasure endometrial ablation.  Risks of surgery were reviewed with the patient including risks of  bleeding, infection, damage to other organ systems including, but not limited to GI and  tracts (bowel, bladder, blood vessels, nerves) risks of Anesthesia, as well as the risk the surgery will not produce the desired results, possible need for additional surgery, death, risk of uterine perforation.  Understands increase in risk for future hysterectomy.     LTUI C/S scars documented.      Heike Bailey MD  Visit Date: 4/15/2025

## 2025-04-18 ENCOUNTER — TELEPHONE (OUTPATIENT)
Dept: OBSTETRICS AND GYNECOLOGY | Facility: CLINIC | Age: 45
End: 2025-04-18
Payer: COMMERCIAL

## 2025-04-21 ENCOUNTER — LAB REQUISITION (OUTPATIENT)
Dept: LAB | Facility: HOSPITAL | Age: 45
End: 2025-04-21

## 2025-04-21 ENCOUNTER — OUTSIDE FACILITY SERVICE (OUTPATIENT)
Dept: OBSTETRICS AND GYNECOLOGY | Facility: CLINIC | Age: 45
End: 2025-04-21
Payer: COMMERCIAL

## 2025-04-21 DIAGNOSIS — N93.9 ABNORMAL UTERINE AND VAGINAL BLEEDING, UNSPECIFIED: ICD-10-CM

## 2025-04-21 PROCEDURE — 88305 TISSUE EXAM BY PATHOLOGIST: CPT | Performed by: OBSTETRICS & GYNECOLOGY

## 2025-04-23 LAB
CYTO UR: NORMAL
LAB AP CASE REPORT: NORMAL
LAB AP CLINICAL INFORMATION: NORMAL
PATH REPORT.FINAL DX SPEC: NORMAL
PATH REPORT.GROSS SPEC: NORMAL

## 2025-05-06 ENCOUNTER — OFFICE VISIT (OUTPATIENT)
Dept: OBSTETRICS AND GYNECOLOGY | Facility: CLINIC | Age: 45
End: 2025-05-06
Payer: COMMERCIAL

## 2025-05-06 VITALS
BODY MASS INDEX: 35.65 KG/M2 | SYSTOLIC BLOOD PRESSURE: 128 MMHG | DIASTOLIC BLOOD PRESSURE: 80 MMHG | HEIGHT: 65 IN | WEIGHT: 214 LBS

## 2025-05-06 DIAGNOSIS — Z98.890 STATUS POST ENDOMETRIAL ABLATION: Primary | ICD-10-CM

## 2025-05-06 RX ORDER — DIAZEPAM 5 MG/1
1 TABLET ORAL 3 TIMES DAILY
COMMUNITY
Start: 2025-04-22

## 2025-05-06 NOTE — PROGRESS NOTES
OBGYN Postoperative Exam Note          Subjective   Chief Complaint   Patient presents with    Post-op Follow-up     Janell Terrell is a 44 y.o. year old  presenting to be seen for her post-operative visit. She is S/P Hysteroscopy D&C with Yessy on 25 at [facility] for Menorrhagia. Currently she reports no problems with eating, bowel movements, voiding, or wound drainage and pain is well controlled.    The results were discussed with Janell.    OTHER THINGS SHE WANTS TO DISCUSS TODAY:  Nothing else      Current Outpatient Medications:     Airsupra 90-80 MCG/ACT aerosol, Inhale 2 puffs Every 4 (Four) Hours As Needed (shortness of breath, cough, wheezing)., Disp: 10.7 g, Rfl: 3    albuterol (ACCUNEB) 1.25 MG/3ML nebulizer solution, Take 3 mL by nebulization Every 6 (Six) Hours As Needed for Wheezing or Shortness of Air., Disp: 90 mL, Rfl: 0    cetirizine (zyrTEC) 10 MG tablet, Take 1 tablet by mouth Daily., Disp: , Rfl:     clonazePAM (KlonoPIN) 0.5 MG tablet, Take 1 tablet by mouth As Needed., Disp: , Rfl:     diazePAM (VALIUM) 5 MG tablet, Take 1 tablet by mouth 3 times a day., Disp: , Rfl:     EPINEPHrine (EPIPEN) 0.3 MG/0.3ML solution auto-injector injection, , Disp: , Rfl:     flunisolide (NASALIDE) 25 MCG/ACT (0.025%) solution nasal spray, Inhale 2 sprays Every 12 (Twelve) Hours., Disp: , Rfl:     ipratropium (ATROVENT) 0.02 % nebulizer solution, Take 2.5 mL by nebulization 4 (Four) Times a Day As Needed for Wheezing or Shortness of Air., Disp: 2.5 mL, Rfl: 5    levalbuterol (XOPENEX HFA) 45 MCG/ACT inhaler, Inhale 2 puffs 4 (Four) Times a Day As Needed for Wheezing or Shortness of Air., Disp: 15 g, Rfl: 5    losartan (COZAAR) 50 MG tablet, Take 1 tablet by mouth Every 12 (Twelve) Hours., Disp: , Rfl:     Lumateperone Tosylate (Caplyta) 21 MG capsule, Take  by mouth., Disp: , Rfl:     montelukast (SINGULAIR) 10 MG tablet, Take 1 tablet by mouth Every Night., Disp: 30 tablet, Rfl: 5    NON  FORMULARY, Inject 10 Units under the skin into the appropriate area as directed 1 (One) Time Per Week. CargraSima for Diabetes, Disp: , Rfl:     omeprazole (priLOSEC) 40 MG capsule, Take 1 capsule by mouth Daily., Disp: 30 capsule, Rfl: 0    ondansetron (ZOFRAN) 4 MG tablet, Take 1 tablet by mouth 3 times a day., Disp: , Rfl:     sertraline (ZOLOFT) 50 MG tablet, Take 1 tablet by mouth Daily., Disp: , Rfl:     Tezepelumab-ekko (Tezspire) 210 MG/1.91ML solution auto-injector, Inject 1.91 mL under the skin into the appropriate area as directed Every 28 (Twenty-Eight) Days. **See note to pharmacy for copay card information**, Disp: 1.91 mL, Rfl: 11    Trelegy Ellipta 200-62.5-25 MCG/ACT inhaler, INHALE 1 PUFF BY MOUTH EVERY DAY, Disp: 60 each, Rfl: 0    estradiol (Vagifem) 10 MCG tablet vaginal tablet, Insert 1 tablet into the vagina 2 (Two) Times a Week for 336 days. (Patient not taking: Reported on 2025), Disp: 8 tablet, Rfl: 11     Past Medical History:   Diagnosis Date    Allergic rhinitis     Anxiety and depression     situational;  was deployed    Asthma     Asthma, extrinsic Childood    Asthma, intrinsic     Childhood    Diabetes mellitus     gestational    GERD (gastroesophageal reflux disease)     Gestational diabetes     Hyperlipidemia 3/9/18    Hypertension     Had preeclampsia and CHTN since then    Migraines     Pneumonia Childhood    Vitamin D deficiency         Past Surgical History:   Procedure Laterality Date     SECTION       SECTION WITH TUBAL Bilateral 2018    Procedure:  SECTION REPEAT WITH TUBAL * 37 WKS - GEST. DIABETIC, ELEVATED BPS*;  Surgeon: Terrence Dutta MD;  Location: [facility] LABOR DELIVERY;  Service: Obstetrics/Gynecology    WISDOM TOOTH EXTRACTION         The following portions of the patient's history were reviewed and updated as appropriate:current medications and allergies    Review of Systems   Constitutional: Negative.  "   HENT: Negative.     Eyes: Negative.    Respiratory: Negative.     Cardiovascular: Negative.    Gastrointestinal: Negative.    Endocrine: Negative.    Genitourinary: Negative.    Musculoskeletal: Negative.    Skin: Negative.    Allergic/Immunologic: Negative.    Neurological: Negative.    Hematological: Negative.    Psychiatric/Behavioral: Negative.            Objective   /80   Ht 165.1 cm (65\")   Wt 97.1 kg (214 lb)   LMP 04/03/2025 (Exact Date)   BMI 35.61 kg/m²     Physical Exam  Constitutional:       Appearance: She is well-developed.   HENT:      Head: Normocephalic.   Eyes:      Conjunctiva/sclera: Conjunctivae normal.   Pulmonary:      Effort: Pulmonary effort is normal.   Psychiatric:         Behavior: Behavior normal.              Assessment   S/P Novasure     Plan   May return to full activity with no restrictions  Pathology was reviewed with patient  The importance of keeping all planned follow-up and taking all medications as prescribed was emphasized.  Annual or prn              Heike Bailey MD  05/06/2025    "

## 2025-05-14 ENCOUNTER — APPOINTMENT (OUTPATIENT)
Dept: CT IMAGING | Facility: HOSPITAL | Age: 45
End: 2025-05-14
Payer: COMMERCIAL

## 2025-05-14 ENCOUNTER — APPOINTMENT (OUTPATIENT)
Dept: GENERAL RADIOLOGY | Facility: HOSPITAL | Age: 45
End: 2025-05-14
Payer: COMMERCIAL

## 2025-05-14 ENCOUNTER — HOSPITAL ENCOUNTER (EMERGENCY)
Facility: HOSPITAL | Age: 45
Discharge: HOME | End: 2025-05-14
Attending: EMERGENCY MEDICINE | Admitting: EMERGENCY MEDICINE
Payer: COMMERCIAL

## 2025-05-14 ENCOUNTER — APPOINTMENT (OUTPATIENT)
Dept: NEUROLOGY | Facility: HOSPITAL | Age: 45
End: 2025-05-14
Payer: COMMERCIAL

## 2025-05-14 ENCOUNTER — APPOINTMENT (OUTPATIENT)
Dept: MRI IMAGING | Facility: HOSPITAL | Age: 45
End: 2025-05-14
Payer: COMMERCIAL

## 2025-05-14 VITALS
DIASTOLIC BLOOD PRESSURE: 79 MMHG | WEIGHT: 210 LBS | HEART RATE: 70 BPM | OXYGEN SATURATION: 92 % | SYSTOLIC BLOOD PRESSURE: 112 MMHG | HEIGHT: 65 IN | TEMPERATURE: 98.3 F | BODY MASS INDEX: 34.99 KG/M2 | RESPIRATION RATE: 16 BRPM

## 2025-05-14 DIAGNOSIS — G43.419 INTRACTABLE HEMIPLEGIC MIGRAINE WITHOUT STATUS MIGRAINOSUS: ICD-10-CM

## 2025-05-14 DIAGNOSIS — R56.9 SEIZURE-LIKE ACTIVITY: Primary | ICD-10-CM

## 2025-05-14 DIAGNOSIS — R29.818 FUNCTIONAL NEUROLOGIC COMPLAINT: ICD-10-CM

## 2025-05-14 LAB
ALT SERPL W P-5'-P-CCNC: 55 U/L (ref 1–33)
APTT PPP: 28.3 SECONDS (ref 22–39)
AST SERPL-CCNC: 30 U/L (ref 1–32)
BASOPHILS # BLD AUTO: 0.06 10*3/MM3 (ref 0–0.2)
BASOPHILS NFR BLD AUTO: 0.7 % (ref 0–1.5)
BUN BLDA-MCNC: 12 MG/DL (ref 8–26)
CA-I BLDA-SCNC: 1.2 MMOL/L (ref 1.2–1.32)
CHLORIDE BLDA-SCNC: 104 MMOL/L (ref 98–109)
CO2 BLDA-SCNC: 20 MMOL/L (ref 24–29)
CREAT BLDA-MCNC: 0.8 MG/DL (ref 0.6–1.3)
DEPRECATED RDW RBC AUTO: 41.1 FL (ref 37–54)
EGFRCR SERPLBLD CKD-EPI 2021: 93.9 ML/MIN/1.73
EOSINOPHIL # BLD AUTO: 0.25 10*3/MM3 (ref 0–0.4)
EOSINOPHIL NFR BLD AUTO: 2.8 % (ref 0.3–6.2)
ERYTHROCYTE [DISTWIDTH] IN BLOOD BY AUTOMATED COUNT: 11.9 % (ref 12.3–15.4)
GLUCOSE BLDC GLUCOMTR-MCNC: 85 MG/DL (ref 70–130)
HCT VFR BLD AUTO: 44 % (ref 34–46.6)
HCT VFR BLDA CALC: 41 % (ref 38–51)
HGB BLD-MCNC: 14.8 G/DL (ref 12–15.9)
HGB BLDA-MCNC: 13.9 G/DL (ref 12–17)
HOLD SPECIMEN: NORMAL
IMM GRANULOCYTES # BLD AUTO: 0.05 10*3/MM3 (ref 0–0.05)
IMM GRANULOCYTES NFR BLD AUTO: 0.6 % (ref 0–0.5)
INR PPP: 0.95 (ref 0.89–1.12)
LYMPHOCYTES # BLD AUTO: 1.93 10*3/MM3 (ref 0.7–3.1)
LYMPHOCYTES NFR BLD AUTO: 21.4 % (ref 19.6–45.3)
MCH RBC QN AUTO: 31.9 PG (ref 26.6–33)
MCHC RBC AUTO-ENTMCNC: 33.6 G/DL (ref 31.5–35.7)
MCV RBC AUTO: 94.8 FL (ref 79–97)
MONOCYTES # BLD AUTO: 0.83 10*3/MM3 (ref 0.1–0.9)
MONOCYTES NFR BLD AUTO: 9.2 % (ref 5–12)
NEUTROPHILS NFR BLD AUTO: 5.89 10*3/MM3 (ref 1.7–7)
NEUTROPHILS NFR BLD AUTO: 65.3 % (ref 42.7–76)
NRBC BLD AUTO-RTO: 0 /100 WBC (ref 0–0.2)
PLATELET # BLD AUTO: 349 10*3/MM3 (ref 140–450)
PMV BLD AUTO: 9.5 FL (ref 6–12)
POTASSIUM BLDA-SCNC: 3.8 MMOL/L (ref 3.5–4.9)
PROTHROMBIN TIME: 13.3 SECONDS (ref 12.2–15.3)
QT INTERVAL: 418 MS
QTC INTERVAL: 447 MS
RBC # BLD AUTO: 4.64 10*6/MM3 (ref 3.77–5.28)
SODIUM BLD-SCNC: 139 MMOL/L (ref 138–146)
WBC NRBC COR # BLD AUTO: 9.01 10*3/MM3 (ref 3.4–10.8)
WHOLE BLOOD HOLD COAG: NORMAL
WHOLE BLOOD HOLD SPECIMEN: NORMAL

## 2025-05-14 PROCEDURE — 25810000003 SODIUM CHLORIDE 0.9 % SOLUTION

## 2025-05-14 PROCEDURE — 96375 TX/PRO/DX INJ NEW DRUG ADDON: CPT

## 2025-05-14 PROCEDURE — 99285 EMERGENCY DEPT VISIT HI MDM: CPT

## 2025-05-14 PROCEDURE — 84450 TRANSFERASE (AST) (SGOT): CPT | Performed by: EMERGENCY MEDICINE

## 2025-05-14 PROCEDURE — 25010000002 MAGNESIUM SULFATE IN D5W 1G/100ML (PREMIX) 1-5 GM/100ML-% SOLUTION

## 2025-05-14 PROCEDURE — 95816 EEG AWAKE AND DROWSY: CPT

## 2025-05-14 PROCEDURE — 95816 EEG AWAKE AND DROWSY: CPT | Performed by: PSYCHIATRY & NEUROLOGY

## 2025-05-14 PROCEDURE — 25010000002 METOCLOPRAMIDE PER 10 MG

## 2025-05-14 PROCEDURE — 70498 CT ANGIOGRAPHY NECK: CPT

## 2025-05-14 PROCEDURE — 70496 CT ANGIOGRAPHY HEAD: CPT

## 2025-05-14 PROCEDURE — 36415 COLL VENOUS BLD VENIPUNCTURE: CPT

## 2025-05-14 PROCEDURE — 85610 PROTHROMBIN TIME: CPT | Performed by: EMERGENCY MEDICINE

## 2025-05-14 PROCEDURE — 99284 EMERGENCY DEPT VISIT MOD MDM: CPT

## 2025-05-14 PROCEDURE — 25010000002 DIPHENHYDRAMINE PER 50 MG

## 2025-05-14 PROCEDURE — 85025 COMPLETE CBC W/AUTO DIFF WBC: CPT | Performed by: EMERGENCY MEDICINE

## 2025-05-14 PROCEDURE — 71045 X-RAY EXAM CHEST 1 VIEW: CPT

## 2025-05-14 PROCEDURE — 84460 ALANINE AMINO (ALT) (SGPT): CPT | Performed by: EMERGENCY MEDICINE

## 2025-05-14 PROCEDURE — 0042T HC CT CEREBRAL PERFUSION W/WO CONTRAST: CPT

## 2025-05-14 PROCEDURE — 85730 THROMBOPLASTIN TIME PARTIAL: CPT | Performed by: EMERGENCY MEDICINE

## 2025-05-14 PROCEDURE — 70551 MRI BRAIN STEM W/O DYE: CPT

## 2025-05-14 PROCEDURE — 70450 CT HEAD/BRAIN W/O DYE: CPT

## 2025-05-14 PROCEDURE — 93005 ELECTROCARDIOGRAM TRACING: CPT | Performed by: EMERGENCY MEDICINE

## 2025-05-14 PROCEDURE — 96366 THER/PROPH/DIAG IV INF ADDON: CPT

## 2025-05-14 PROCEDURE — 80047 BASIC METABLC PNL IONIZED CA: CPT

## 2025-05-14 PROCEDURE — 25510000001 IOPAMIDOL PER 1 ML: Performed by: EMERGENCY MEDICINE

## 2025-05-14 PROCEDURE — 96365 THER/PROPH/DIAG IV INF INIT: CPT

## 2025-05-14 PROCEDURE — 85014 HEMATOCRIT: CPT

## 2025-05-14 RX ORDER — MAGNESIUM SULFATE 1 G/100ML
1 INJECTION INTRAVENOUS ONCE
Status: COMPLETED | OUTPATIENT
Start: 2025-05-14 | End: 2025-05-14

## 2025-05-14 RX ORDER — METOCLOPRAMIDE HYDROCHLORIDE 5 MG/ML
10 INJECTION INTRAMUSCULAR; INTRAVENOUS ONCE
Status: COMPLETED | OUTPATIENT
Start: 2025-05-14 | End: 2025-05-14

## 2025-05-14 RX ORDER — ACETAMINOPHEN 500 MG
1000 TABLET ORAL ONCE
Status: COMPLETED | OUTPATIENT
Start: 2025-05-14 | End: 2025-05-14

## 2025-05-14 RX ORDER — IOPAMIDOL 755 MG/ML
100 INJECTION, SOLUTION INTRAVASCULAR
Status: COMPLETED | OUTPATIENT
Start: 2025-05-14 | End: 2025-05-14

## 2025-05-14 RX ORDER — SODIUM CHLORIDE 0.9 % (FLUSH) 0.9 %
10 SYRINGE (ML) INJECTION AS NEEDED
Status: DISCONTINUED | OUTPATIENT
Start: 2025-05-14 | End: 2025-05-14 | Stop reason: HOSPADM

## 2025-05-14 RX ORDER — DIPHENHYDRAMINE HYDROCHLORIDE 50 MG/ML
25 INJECTION, SOLUTION INTRAMUSCULAR; INTRAVENOUS ONCE
Status: COMPLETED | OUTPATIENT
Start: 2025-05-14 | End: 2025-05-14

## 2025-05-14 RX ADMIN — METOCLOPRAMIDE 10 MG: 5 INJECTION, SOLUTION INTRAMUSCULAR; INTRAVENOUS at 13:14

## 2025-05-14 RX ADMIN — SODIUM CHLORIDE 1000 ML: 900 INJECTION, SOLUTION INTRAVENOUS at 13:13

## 2025-05-14 RX ADMIN — DIPHENHYDRAMINE HYDROCHLORIDE 25 MG: 50 INJECTION INTRAMUSCULAR; INTRAVENOUS at 13:15

## 2025-05-14 RX ADMIN — IOPAMIDOL 115 ML: 755 INJECTION, SOLUTION INTRAVENOUS at 12:44

## 2025-05-14 RX ADMIN — ACETAMINOPHEN 1000 MG: 500 TABLET ORAL at 13:17

## 2025-05-14 RX ADMIN — MAGNESIUM SULFATE 1 G: 1 INJECTION INTRAVENOUS at 13:18

## 2025-05-14 NOTE — CONSULTS
"Stroke Consult Note    Patient Name: Janell Terrell   MRN: [id number]  Age: 44 y.o.  Sex: female  : 1980    Primary Care Physician: Marianna Mckeon PA-C  Referring Physician:  MD Princess    TIME STROKE TEAM CALLED: 1223 EST     TIME PATIENT SEEN: 1225 EST    Handedness: Right  Race:       Chief Complaint/Reason for Consultation: Transient left-sided weakness, staring spells, episodes of shaking    HPI: Janell Terrell is a 44-year-old female with past medical history of migraines/complex migraine, asthma, anxiety and depression, HTN, HLD presents to [facility] for further evaluation of transient left-sided weakness and seizure-like activity.  She states that around 1040 this morning she was watching a TV show when she had clenching of the muscles on the left side of her body including her left arm and leg followed by left-sided weakness.  She tells me that yesterday she experienced 5-minute episode of weakness on her left upper and lower extremities.  She also states that she has had episodes like this in the past with her migraines.  She denies headache currently.    While in CT scan patient had < 1 minute episode of staring off and body shaking.  Patient aroused to noxious stimuli stating \"That hurts! What is going on!?\"  She denies history of seizures.  She is not a candidate for IV thrombolytic therapy as seizure-like activity was witnessed and patient did have symptoms yesterday.  She is not a candidate for neurovascular intervention as there is no LVO on CT scan.      Last Known Normal Date/Time: Yesterday afternoon    Review of Systems   Neurological:  Positive for tremors, facial asymmetry and weakness.      Past Medical History:   Diagnosis Date    Allergic rhinitis     Anxiety and depression     situational;  was deployed    Asthma     Asthma, extrinsic Childood    Asthma, intrinsic     Childhood    Diabetes mellitus     gestational    GERD (gastroesophageal " reflux disease)     Gestational diabetes     Hyperlipidemia 3/9/18    Hypertension 2008    Had preeclampsia and CHTN since then    Migraines     Pneumonia Childhood    Vitamin D deficiency 2016     Past Surgical History:   Procedure Laterality Date     SECTION       SECTION WITH TUBAL Bilateral 2018    Procedure:  SECTION REPEAT WITH TUBAL * 37 WKS - GEST. DIABETIC, ELEVATED BPS*;  Surgeon: Terrence Dutta MD;  Location: [facility] LABOR DELIVERY;  Service: Obstetrics/Gynecology    WISDOM TOOTH EXTRACTION       Family History   Problem Relation Age of Onset    Uterine cancer Mother         possibly    Breast cancer Mother 40        Lumpectomy    Hypothyroidism Mother     Thyroid disease Mother     Hypothyroidism Father     Thyroid disease Father     Asthma Sister     Hypothyroidism Sister     Asthma Sister     Diabetes Sister     Hypothyroidism Brother     Asthma Brother     Asthma Brother     Cancer Maternal Grandmother         Lung CA    Cancer Maternal Grandfather         Lung CA    Diabetes Sister     Thyroid disease Sister     Thyroid disease Brother     Ovarian cancer Neg Hx     Colon cancer Neg Hx      Social History     Socioeconomic History    Marital status:    Tobacco Use    Smoking status: Never     Passive exposure: Past    Smokeless tobacco: Never    Tobacco comments:     Mother smoke home when patient was younger   Vaping Use    Vaping status: Never Used   Substance and Sexual Activity    Alcohol use: Yes     Comment: occasional    Drug use: No    Sexual activity: Yes     Partners: Male     Birth control/protection: Tubal ligation     Allergies   Allergen Reactions    Doxycycline      Chemical esophagitis      Avelox [Moxifloxacin] Rash    Zithromax [Azithromycin] Rash     Prior to Admission medications    Medication Sig Start Date End Date Taking? Authorizing Provider   Airsupra 90-80 MCG/ACT aerosol Inhale 2 puffs Every 4 (Four) Hours As Needed (negro  of breath, cough, wheezing). 7/9/24   Linda Duran APRN   albuterol (ACCUNEB) 1.25 MG/3ML nebulizer solution Take 3 mL by nebulization Every 6 (Six) Hours As Needed for Wheezing or Shortness of Air. 1/13/18   Linda Ceja PA   cetirizine (zyrTEC) 10 MG tablet Take 1 tablet by mouth Daily.    ProviderReshma MD   clonazePAM (KlonoPIN) 0.5 MG tablet Take 1 tablet by mouth As Needed. 11/16/23   Reshma Mcgrath MD   diazePAM (VALIUM) 5 MG tablet Take 1 tablet by mouth 3 times a day. 4/22/25   Reshma Mcgrath MD   EPINEPHrine (EPIPEN) 0.3 MG/0.3ML solution auto-injector injection  12/11/23   Reshma Mcgrath MD   estradiol (Vagifem) 10 MCG tablet vaginal tablet Insert 1 tablet into the vagina 2 (Two) Times a Week for 336 days.  Patient not taking: Reported on 5/6/2025 3/20/25 2/19/26  Heike Bailey MD   flunisolide (NASALIDE) 25 MCG/ACT (0.025%) solution nasal spray Inhale 2 sprays Every 12 (Twelve) Hours.    ProviderReshma MD   ipratropium (ATROVENT) 0.02 % nebulizer solution Take 2.5 mL by nebulization 4 (Four) Times a Day As Needed for Wheezing or Shortness of Air. 11/27/23   Linda Duran APRN   levalbuterol (XOPENEX HFA) 45 MCG/ACT inhaler Inhale 2 puffs 4 (Four) Times a Day As Needed for Wheezing or Shortness of Air. 11/27/23   Linda Duran APRN   losartan (COZAAR) 50 MG tablet Take 1 tablet by mouth Every 12 (Twelve) Hours. 3/19/25   Reshma Mcgrath MD   Lumateperone Tosylate (Caplyta) 21 MG capsule Take  by mouth.    Reshma Mcgrath MD   montelukast (SINGULAIR) 10 MG tablet Take 1 tablet by mouth Every Night. 6/30/23   Linda Duran APRN   NON FORMULARY Inject 10 Units under the skin into the appropriate area as directed 1 (One) Time Per Week. CargraSima for Diabetes    Provider, MD Reshma   omeprazole (priLOSEC) 40 MG capsule Take 1 capsule by mouth Daily. 5/28/18   Mary Delacruz MD   ondansetron (ZOFRAN) 4 MG tablet  Take 1 tablet by mouth 3 times a day. 3/1/25   Provider, MD Reshma   sertraline (ZOLOFT) 50 MG tablet Take 1 tablet by mouth Daily. 11/9/23   ProviderReshma MD   Tezepelumab-ekko (Tezspire) 210 MG/1.91ML solution auto-injector Inject 1.91 mL under the skin into the appropriate area as directed Every 28 (Twenty-Eight) Days. **See note to pharmacy for copay card information** 2/28/25   Linda Duran APRN   Tredelma Ellipta 200-62.5-25 MCG/ACT inhaler INHALE 1 PUFF BY MOUTH EVERY DAY 3/27/24   Linda Duran APRN         Temp:  [98.3 °F (36.8 °C)] 98.3 °F (36.8 °C)  Heart Rate:  [70] 70  Resp:  [16] 16  BP: (138)/(97) 138/97  Neurological Exam  Mental Status  Awake, alert and oriented to person, place and time. Oriented to person, place, time and situation. Recent and remote memory are intact. Speech is normal. Language is fluent with no aphasia. Attention and concentration are normal.    Cranial Nerves  CN II: Visual fields full to confrontation.  CN III, IV, VI: Extraocular movements intact bilaterally. Pupils equal round and reactive to light bilaterally.  CN V:  Left: Diminished sensation of the entire left side of the face.  CN VII: Full and symmetric facial movement.  CN VIII: Hearing appears intact.  CN XII: Tongue midline without atrophy or fasciculations.    Motor  Normal muscle bulk throughout. Increased muscle tone. LLE. Strength is 5/5 in all four extremities except as noted.  LUE 3/5, with drift, exam appears effort dependent, does not withdrawal to noxious stimuli  LLE 0/5, exam appears effort dependent, does not withdraw to noxious stimuli.    Sensory  Light touch abnormality: Reported decree sensation to left face, arm and leg.  No right-sided hemispatial neglect. No left-sided hemispatial neglect.    Coordination    No dysmetria noted.    Gait    Not observed.      Physical Exam  Eyes:      Extraocular Movements: Extraocular movements intact.      Pupils: Pupils are equal, round,  and reactive to light.   Psychiatric:         Speech: Speech normal.         Acute Stroke Data    Thrombolytic Inclusion / Exclusion Criteria    Time: 12:37 EDT  Person Administering Scale: GEGE Salas      YES NO INCLUSION CRITERIA CLASS I   [] [x]   Suspected diagnosis of acute ischemic stroke with measureable neurological deficit.  Low NIHSS with disabling stroke symptoms.   [] [x]   Onset of stroke symptoms < 3 hours before beginning treatment >/ 18 years old  Stroke symptom onset = time patient was last seen well or without symptoms (LKW)   [] [x]   Onset of symptoms between 3-4.5 hours: >/= 80 years old (safe Class IIa) with history of   both diabetes and prior CVA  (reasonable Class IIb) AND NIHSS </= 25  *If not eligible for IV Thrombolytic consider neuro intervention for LKW within 24 hours     YES NO EXCLUSION CRITERIA (CONTRAINDICATIONS) CLASS III EVIDENCE HARM   [] []   Blood pressure >185/110 medically refractory to IV medications   [] []   Active bleeding at a non-compressible site   [] []   Active intracranial hemorrhage (ICH)   [] []   Symptoms suggestive of subarachnoid hemorrhage (SAH)   [] []   GI bleed within 21 days   [] []   Ischemic stroke within 3 months   [] []   Severe head trauma within 3 months    [] []   Intracranial or intraspinal surgery within 3 months   [] []   Current GI malignancy   [] []   Intracranial neoplasm   [] []   Infective endocarditis   [] []   Aortic arch dissection   [] []   Active coagulopathy with  INR >1.7, platelets <100,000, PTT > 40 sec, PT > 15 sec   *For warfarin, administration can begin before blood tests resulted. Discontinue for above values.    [] []   Treatment dose* of LMWH (Lovenox) in last 24 hours  *prophylactic dosages are not a contraindication   [] []   Concurrent use of antiplatelet agents' glycoprotein inhibitors IIb/IIIa (Integrilin, etc.)   [] []   Thrombin or factor Xa inhibitors (Eliquis, Xarelto, Arixtra) taken in last 48 hours     YES  NO CLASS II: AIS WITH THE FOLLOWING CONDITIONS -   TREATMENT RISKS SHOULD BE WEIGHED AGAINST POSSIBLE BENEFITS.    [] []   Major trauma in last 14 days, recent major surgery in last 14 days, intracranial arterial dissection, giant unruptured and unsecured intracranial aneurysm, pericarditis     [] []   The risks and benefits have been discussed with the patient or family related to the administration of IV thrombolytic therapy for stroke symptoms.   [] []   I have discussed and reviewed the patient's case and imaging with the attending prior to IV thrombolytic therapy.   TIME N/A Time IV thrombolytic administered       Hospital Meds:  Scheduled-    Infusions-     PRNs-   sodium chloride    Functional Status Prior to Current Stroke/Karen Score: 0    NIH Stroke Scale  Time: 12:37 EDT  Person Administering Scale: GEGE Salas     1a. Level of Consciousness: 0-->Alert, keenly responsive  1b. LOC Questions: 0-->Answers both questions correctly  1c. LOC Commands: 0-->Performs both tasks correctly  2. Best Gaze: 0-->Normal  3. Visual: 0-->No visual loss  4. Facial Palsy: 0-->Normal symmetrical movements  5a. Motor Arm, Left: 2-->Some effort against gravity, limb cannot get to or maintain (if cued) 90 (or 45) degrees, drifts down to bed, but has some effort against gravity (Appear effort dependent)  5b. Motor Arm, Right: 0-->No drift, limb holds 90 (or 45) degrees for full 10 secs  6a. Motor Leg, Left: 4-->No movement (Appears effort dependent)  6b. Motor Leg, Right: 0-->No drift, leg holds 30 degree position for full 5 secs  7. Limb Ataxia: 0-->Absent  8. Sensory: 1-->Mild-to-moderate sensory loss, patient feels pinprick is less sharp or is dull on the affected side, or there is a loss of superficial pain with pinprick, but patient is aware of being touched  9. Best Language: 0-->No aphasia, normal  10. Dysarthria: 0-->Normal  11. Extinction and Inattention (formerly Neglect): 0-->No abnormality    Total (NIH Stroke  Scale): 7      Results Reviewed:  I have personally reviewed current lab, radiology, and data.  CT Angiogram Head w AI Analysis of LVO  Result Date: 5/14/2025  Impression: Major arterial vasculature within head and neck appears widely patent, with no hemodynamically significant stenosis, dissection, thrombus, or aneurysm. Electronically Signed: Rico Thibodeaux MD  5/14/2025 1:10 PM EDT  Workstation ID: [id number]    CT Angiogram Neck  Result Date: 5/14/2025  Impression: Major arterial vasculature within head and neck appears widely patent, with no hemodynamically significant stenosis, dissection, thrombus, or aneurysm. Electronically Signed: Rico Thibodeaux MD  5/14/2025 1:10 PM EDT  Workstation ID: [id number]    CT CEREBRAL PERFUSION WITH & WITHOUT CONTRAST  Result Date: 5/14/2025  Impression: Examination appears within normal limits. Electronically Signed: Rico Thibodeaux MD  5/14/2025 12:49 PM EDT  Workstation ID: [id number]    CT Head Without Contrast Stroke Protocol  Result Date: 5/14/2025  Impression: No acute intracranial findings. Electronically Signed: Abraham Kent MD  5/14/2025 12:35 PM EDT  Workstation ID: [id number]    WBC   Date Value Ref Range Status   05/14/2025 9.01 3.40 - 10.80 10*3/mm3 Final     RBC   Date Value Ref Range Status   05/14/2025 4.64 3.77 - 5.28 10*6/mm3 Final     Hemoglobin   Date Value Ref Range Status   05/14/2025 14.8 12.0 - 15.9 g/dL Final     Hematocrit   Date Value Ref Range Status   05/14/2025 44.0 34.0 - 46.6 % Final     MCV   Date Value Ref Range Status   05/14/2025 94.8 79.0 - 97.0 fL Final     MCH   Date Value Ref Range Status   05/14/2025 31.9 26.6 - 33.0 pg Final     MCHC   Date Value Ref Range Status   05/14/2025 33.6 31.5 - 35.7 g/dL Final     RDW   Date Value Ref Range Status   05/14/2025 11.9 (L) 12.3 - 15.4 % Final     RDW-SD   Date Value Ref Range Status   05/14/2025 41.1 37.0 - 54.0 fl Final     MPV   Date Value Ref Range Status   05/14/2025 9.5 6.0 - 12.0 fL Final      Platelets   Date Value Ref Range Status   05/14/2025 349 140 - 450 10*3/mm3 Final     Neutrophil %   Date Value Ref Range Status   05/14/2025 65.3 42.7 - 76.0 % Final     Lymphocyte %   Date Value Ref Range Status   05/14/2025 21.4 19.6 - 45.3 % Final     Monocyte %   Date Value Ref Range Status   05/14/2025 9.2 5.0 - 12.0 % Final     Eosinophil %   Date Value Ref Range Status   05/14/2025 2.8 0.3 - 6.2 % Final     Basophil %   Date Value Ref Range Status   05/14/2025 0.7 0.0 - 1.5 % Final     Immature Grans %   Date Value Ref Range Status   05/14/2025 0.6 (H) 0.0 - 0.5 % Final     Neutrophils, Absolute   Date Value Ref Range Status   05/14/2025 5.89 1.70 - 7.00 10*3/mm3 Final     Lymphocytes, Absolute   Date Value Ref Range Status   05/14/2025 1.93 0.70 - 3.10 10*3/mm3 Final     Monocytes, Absolute   Date Value Ref Range Status   05/14/2025 0.83 0.10 - 0.90 10*3/mm3 Final     Eosinophils, Absolute   Date Value Ref Range Status   05/14/2025 0.25 0.00 - 0.40 10*3/mm3 Final     Basophils, Absolute   Date Value Ref Range Status   05/14/2025 0.06 0.00 - 0.20 10*3/mm3 Final     Immature Grans, Absolute   Date Value Ref Range Status   05/14/2025 0.05 0.00 - 0.05 10*3/mm3 Final     nRBC   Date Value Ref Range Status   05/14/2025 0.0 0.0 - 0.2 /100 WBC Final     Lab Results   Component Value Date    GLUCOSE 83 02/20/2025    BUN 11 02/20/2025    CREATININE 0.86 02/20/2025     02/20/2025    K 4.3 02/20/2025     02/20/2025    CALCIUM 9.6 02/20/2025    PROTEINTOT 6.9 02/20/2025    ALBUMIN 4.4 02/20/2025    ALT 55 (H) 05/14/2025    AST 30 05/14/2025    ALKPHOS 29 (L) 02/20/2025    BILITOT 0.3 02/20/2025    GLOB 2.5 02/20/2025    AGRATIO 1.8 02/20/2025    BCR 12.8 02/20/2025    ANIONGAP 12.0 06/12/2024    EGFR 86.1 02/20/2025     Assessment/Plan:    This is a 44-year-old female with past medical history of migraines/complex migraine, asthma, anxiety and depression, HTN, HLD presents to [facility] for  further evaluation of transient left-sided weakness and seizure-like activity. She is not a candidate for IV thrombolytic therapy as seizure-like activity was witnessed and patient did have symptoms yesterday.  She is not a candidate for neurovascular intervention as there is no LVO on CT scan.    Antiplatelet PTA: None  Anticoagulant PTA: None        # Seizure like activity  # Left sided weakness  -MRI brain without pending   - EEG pending  - Migraine cocktail  - N.p.o. until dysphagia screen passed  - Seizure precautions    Plan of care discussed with patient and Dr. Sánchez.  Stroke neurology will provide further recommendations when MRI results.  Thank you for this consult.      Mikayla Camara, APRN  May 14, 2025  12:37 EDT     Addendum 1727 EST    EEG resulted unremarkable.  MRI also resulted unremarkable.  After migraine cocktail patient's symptoms had resolved and she felt completely back to normal.  She tells me she is comfortable going home and agrees to follow-up with general neurology outpatient.    MRI Brain Without Contrast  Result Date: 5/14/2025  Impression: 1. No acute intracranial abnormality 2. Mild paranasal sinus disease Electronically Signed: Ted Peter MD  5/14/2025 4:38 PM EDT  Workstation ID: [id number]    EEG  Result Date: 5/14/2025  Normal study This report is transcribed using the Dragon dictation system.        # Transient left-sided weakness  # Seizure-like activity  Etiology of patient's symptoms are likely complex migraine versus FNS.    -Follow-up with general neurology outpatient    Plan of care discussed with Dr. Dumont (general neurology), Dr. Sánchez (emergency) patient and family at the bedside.  From stroke neurology standpoint patient is okay to be discharged home when cleared by emergency department team.  Thank you for this consult

## 2025-05-14 NOTE — ED PROVIDER NOTES
Subjective   History of Present Illness  Patient is a pleasant 44-year-old female, works as a nurse in the NICU at , presents to the emergency department with seizure-like activity and left-sided weakness and mild sensory deficit.  She states that approximately 10:40 AM she was sitting with her daughter and they are watching a TV show.  She had just made brunch.  She had a clinching of the muscles on her left side of the body including her left arm and leg.  This followed by left-sided weakness and mild numbness.  According to EMS they also witnessed one of the episodes where she clenched her left arm up.  The muscles tightened, but after just couple minutes they relaxed.  The patient was not completely unresponsive during the episode and she did not have any postictal symptoms.    Patient states that yesterday she experienced a 5-minute episode of weakness on her left upper and lower extremities.  It self resolved and she did not come to the hospital at that time.  She does have a history of migraines and complex migraines but states that she has not had a headache today.  Denies fever, chest pain, shortness of breath, abdominal pain, vomiting, diarrhea, or other acute complaints.        Review of Systems   All other systems reviewed and are negative.      Past Medical History:   Diagnosis Date    Allergic rhinitis     Anxiety and depression 2006    situational;  was deployed    Asthma     Asthma, extrinsic Childood    Asthma, intrinsic     Childhood    Diabetes mellitus     gestational    GERD (gastroesophageal reflux disease)     Gestational diabetes     Hyperlipidemia 3/9/18    Hypertension 2008    Had preeclampsia and CHTN since then    Migraines     Pneumonia Childhood    Vitamin D deficiency 2016       Allergies   Allergen Reactions    Doxycycline      Chemical esophagitis      Avelox [Moxifloxacin] Rash    Zithromax [Azithromycin] Rash       Past Surgical History:   Procedure Laterality Date      SECTION       SECTION WITH TUBAL Bilateral 2018    Procedure:  SECTION REPEAT WITH TUBAL * 37 WKS - GEST. DIABETIC, ELEVATED BPS*;  Surgeon: Terrence Dutta MD;  Location: [facility] LABOR DELIVERY;  Service: Obstetrics/Gynecology    WISDOM TOOTH EXTRACTION         Family History   Problem Relation Age of Onset    Uterine cancer Mother         possibly    Breast cancer Mother 40        Lumpectomy    Hypothyroidism Mother     Thyroid disease Mother     Hypothyroidism Father     Thyroid disease Father     Asthma Sister     Hypothyroidism Sister     Asthma Sister     Diabetes Sister     Hypothyroidism Brother     Asthma Brother     Asthma Brother     Cancer Maternal Grandmother         Lung CA    Cancer Maternal Grandfather         Lung CA    Diabetes Sister     Thyroid disease Sister     Thyroid disease Brother     Ovarian cancer Neg Hx     Colon cancer Neg Hx        Social History     Socioeconomic History    Marital status:    Tobacco Use    Smoking status: Never     Passive exposure: Past    Smokeless tobacco: Never    Tobacco comments:     Mother smoke home when patient was younger   Vaping Use    Vaping status: Never Used   Substance and Sexual Activity    Alcohol use: Yes     Comment: occasional    Drug use: No    Sexual activity: Yes     Partners: Male     Birth control/protection: Tubal ligation           Objective   Physical Exam  Vitals and nursing note reviewed.   Constitutional:       General: She is not in acute distress.     Appearance: She is not ill-appearing.   HENT:      Head: Normocephalic and atraumatic.   Eyes:      Conjunctiva/sclera: Conjunctivae normal.      Pupils: Pupils are equal, round, and reactive to light.   Cardiovascular:      Rate and Rhythm: Normal rate and regular rhythm.      Heart sounds: Normal heart sounds.   Pulmonary:      Effort: Pulmonary effort is normal. No respiratory distress.      Breath sounds: Normal breath sounds.    Abdominal:      Palpations: Abdomen is soft.      Tenderness: There is no abdominal tenderness.   Musculoskeletal:         General: Normal range of motion.      Cervical back: Normal range of motion and neck supple.   Skin:     General: Skin is warm and dry.      Capillary Refill: Capillary refill takes less than 2 seconds.   Neurological:      Mental Status: She is alert and oriented to person, place, and time.      Cranial Nerves: No cranial nerve deficit.      Sensory: Sensory deficit present.      Motor: Weakness present.      Comments: Patient has drift of her left upper extremity and cannot raise the left lower extremity off the bed. Patient is sensitive to light touch bilaterally but sensation to the left face, left upper, and left lower extremity is slightly diminished relative to the right.     Psychiatric:         Behavior: Behavior normal.         Procedures           ED Course  ED Course as of 05/17/25 1915   Wed May 14, 2025   1253 Patient had an episode while in CT scan.  She was staring at the ceiling was not responsive to stimuli.  Deep sternal rub did fully arouse her.  She did not have any postictal period and was completely oriented and conversant immediately upon waking. [CP]   4644 Patient was evaluated by neurology stroke team.  Neuro attending consult with her.  Workup is reassuring.  MRI does not show any acute pathology.  Resolution with painful stimuli and no postictal symptoms.  Epileptic seizures are less likely.  Anticoagulant medications are not recommended by neurology at this time.  Patient will follow-up at the neurology clinic.  She will follow-up with her primary care provider.  She understands to monitor symptoms have a low threshold to return to the emergency department if symptoms persist, worsen, or other concerns arise. [CP]      ED Course User Index  [CP] Eleuterio Sánchez, DO      No results found for this or any previous visit (from the past 24 hours).  Note: In addition to lab  results from this visit, the labs listed above may include labs taken at another facility or during a different encounter within the last 24 hours. Please correlate lab times with ED admission and discharge times for further clarification of the services performed during this visit.    MRI Brain Without Contrast   Final Result   Impression:      1. No acute intracranial abnormality      2. Mild paranasal sinus disease            Electronically Signed: Ted Peter MD     5/14/2025 4:38 PM EDT     Workstation ID: [id number]      XR Chest 1 View   Final Result   Impression:   No acute cardiopulmonary findings.             Electronically Signed: Abraham Kent MD     5/14/2025 1:20 PM EDT     Workstation ID: [id number]      CT Angiogram Head w AI Analysis of LVO   Final Result   Impression:   Major arterial vasculature within head and neck appears widely patent, with no hemodynamically significant stenosis, dissection, thrombus, or aneurysm.            Electronically Signed: Rico Thibodeaux MD     5/14/2025 1:10 PM EDT     Workstation ID: [id number]      CT Angiogram Neck   Final Result   Impression:   Major arterial vasculature within head and neck appears widely patent, with no hemodynamically significant stenosis, dissection, thrombus, or aneurysm.            Electronically Signed: Rico Thibodeaux MD     5/14/2025 1:10 PM EDT     Workstation ID: [id number]      CT CEREBRAL PERFUSION WITH & WITHOUT CONTRAST   Final Result   Impression:   Examination appears within normal limits.            Electronically Signed: Rico Thibodeaux MD     5/14/2025 12:49 PM EDT     Workstation ID: [id number]      CT Head Without Contrast Stroke Protocol   Final Result   Impression:   No acute intracranial findings.         Electronically Signed: Abraham Kent MD     5/14/2025 12:35 PM EDT     Workstation ID: [id number]        Vitals:    05/14/25 1530 05/14/25 1700 05/14/25 1730 05/14/25 1800   BP: 122/73 112/79     BP Location:       Patient  Position:       Pulse: 81 66 66 70   Resp:       Temp:       SpO2: 90% 94% 95% 92%   Weight:       Height:         Medications   iopamidol (ISOVUE-370) 76 % injection 100 mL (115 mL Intravenous Given 5/14/25 1244)   sodium chloride 0.9 % bolus 1,000 mL (0 mL Intravenous Stopped 5/14/25 1502)   diphenhydrAMINE (BENADRYL) injection 25 mg (25 mg Intravenous Given 5/14/25 1315)   metoclopramide (REGLAN) injection 10 mg (10 mg Intravenous Given 5/14/25 1314)   magnesium sulfate in D5W 1g/100mL (PREMIX) (0 g Intravenous Stopped 5/14/25 1502)   acetaminophen (TYLENOL) tablet 1,000 mg (1,000 mg Oral Given 5/14/25 1317)     ECG/EMG Results (last 24 hours)       Procedure Component Value Units Date/Time    ECG 12 Lead ED Triage Standing Order; Acute Stroke (Onset <24 hrs) [[id number]] Collected: 05/14/25 1309     Updated: 05/14/25 1311     QT Interval 418 ms      QTC Interval 447 ms     Narrative:      Test Reason : ED Triage Standing Order~  Blood Pressure :   */*   mmHG  Vent. Rate :  69 BPM     Atrial Rate :  69 BPM     P-R Int : 170 ms          QRS Dur :  94 ms      QT Int : 418 ms       P-R-T Axes :   7   3  31 degrees    QTcB Int : 447 ms    Normal sinus rhythm  Normal ECG  When compared with ECG of 12-Jun-2024 09:16,  No significant change was found    Referred By: EDMD           Confirmed By:           ECG 12 Lead ED Triage Standing Order; Acute Stroke (Onset <24 hrs)   Preliminary Result   Test Reason : ED Triage Standing Order~   Blood Pressure :   */*   mmHG   Vent. Rate :  69 BPM     Atrial Rate :  69 BPM      P-R Int : 170 ms          QRS Dur :  94 ms       QT Int : 418 ms       P-R-T Axes :   7   3  31 degrees     QTcB Int : 447 ms      Normal sinus rhythm   Normal ECG   When compared with ECG of 12-Jun-2024 09:16,   No significant change was found      Referred By: EDMD           Confirmed By:                                                            Medical Decision Making  Patient arose from the  semiunresponsive state with painful stimuli/sternal rub.  She had no additional episodes while being observed in the ED.  Workup was reassuring.  She was seen by the neurology stroke team.  Outpatient follow-up recommended.  No new medications at this time.  Patient is comfortable with this plan.  Understands to monitor symptoms and have a low threshold to return to the emergency department if symptoms persist, worsen, or other concerns arise.    Problems Addressed:  Functional neurologic complaint: complicated acute illness or injury  Seizure-like activity: complicated acute illness or injury    Amount and/or Complexity of Data Reviewed  External Data Reviewed: notes.  Labs: ordered. Decision-making details documented in ED Course.  Radiology: ordered and independent interpretation performed. Decision-making details documented in ED Course.  ECG/medicine tests: ordered and independent interpretation performed. Decision-making details documented in ED Course.    Risk  Prescription drug management.        Final diagnoses:   Seizure-like activity   Functional neurologic complaint       ED Disposition  ED Disposition       ED Disposition   Discharge    Condition   Stable    Comment   --               DISCHARGE    Patient discharged in stable condition.    Reviewed implications of results, diagnosis, meds, responsibility to follow up, warning signs and symptoms of possible worsening, potential complications and reasons to return to ER.    Patient/Family voiced understanding of above instructions.    Discussed plan for discharge, as there is no emergent indication for admission.  Pt/family is agreeable and understands need for follow up and possible repeat testing.  Pt/family is aware that discharge does not mean that nothing is wrong but that it indicates no emergency is currently present that requires admission and they must continue care with follow-up as given below or with a physician of their choice.      FOLLOW-UP  Megan Sabillon, APRN  [address]  [facility] [address]  [phone]    Schedule an appointment as soon as possible for a visit       [facility] NEUROLOG[facility]  [address]  [facility] [address] [phone]  Schedule an appointment as soon as possible for a visit            Medication List      No changes were made to your prescriptions during this visit.            Eleuterio Sánchez,   05/17/25 1917

## 2025-05-14 NOTE — DISCHARGE INSTRUCTIONS
The etiology of your event today is not clear.  Workup is very reassuring including a normal MRI.  Symptoms have resolved Aslam who recommended she follow-up with neurology clinic along with follow-up with your primary care provider.  Monitor your symptoms closely.  Have a low threshold to return to the emergency department if symptoms persist, worsen, or other concerns arise.

## 2025-05-29 LAB
QT INTERVAL: 418 MS
QTC INTERVAL: 447 MS

## 2025-06-02 ENCOUNTER — TELEPHONE (OUTPATIENT)
Dept: PULMONOLOGY | Facility: CLINIC | Age: 45
End: 2025-06-02

## 2025-06-02 NOTE — TELEPHONE ENCOUNTER
Hub staff attempted to follow warm transfer process and was unsuccessful     Caller: Janell Terrell    Relationship to patient: Self    Best call back number:  [phone]    Patient is needing: PT IS DUE FOR HER TEZSPIRE TOMORROW BUT IS TAKING ANTIBIOTICS AND STEROIDS TODAY 06/02/2025 AND IS ASKING IF SHE NEEDS TO HOLD OFF ON TAKING HER TEZSPIRE TOMORROW 06/03/2025. PLEASE CALL AND ADVISE.

## 2025-06-26 ENCOUNTER — SPECIALTY PHARMACY (OUTPATIENT)
Dept: PHARMACY | Facility: HOSPITAL | Age: 45
End: 2025-06-26
Payer: COMMERCIAL

## 2025-06-26 NOTE — PROGRESS NOTES
[facility]ulatory Care Clinic  Specialty Pharmacy Patient Management Program  Asthma Reassessment     Janell Terrell is a 44 y.o. female with asthma seen by a pulmonology provider and enrolled in the Pulmonology Patient Management Program offered by [facility].  A follow-up outreach was conducted, including assessment of continued therapy appropriateness, medication adherence, and side effect incidence and management for Tezspire.    Patient has already tried Nucala and Dupixent. Patient continues to use Trelegy. She reports that she uses albuterol PRN. She wasn't using her rescue before she had a migraine, but now is taking it about 4 times daily since she has been sick recently. She has been sick lately and had 2 weeks of steroids.     Changes to Insurance Coverage or Financial Support  No Changes to insurance coverage.    Relevant Past Medical History and Comorbidities  Relevant medical history and concomitant health conditions were discussed with the patient. The patient's chart has been reviewed for relevant past medical history and comorbid health conditions and updated as necessary.   Past Medical History:   Diagnosis Date    Allergic rhinitis     Anxiety and depression 2006    situational;  was deployed    Asthma     Asthma, extrinsic Childood    Asthma, intrinsic     Childhood    Diabetes mellitus     gestational    GERD (gastroesophageal reflux disease)     Gestational diabetes     Hyperlipidemia 3/9/18    Hypertension 2008    Had preeclampsia and CHTN since then    Migraines     Pneumonia Childhood    Vitamin D deficiency 2016     Social History     Socioeconomic History    Marital status:    Tobacco Use    Smoking status: Never     Passive exposure: Past    Smokeless tobacco: Never    Tobacco comments:     Mother smoke home when patient was younger   Vaping Use    Vaping status: Never Used   Substance and Sexual Activity    Alcohol use: Yes     Comment: occasional     Drug use: No    Sexual activity: Yes     Partners: Male     Birth control/protection: Tubal ligation          Hospitalizations and Urgent Care Since Last Assessment  ED Visits, Admissions, or Hospitalizations: None   Urgent Office Visits: no     Allergies  Known allergies and reactions were discussed with the patient. The patient's chart has been reviewed for allergy information and updated as necessary.   Allergies   Allergen Reactions    Doxycycline      Chemical esophagitis      Avelox [Moxifloxacin] Rash    Zithromax [Azithromycin] Rash          Relevant Laboratory Values  Common labs          2/20/2025    10:25 5/14/2025    12:22 5/14/2025    17:45   Common Labs   Glucose 83      BUN 11      Creatinine 0.86   0.80    Sodium 141      Potassium 4.3      Chloride 103      Calcium 9.6      Albumin 4.4      Total Bilirubin 0.3      Alkaline Phosphatase 29      AST (SGOT) 29  30     ALT (SGPT) 56  55     WBC 9.12  9.01     Hemoglobin 14.7  14.8  13.9    Hematocrit 41.9  44.0  41    Platelets 386  349     Total Cholesterol 194      Triglycerides 138      HDL Cholesterol 42      LDL Cholesterol  127          Lab Assessment  The above labs have been reviewed. No dose adjustments are needed for the specialty medication(s) based on the labs.     Current Medication List  This medication list has been reviewed with the patient and evaluated for any interactions or necessary modifications/recommendations, and updated to include all prescription medications, OTC medications, and supplements the patient is currently taking.  This list reflects what is contained in the patient's profile, which has also been marked as reviewed to communicate to other providers it is the most up to date version of the patient's current medication therapy.     Current Outpatient Medications:     Airsupra 90-80 MCG/ACT aerosol, Inhale 2 puffs Every 4 (Four) Hours As Needed (shortness of breath, cough, wheezing)., Disp: 10.7 g, Rfl: 3     albuterol (ACCUNEB) 1.25 MG/3ML nebulizer solution, Take 3 mL by nebulization Every 6 (Six) Hours As Needed for Wheezing or Shortness of Air., Disp: 90 mL, Rfl: 0    cetirizine (zyrTEC) 10 MG tablet, Take 1 tablet by mouth Daily., Disp: , Rfl:     clonazePAM (KlonoPIN) 0.5 MG tablet, Take 1 tablet by mouth As Needed., Disp: , Rfl:     diazePAM (VALIUM) 5 MG tablet, Take 1 tablet by mouth 3 times a day., Disp: , Rfl:     EPINEPHrine (EPIPEN) 0.3 MG/0.3ML solution auto-injector injection, , Disp: , Rfl:     estradiol (Vagifem) 10 MCG tablet vaginal tablet, Insert 1 tablet into the vagina 2 (Two) Times a Week for 336 days. (Patient not taking: Reported on 5/6/2025), Disp: 8 tablet, Rfl: 11    flunisolide (NASALIDE) 25 MCG/ACT (0.025%) solution nasal spray, Inhale 2 sprays Every 12 (Twelve) Hours., Disp: , Rfl:     ipratropium (ATROVENT) 0.02 % nebulizer solution, Take 2.5 mL by nebulization 4 (Four) Times a Day As Needed for Wheezing or Shortness of Air., Disp: 2.5 mL, Rfl: 5    levalbuterol (XOPENEX HFA) 45 MCG/ACT inhaler, Inhale 2 puffs 4 (Four) Times a Day As Needed for Wheezing or Shortness of Air., Disp: 15 g, Rfl: 5    losartan (COZAAR) 50 MG tablet, Take 1 tablet by mouth Every 12 (Twelve) Hours., Disp: , Rfl:     Lumateperone Tosylate (Caplyta) 21 MG capsule, Take  by mouth., Disp: , Rfl:     montelukast (SINGULAIR) 10 MG tablet, Take 1 tablet by mouth Every Night., Disp: 30 tablet, Rfl: 5    NON FORMULARY, Inject 10 Units under the skin into the appropriate area as directed 1 (One) Time Per Week. CargraSima for Diabetes, Disp: , Rfl:     omeprazole (priLOSEC) 40 MG capsule, Take 1 capsule by mouth Daily., Disp: 30 capsule, Rfl: 0    ondansetron (ZOFRAN) 4 MG tablet, Take 1 tablet by mouth 3 times a day., Disp: , Rfl:     sertraline (ZOLOFT) 50 MG tablet, Take 1 tablet by mouth Daily., Disp: , Rfl:     Tezepelumab-ekko (Tezspire) 210 MG/1.91ML solution auto-injector, Inject 1.91 mL under the skin into the  appropriate area as directed Every 28 (Twenty-Eight) Days. **See note to pharmacy for copay card information**, Disp: 1.91 mL, Rfl: 11    Trelegy Ellipta 200-62.5-25 MCG/ACT inhaler, INHALE 1 PUFF BY MOUTH EVERY DAY, Disp: 60 each, Rfl: 0         Drug Interactions  None     Adverse Drug Reactions                Plan for ADR Management: None      Adherence, Self-Administration, and Current Therapy Problems  Adherence related patient's specialty therapy was discussed with the patient. The Adherence segment of this outreach has been reviewed and updated.        Additional Barriers to Patient Self-Administration: No  Methods for Supporting Patient Self-Administration: No    Recently Close Medication Therapy Problems  No medication therapy recommendations to display  Open Medication Therapy Problems  No medication therapy recommendations to display     Goals of Therapy  Goals related to the patient's specialty therapy was discussed with the patient. The Patient Goals segment of this outreach has been reviewed and updated.    Goals Addressed Today    None          Quality of Life Assessment   Quality of Life related to the patient's enrollment in the patient management program and services provided was discussed with the patient. The QOL segment of this outreach has been reviewed and updated.        Reassessment Plan & Follow-Up  Medication Therapy Changes: No - continue Tezspire every 28 days.  Related Plans, Therapy Recommendations, or Issues to Be Addressed: None  Pharmacist to perform regular reassessments no more than (6) months from the previous assessment.  Care Coordinator to set up future refill outreaches, coordinate prescription delivery, and escalate clinical questions to pharmacist.     Attestation     I attest the patient was actively involved in and has agreed to the above plan of care. If the prescribed therapy is at any point deemed not appropriate based on the current or future assessments, a  consultation will be initiated with the patient's specialty care provider to determine the best course of action. The revised plan of therapy will be documented along with any additional patient education provided. Discussed aforementioned material with patient by phone.    Chelly Rhodes, PharmD  Ambulatory Care Pharmacist  06/26/2025   14:28 EDT

## 2025-07-09 ENCOUNTER — OFFICE VISIT (OUTPATIENT)
Dept: ORTHOPEDIC SURGERY | Facility: CLINIC | Age: 45
End: 2025-07-09
Payer: COMMERCIAL

## 2025-07-09 VITALS
BODY MASS INDEX: 36.32 KG/M2 | WEIGHT: 218 LBS | DIASTOLIC BLOOD PRESSURE: 80 MMHG | SYSTOLIC BLOOD PRESSURE: 132 MMHG | HEIGHT: 65 IN

## 2025-07-09 DIAGNOSIS — M25.511 CHRONIC RIGHT SHOULDER PAIN: Primary | ICD-10-CM

## 2025-07-09 DIAGNOSIS — G89.29 CHRONIC RIGHT SHOULDER PAIN: Primary | ICD-10-CM

## 2025-07-09 DIAGNOSIS — M25.511 ARTHRALGIA OF SHOULDER REGION, RIGHT: ICD-10-CM

## 2025-07-09 RX ORDER — RIMEGEPANT SULFATE 75 MG/75MG
TABLET, ORALLY DISINTEGRATING ORAL
COMMUNITY
Start: 2025-05-23

## 2025-07-09 RX ORDER — FEXOFENADINE HCL 180 MG/1
TABLET ORAL
COMMUNITY
Start: 2025-07-02

## 2025-07-09 RX ORDER — LIDOCAINE HYDROCHLORIDE 20 MG/ML
2 INJECTION, SOLUTION INFILTRATION; PERINEURAL
Status: COMPLETED | OUTPATIENT
Start: 2025-07-09 | End: 2025-07-09

## 2025-07-09 RX ORDER — METHYLPREDNISOLONE ACETATE 40 MG/ML
40 INJECTION, SUSPENSION INTRA-ARTICULAR; INTRALESIONAL; INTRAMUSCULAR; SOFT TISSUE
Status: COMPLETED | OUTPATIENT
Start: 2025-07-09 | End: 2025-07-09

## 2025-07-09 RX ORDER — LAMOTRIGINE 25 MG/1
1 TABLET ORAL EVERY 12 HOURS SCHEDULED
COMMUNITY
Start: 2025-06-17

## 2025-07-09 RX ORDER — LEVOCETIRIZINE DIHYDROCHLORIDE 5 MG/1
TABLET, FILM COATED ORAL
COMMUNITY
Start: 2025-07-02

## 2025-07-09 RX ADMIN — METHYLPREDNISOLONE ACETATE 40 MG: 40 INJECTION, SUSPENSION INTRA-ARTICULAR; INTRALESIONAL; INTRAMUSCULAR; SOFT TISSUE at 10:35

## 2025-07-09 RX ADMIN — LIDOCAINE HYDROCHLORIDE 2 ML: 20 INJECTION, SOLUTION INFILTRATION; PERINEURAL at 10:35

## 2025-07-09 NOTE — PROGRESS NOTES
Office Note     Name: Janell Terrell    : 1980     MRN: [id number]     Chief Complaint  Pain of the Right Shoulder (States she has been having pain for about seven years, it has been getting worse in the last year. No known injury.)    Subjective     History of Present Illness:  Janell Terrell is a 44 y.o. female presenting today for chronic and progressive right shoulder pain ongoing for more than 7 years, worse in the last year.  She denies any inciting or aggravating injury to the shoulder.  The pain is felt in the lateral and in the posterior right shoulder.  The patient rates the pain as 6, dull, aching, and shooting, with radiation into the ipsilateral arm.  The pain is alleviated by ice/heat, rest, and over the counter NSAIDs.  The pain is exacerbated by overhead motions/elevation , lifting, and direct pressure such as laying on the affected side.  The patient denies upper arm paresthesias in the affected extremity.  The patient denies acute neck pain.  The patient denies previous injury/surgery to the affected area.  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  She denies any previous treatment to the right shoulder.      Review of Systems    Past Medical History:   Diagnosis Date    Allergic rhinitis     Anxiety and depression     situational;  was deployed    Asthma     Asthma, extrinsic Childood    Asthma, intrinsic     Childhood    Diabetes mellitus     gestational    GERD (gastroesophageal reflux disease)     Gestational diabetes     Hyperlipidemia 3/9/18    Hypertension     Had preeclampsia and CHTN since then    Migraines     Pneumonia Childhood    Vitamin D deficiency 2016        Past Surgical History:   Procedure Laterality Date     SECTION       SECTION WITH TUBAL Bilateral 2018    Procedure:  SECTION REPEAT WITH TUBAL * 37 WKS - GEST. DIABETIC, ELEVATED BPS*;  Surgeon: Terrence Dutta MD;   Location: [facility] LABOR DELIVERY;  Service: Obstetrics/Gynecology    WISDOM TOOTH EXTRACTION  1998       Family History   Problem Relation Age of Onset    Uterine cancer Mother         possibly    Breast cancer Mother 40        Lumpectomy    Hypothyroidism Mother     Thyroid disease Mother     Hypothyroidism Father     Thyroid disease Father     Asthma Sister     Hypothyroidism Sister     Asthma Sister     Diabetes Sister     Hypothyroidism Brother     Asthma Brother     Asthma Brother     Cancer Maternal Grandmother         Lung CA    Cancer Maternal Grandfather         Lung CA    Diabetes Sister     Thyroid disease Sister     Thyroid disease Brother     Ovarian cancer Neg Hx     Colon cancer Neg Hx        Social History     Socioeconomic History    Marital status:    Tobacco Use    Smoking status: Never     Passive exposure: Past    Smokeless tobacco: Never    Tobacco comments:     Mother smoke home when patient was younger   Vaping Use    Vaping status: Never Used   Substance and Sexual Activity    Alcohol use: Yes     Comment: occasional    Drug use: No    Sexual activity: Yes     Partners: Male     Birth control/protection: Tubal ligation         Current Outpatient Medications:     fexofenadine (ALLEGRA) 180 MG tablet, , Disp: , Rfl:     lamoTRIgine (LaMICtal) 25 MG tablet, Take 1 tablet by mouth Every 12 (Twelve) Hours., Disp: , Rfl:     levocetirizine (XYZAL) 5 MG tablet, , Disp: , Rfl:     Nurtec 75 MG dispersible tablet, DISSOLVE 1 TABLET IN MOUTH EVERY OTHER DAY, Disp: , Rfl:     Airsupra 90-80 MCG/ACT aerosol, Inhale 2 puffs Every 4 (Four) Hours As Needed (shortness of breath, cough, wheezing)., Disp: 10.7 g, Rfl: 3    albuterol (ACCUNEB) 1.25 MG/3ML nebulizer solution, Take 3 mL by nebulization Every 6 (Six) Hours As Needed for Wheezing or Shortness of Air., Disp: 90 mL, Rfl: 0    cetirizine (zyrTEC) 10 MG tablet, Take 1 tablet by mouth Daily., Disp: , Rfl:     clonazePAM (KlonoPIN) 0.5 MG tablet,  "Take 1 tablet by mouth As Needed., Disp: , Rfl:     diazePAM (VALIUM) 5 MG tablet, Take 1 tablet by mouth 3 times a day., Disp: , Rfl:     EPINEPHrine (EPIPEN) 0.3 MG/0.3ML solution auto-injector injection, , Disp: , Rfl:     flunisolide (NASALIDE) 25 MCG/ACT (0.025%) solution nasal spray, Inhale 2 sprays Every 12 (Twelve) Hours., Disp: , Rfl:     ipratropium (ATROVENT) 0.02 % nebulizer solution, Take 2.5 mL by nebulization 4 (Four) Times a Day As Needed for Wheezing or Shortness of Air., Disp: 2.5 mL, Rfl: 5    levalbuterol (XOPENEX HFA) 45 MCG/ACT inhaler, Inhale 2 puffs 4 (Four) Times a Day As Needed for Wheezing or Shortness of Air., Disp: 15 g, Rfl: 5    losartan (COZAAR) 50 MG tablet, Take 1 tablet by mouth Every 12 (Twelve) Hours., Disp: , Rfl:     montelukast (SINGULAIR) 10 MG tablet, Take 1 tablet by mouth Every Night., Disp: 30 tablet, Rfl: 5    NON FORMULARY, Inject 10 Units under the skin into the appropriate area as directed 1 (One) Time Per Week. CargraSima for Diabetes, Disp: , Rfl:     omeprazole (priLOSEC) 40 MG capsule, Take 1 capsule by mouth Daily., Disp: 30 capsule, Rfl: 0    ondansetron (ZOFRAN) 4 MG tablet, Take 1 tablet by mouth 3 times a day., Disp: , Rfl:     sertraline (ZOLOFT) 50 MG tablet, Take 1 tablet by mouth Daily., Disp: , Rfl:     Tezepelumab-ekko (Tezspire) 210 MG/1.91ML solution auto-injector, Inject 1.91 mL under the skin into the appropriate area as directed Every 28 (Twenty-Eight) Days. **See note to pharmacy for copay card information**, Disp: 1.91 mL, Rfl: 11    Trelegy Ellipta 200-62.5-25 MCG/ACT inhaler, INHALE 1 PUFF BY MOUTH EVERY DAY, Disp: 60 each, Rfl: 0    Allergies   Allergen Reactions    Doxycycline      Chemical esophagitis      Avelox [Moxifloxacin] Rash    Zithromax [Azithromycin] Rash         Objective   /80   Ht 165.1 cm (65\")   Wt 98.9 kg (218 lb)   BMI 36.28 kg/m²    Class 2 Severe Obesity (BMI >=35 and <=39.9). Obesity-related health conditions " include the following: Asthma. Obesity is newly identified. BMI is is above average; BMI management plan is completed. We discussed low calorie, low carb based diet program, portion control, increasing exercise, and joining a fitness center or start home based exercise program.       Physical Exam  Right Shoulder Exam     Tenderness   The patient is experiencing no tenderness.    Range of Motion   The patient has normal right shoulder ROM.    Muscle Strength   The patient has normal right shoulder strength.    Tests   Apprehension: negative  Wright test: positive  Cross arm: negative  Impingement: positive  Drop arm: negative    Other   Erythema: absent  Sensation: normal  Pulse: present    Comments:  Positive [place]'s test, negative biceps load 1 and 2.           Upper extremity DVT signs are negative by clinical screen.     Independent Review of Radiographic Studies:    Three-view plain films of the right shoulder were taken in the office today, for the evaluation of pain and joint condition, with no comparison views available.   No acute osseous abnormalities are seen.  There is what appears to be an enchondroma in the proximal humerus that measures approximately 17.5 mm x 14 mm in its greatest dimension.  The proximal humerus enchondroma is also visible on previous chest x-ray from 2022 which measures approximately 12 mm x 5.3 mm at its greatest dimension.  Subacromial space appears to be within normal limits.  Mild degenerative changes at the AC joint.  Small subacromial bone spur.    Discussed plain films of the right shoulder with VIRGINIE Howard, he advises observation for enchondroma lesions less than 4 cm.      Review of unique test(s): 1    Previous chest x-ray from 2022 revealed for comparison of enchondroma seen on today's shoulder films.    Large Joint Arthrocentesis: R subacromial bursa  Date/Time: 7/9/2025 10:35 AM  Consent given by: patient  Site marked: site marked  Timeout: Immediately  prior to procedure a time out was called to verify the correct patient, procedure, equipment, support staff and site/side marked as required   Supporting Documentation  Indications: pain   Procedure Details  Location: shoulder - R subacromial bursa  Preparation: Patient was prepped and draped in the usual sterile fashion  Needle size: 23 G  Medications administered: 40 mg methylPREDNISolone acetate 40 MG/ML; 2 mL lidocaine 2%  Patient tolerance: patient tolerated the procedure well with no immediate complications        Assessment and Plan   Diagnoses and all orders for this visit:    1. Chronic right shoulder pain (Primary)  -     Ambulatory Referral to Physical Therapy for Evaluation & Treatment  -     MRI Shoulder Right Without Contrast    2. Arthralgia of shoulder region, right  -     XR Shoulder 2+ View Right    Other orders  -     Large Joint Arthrocentesis: R subacromial bursa       Discussion of orthopedic goals  Orthopedic activities reviewed and patient expressed appreciation  Risk, benefits, and merits of treatment alternatives reviewed with the patient and questions answered  Patient guided on mobility and conditioning exercises  RICE, heat, and/or modalities as needed and beneficial  Advised patient to call or notify the office for any adverse effect from injection therapy  Physical therapy referral given.  Reduced physical activity as appropriate and avoid aggravating activities.   Weight bearing parameters reviewed.     Recommendations/Plan:  Exercise, medications, injections, other patient advice, and return appointment as noted.  Referral: Physical and Occupational Therapy referral.  Patient and/or guardian(s) were encouraged to call or return to the office for any issues or concerns.    Differential Diagnoses:  Impingement/bursitis , rotator cuff tear/injury, rotator cuff tendinopathy, posterior labral tear    Patient was given a steroid injection into the right subacromial space today for  symptomatic relief.  Recommend OTC analgesics including Tylenol and/or ibuprofen as needed and tolerated for pain.    Patient was given an order for physical therapy today.   MRI ordered for further evaluation of suspected rotator cuff tear versus rotator cuff tendinopathy or posterior labral tear.  MRI ordered due to the chronic and worsening nature of her shoulder pain.  MRI will also provide further evaluation of what appears to be an enchondroma of the proximal humerus.  Advise follow-up with me in approximately 3 months for reevaluation or after MRI, whichever is first.    Return in about 3 months (around 10/9/2025) for Recheck.

## 2025-07-16 ENCOUNTER — PATIENT ROUNDING (BHMG ONLY) (OUTPATIENT)
Dept: ORTHOPEDIC SURGERY | Facility: CLINIC | Age: 45
End: 2025-07-16
Payer: COMMERCIAL

## 2025-07-16 NOTE — PROGRESS NOTES
July 16, 2025    Hello, may I speak with Janell Terrell?    My name is Vanesas      I am  with [facility]  [address] [address] [phone].    Before we get started may I verify your date of birth? 1980    I am calling to officially welcome you to our practice and ask about your recent visit. Is this a good time to talk? No - voicemail left    Tell me about your visit with us. What things went well?         We're always looking for ways to make our patients' experiences even better. Do you have recommendations on ways we may improve?      Overall were you satisfied with your first visit to our practice?        I appreciate you taking the time to speak with me today. Is there anything else I can do for you?       Thank you, and have a great day.

## 2025-07-18 ENCOUNTER — TRANSCRIBE ORDERS (OUTPATIENT)
Dept: LAB | Facility: HOSPITAL | Age: 45
End: 2025-07-18
Payer: COMMERCIAL

## 2025-07-18 ENCOUNTER — OFFICE VISIT (OUTPATIENT)
Dept: PULMONOLOGY | Facility: CLINIC | Age: 45
End: 2025-07-18
Payer: COMMERCIAL

## 2025-07-18 ENCOUNTER — LAB (OUTPATIENT)
Dept: LAB | Facility: HOSPITAL | Age: 45
End: 2025-07-18
Payer: COMMERCIAL

## 2025-07-18 VITALS
HEIGHT: 65 IN | DIASTOLIC BLOOD PRESSURE: 68 MMHG | WEIGHT: 213.8 LBS | HEART RATE: 78 BPM | SYSTOLIC BLOOD PRESSURE: 114 MMHG | BODY MASS INDEX: 35.62 KG/M2 | OXYGEN SATURATION: 96 %

## 2025-07-18 DIAGNOSIS — J30.89 OTHER ALLERGIC RHINITIS: ICD-10-CM

## 2025-07-18 DIAGNOSIS — J45.50 SEVERE PERSISTENT ASTHMA, UNCOMPLICATED: ICD-10-CM

## 2025-07-18 DIAGNOSIS — H10.45 OTHER CHRONIC ALLERGIC CONJUNCTIVITIS, UNSPECIFIED LATERALITY: ICD-10-CM

## 2025-07-18 DIAGNOSIS — R06.02 SHORTNESS OF BREATH: Primary | ICD-10-CM

## 2025-07-18 DIAGNOSIS — J30.81 ALLERGIC RHINITIS DUE TO ANIMAL (CAT) (DOG) HAIR AND DANDER: Primary | ICD-10-CM

## 2025-07-18 DIAGNOSIS — J30.81 ALLERGIC RHINITIS DUE TO ANIMAL (CAT) (DOG) HAIR AND DANDER: ICD-10-CM

## 2025-07-18 DIAGNOSIS — J30.1 ALLERGIC RHINITIS DUE TO POLLEN, UNSPECIFIED SEASONALITY: ICD-10-CM

## 2025-07-18 DIAGNOSIS — J45.50 SEVERE PERSISTENT ASTHMA WITHOUT COMPLICATION: ICD-10-CM

## 2025-07-18 DIAGNOSIS — J30.9 ALLERGIC RHINITIS, UNSPECIFIED SEASONALITY, UNSPECIFIED TRIGGER: ICD-10-CM

## 2025-07-18 LAB
ALBUMIN SERPL-MCNC: 4.5 G/DL (ref 3.5–5.2)
ALBUMIN/GLOB SERPL: 1.5 G/DL
ALP SERPL-CCNC: 28 U/L (ref 39–117)
ALT SERPL W P-5'-P-CCNC: 34 U/L (ref 1–33)
ANION GAP SERPL CALCULATED.3IONS-SCNC: 12 MMOL/L (ref 5–15)
AST SERPL-CCNC: 22 U/L (ref 1–32)
BASOPHILS # BLD AUTO: 0.09 10*3/MM3 (ref 0–0.2)
BASOPHILS NFR BLD AUTO: 0.8 % (ref 0–1.5)
BILIRUB SERPL-MCNC: 0.4 MG/DL (ref 0–1.2)
BUN SERPL-MCNC: 13 MG/DL (ref 6–20)
BUN/CREAT SERPL: 13.8 (ref 7–25)
CALCIUM SPEC-SCNC: 9.4 MG/DL (ref 8.6–10.5)
CHLORIDE SERPL-SCNC: 102 MMOL/L (ref 98–107)
CO2 SERPL-SCNC: 19 MMOL/L (ref 22–29)
CREAT SERPL-MCNC: 0.94 MG/DL (ref 0.57–1)
DEPRECATED RDW RBC AUTO: 45.3 FL (ref 37–54)
EGFRCR SERPLBLD CKD-EPI 2021: 77.4 ML/MIN/1.73
EOSINOPHIL # BLD AUTO: 0.29 10*3/MM3 (ref 0–0.4)
EOSINOPHIL NFR BLD AUTO: 2.6 % (ref 0.3–6.2)
ERYTHROCYTE [DISTWIDTH] IN BLOOD BY AUTOMATED COUNT: 12.8 % (ref 12.3–15.4)
GLOBULIN UR ELPH-MCNC: 3.1 GM/DL
GLUCOSE SERPL-MCNC: 88 MG/DL (ref 65–99)
HCT VFR BLD AUTO: 47.4 % (ref 34–46.6)
HGB BLD-MCNC: 15.7 G/DL (ref 12–15.9)
IMM GRANULOCYTES # BLD AUTO: 0.15 10*3/MM3 (ref 0–0.05)
IMM GRANULOCYTES NFR BLD AUTO: 1.4 % (ref 0–0.5)
LYMPHOCYTES # BLD AUTO: 2.23 10*3/MM3 (ref 0.7–3.1)
LYMPHOCYTES NFR BLD AUTO: 20.2 % (ref 19.6–45.3)
MCH RBC QN AUTO: 32.5 PG (ref 26.6–33)
MCHC RBC AUTO-ENTMCNC: 33.1 G/DL (ref 31.5–35.7)
MCV RBC AUTO: 98.1 FL (ref 79–97)
MONOCYTES # BLD AUTO: 1.03 10*3/MM3 (ref 0.1–0.9)
MONOCYTES NFR BLD AUTO: 9.3 % (ref 5–12)
NEUTROPHILS NFR BLD AUTO: 65.7 % (ref 42.7–76)
NEUTROPHILS NFR BLD AUTO: 7.24 10*3/MM3 (ref 1.7–7)
NRBC BLD AUTO-RTO: 0 /100 WBC (ref 0–0.2)
PLATELET # BLD AUTO: 362 10*3/MM3 (ref 140–450)
PMV BLD AUTO: 9.4 FL (ref 6–12)
POTASSIUM SERPL-SCNC: 4.3 MMOL/L (ref 3.5–5.2)
PROT SERPL-MCNC: 7.6 G/DL (ref 6–8.5)
RBC # BLD AUTO: 4.83 10*6/MM3 (ref 3.77–5.28)
SODIUM SERPL-SCNC: 133 MMOL/L (ref 136–145)
WBC NRBC COR # BLD AUTO: 11.03 10*3/MM3 (ref 3.4–10.8)

## 2025-07-18 PROCEDURE — 86003 ALLG SPEC IGE CRUDE XTRC EA: CPT

## 2025-07-18 PROCEDURE — 82785 ASSAY OF IGE: CPT

## 2025-07-18 PROCEDURE — 80053 COMPREHEN METABOLIC PANEL: CPT | Performed by: OBSTETRICS & GYNECOLOGY

## 2025-07-18 PROCEDURE — 85025 COMPLETE CBC W/AUTO DIFF WBC: CPT

## 2025-07-18 PROCEDURE — 36415 COLL VENOUS BLD VENIPUNCTURE: CPT

## 2025-07-18 PROCEDURE — 99214 OFFICE O/P EST MOD 30 MIN: CPT | Performed by: NURSE PRACTITIONER

## 2025-07-18 PROCEDURE — 94618 PULMONARY STRESS TESTING: CPT | Performed by: NURSE PRACTITIONER

## 2025-07-18 RX ORDER — MONTELUKAST SODIUM 10 MG/1
10 TABLET ORAL NIGHTLY
Qty: 30 TABLET | Refills: 5 | Status: SHIPPED | OUTPATIENT
Start: 2025-07-18

## 2025-07-18 RX ORDER — ALBUTEROL SULFATE AND BUDESONIDE 90; 80 UG/1; UG/1
2 AEROSOL, METERED RESPIRATORY (INHALATION) EVERY 4 HOURS PRN
Qty: 10.7 G | Refills: 5 | Status: SHIPPED | OUTPATIENT
Start: 2025-07-18

## 2025-07-18 RX ORDER — CEFDINIR 300 MG/1
300 CAPSULE ORAL 2 TIMES DAILY
Qty: 20 CAPSULE | Refills: 0 | Status: SHIPPED | OUTPATIENT
Start: 2025-07-18

## 2025-07-18 RX ORDER — IMIQUIMOD 12.5 MG/.25G
CREAM TOPICAL
COMMUNITY
Start: 2025-07-14

## 2025-07-18 RX ORDER — PREDNISONE 20 MG/1
40 TABLET ORAL DAILY
Qty: 10 TABLET | Refills: 0 | Status: SHIPPED | OUTPATIENT
Start: 2025-07-18

## 2025-07-18 RX ORDER — TRETINOIN 0.1 MG/G
GEL TOPICAL
COMMUNITY
Start: 2025-07-14

## 2025-07-18 RX ORDER — LEVALBUTEROL INHALATION SOLUTION 0.63 MG/3ML
1 SOLUTION RESPIRATORY (INHALATION) EVERY 4 HOURS PRN
COMMUNITY
End: 2025-07-18 | Stop reason: SDUPTHER

## 2025-07-18 RX ORDER — LEVALBUTEROL INHALATION SOLUTION 0.63 MG/3ML
1 SOLUTION RESPIRATORY (INHALATION) EVERY 4 HOURS PRN
Qty: 540 ML | Refills: 5 | Status: SHIPPED | OUTPATIENT
Start: 2025-07-18

## 2025-07-18 RX ORDER — FLUTICASONE FUROATE, UMECLIDINIUM BROMIDE AND VILANTEROL TRIFENATATE 200; 62.5; 25 UG/1; UG/1; UG/1
1 POWDER RESPIRATORY (INHALATION) DAILY
Qty: 60 EACH | Refills: 5 | Status: SHIPPED | OUTPATIENT
Start: 2025-07-18

## 2025-07-18 RX ORDER — LUMATEPERONE 21 MG/1
CAPSULE ORAL
COMMUNITY
Start: 2025-07-15

## 2025-07-18 NOTE — PROGRESS NOTES
"     Follow Up Office Visit      Patient Name: Janell Terrell    Chief Complaint:    Chief Complaint   Patient presents with    Shortness of Breath       History of Present Illness: Janell Terrell is a 44 y.o. female who is here today for follow up of severe persistent asthma. Since last visit, she has continued on Trelegy and Tezspire.  She notes that she has establish care with an allergist, Dr. Vera, and the testing show that she is allergic to \"everything\".  She was told that her asthma is too severe to be considered for allergy shots that they have discussed with her possible transition from Tezspire to Xolair. She would like to be referred for evaluation of bronchial thermoplasty.    Supplemental Oxygen: No  Last Steroids: 2025    Subjective      Review of Systems:  Review of Systems   Constitutional:  Negative for fever and unexpected weight change.   Respiratory:  Positive for cough, shortness of breath and wheezing.    Cardiovascular:  Negative for chest pain and leg swelling.   Allergic/Immunologic: Positive for environmental allergies.        Past Medical History:   Past Medical History:   Diagnosis Date    Allergic rhinitis     Anxiety and depression 2006    situational;  was deployed    Asthma     Asthma, extrinsic Childood    Asthma, intrinsic     Childhood    Diabetes mellitus     gestational    GERD (gastroesophageal reflux disease)     Gestational diabetes     Hyperlipidemia 3/9/18    Hypertension 2008    Had preeclampsia and CHTN since then    Migraines     Pneumonia Childhood    Vitamin D deficiency 2016       Past Surgical History:   Past Surgical History:   Procedure Laterality Date     SECTION       SECTION WITH TUBAL Bilateral 2018    Procedure:  SECTION REPEAT WITH TUBAL * 37 WKS - GEST. DIABETIC, ELEVATED BPS*;  Surgeon: Terrence Dutta MD;  Location: [facility] LABOR DELIVERY;  Service: Obstetrics/Gynecology    WISDOM TOOTH EXTRACTION   "       Family History:   Family History   Problem Relation Age of Onset    Uterine cancer Mother         possibly    Breast cancer Mother 40        Lumpectomy    Hypothyroidism Mother     Thyroid disease Mother     Hypothyroidism Father     Thyroid disease Father     Asthma Sister     Hypothyroidism Sister     Asthma Sister     Diabetes Sister     Hypothyroidism Brother     Asthma Brother     Asthma Brother     Cancer Maternal Grandmother         Lung CA    Cancer Maternal Grandfather         Lung CA    Diabetes Sister     Thyroid disease Sister     Thyroid disease Brother     Ovarian cancer Neg Hx     Colon cancer Neg Hx        Social History:   Social History     Socioeconomic History    Marital status:    Tobacco Use    Smoking status: Never     Passive exposure: Past    Smokeless tobacco: Never    Tobacco comments:     Mother smoke home when patient was younger   Vaping Use    Vaping status: Never Used   Substance and Sexual Activity    Alcohol use: Yes     Comment: occasional    Drug use: No    Sexual activity: Yes     Partners: Male     Birth control/protection: Tubal ligation       Current Medications:     Current Outpatient Medications:     Airsupra 90-80 MCG/ACT aerosol, Inhale 2 puffs Every 4 (Four) Hours As Needed (shortness of breath, cough, wheezing)., Disp: 10.7 g, Rfl: 3    Caplyta 21 MG capsule, , Disp: , Rfl:     clonazePAM (KlonoPIN) 0.5 MG tablet, Take 1 tablet by mouth As Needed., Disp: , Rfl:     diazePAM (VALIUM) 5 MG tablet, Take 1 tablet by mouth 3 times a day., Disp: , Rfl:     EPINEPHrine (EPIPEN) 0.3 MG/0.3ML solution auto-injector injection, , Disp: , Rfl:     fexofenadine (ALLEGRA) 180 MG tablet, , Disp: , Rfl:     flunisolide (NASALIDE) 25 MCG/ACT (0.025%) solution nasal spray, Inhale 2 sprays Every 12 (Twelve) Hours., Disp: , Rfl:     imiquimod (ALDARA) 5 % cream, , Disp: , Rfl:     lamoTRIgine (LaMICtal) 25 MG tablet, Take 1 tablet by mouth Every 12 (Twelve) Hours., Disp: , Rfl:  "    levalbuterol (XOPENEX) 0.63 MG/3ML nebulizer solution, Take 1 ampule by nebulization Every 4 (Four) Hours As Needed for Wheezing., Disp: , Rfl:     levocetirizine (XYZAL) 5 MG tablet, , Disp: , Rfl:     losartan (COZAAR) 50 MG tablet, Take 1 tablet by mouth Every 12 (Twelve) Hours., Disp: , Rfl:     montelukast (SINGULAIR) 10 MG tablet, Take 1 tablet by mouth Every Night., Disp: 30 tablet, Rfl: 5    NON FORMULARY, Inject 10 Units under the skin into the appropriate area as directed 1 (One) Time Per Week. CargraSima for Diabetes, Disp: , Rfl:     Nurtec 75 MG dispersible tablet, DISSOLVE 1 TABLET IN MOUTH EVERY OTHER DAY, Disp: , Rfl:     omeprazole (priLOSEC) 40 MG capsule, Take 1 capsule by mouth Daily., Disp: 30 capsule, Rfl: 0    ondansetron (ZOFRAN) 4 MG tablet, Take 1 tablet by mouth 3 times a day., Disp: , Rfl:     sertraline (ZOLOFT) 50 MG tablet, Take 1 tablet by mouth Daily., Disp: , Rfl:     Tezepelumab-ekko (Tezspire) 210 MG/1.91ML solution auto-injector, Inject 1.91 mL under the skin into the appropriate area as directed Every 28 (Twenty-Eight) Days. **See note to pharmacy for copay card information**, Disp: 1.91 mL, Rfl: 11    Trelegy Ellipta 200-62.5-25 MCG/ACT inhaler, INHALE 1 PUFF BY MOUTH EVERY DAY, Disp: 60 each, Rfl: 0    tretinoin (RETIN-A) 0.01 % gel, , Disp: , Rfl:     cetirizine (zyrTEC) 10 MG tablet, Take 1 tablet by mouth Daily. (Patient not taking: Reported on 7/18/2025), Disp: , Rfl:      Allergies:   Allergies   Allergen Reactions    Doxycycline      Chemical esophagitis      Avelox [Moxifloxacin] Rash    Zithromax [Azithromycin] Rash     Objective     Physical Exam:  Vital Signs:   Vitals:    07/18/25 1015   BP: 114/68   Pulse: 78   SpO2: 96%   Weight: 97 kg (213 lb 12.8 oz)   Height: 165.1 cm (65\")     Body mass index is 35.58 kg/m².  ============================  ============================    6 MINUTE WALK TEST    Janell Terrell   1980             BASELINE   SpO2%: 96 % RA  "   Heart Rate 97   Blood Pressure 114/68     EXERCISE SpO2% HEART RATE RA or O2 @ LPM   1 MINUTE 97 97 RA   2 MINUTES 94 98 RA   3 MINUTES 96 105 RA   4 MINUTES 97 84 RA   5 MINUTES 98 89 RA   6 MINUTES 97 84 RA   (Number of laps: 8 X 36 meters + Final partial lap: 0.50 meters = 306 meters)            Distance Walked:  306 Meters   SpO2% Post Exercise:  97 %   HR Post Exercise:  85     Reason to stop (if applicable):   ____ Chest Pain   ____ Light Headedness   ____ Dyspnea Unrelieved by Rest   ____ Abnormal Gait Pattern   ____ Severe Fatigue   ____ Other (Specify: __________________________)    Tech Comments (if any): NA     Test performed by: BB    ============================  ============================      Physical Exam  Vitals reviewed.   Constitutional:       General: She is not in acute distress.     Appearance: She is not toxic-appearing.   HENT:      Head: Normocephalic and atraumatic.      Mouth/Throat:      Mouth: Mucous membranes are moist.   Eyes:      Extraocular Movements: Extraocular movements intact.      Conjunctiva/sclera: Conjunctivae normal.   Cardiovascular:      Rate and Rhythm: Normal rate.      Heart sounds: Normal heart sounds.   Pulmonary:      Effort: Pulmonary effort is normal.      Breath sounds: Rhonchi present.   Abdominal:      General: There is no distension.      Palpations: Abdomen is soft.   Musculoskeletal:         General: No swelling.      Cervical back: Neck supple.   Skin:     General: Skin is warm and dry.      Findings: No rash.   Neurological:      General: No focal deficit present.      Mental Status: She is alert and oriented to person, place, and time.   Psychiatric:         Mood and Affect: Mood normal.         Behavior: Behavior normal.       Results Review:   May 2025 chest x-ray showed no acute cardiopulmonary findings.    May 2023 spirometry showed moderate obstruction with significant bronchodilator response, postbronchodilator FEV1 of 73%.    WBC   Date Value  Ref Range Status   07/18/2025 11.03 (H) 3.40 - 10.80 10*3/mm3 Final   02/20/2025 9.12 3.40 - 10.80 10*3/mm3 Final     RBC   Date Value Ref Range Status   07/18/2025 4.83 3.77 - 5.28 10*6/mm3 Final   02/20/2025 4.43 3.77 - 5.28 10*6/mm3 Final     Hemoglobin   Date Value Ref Range Status   07/18/2025 15.7 12.0 - 15.9 g/dL Final     Hematocrit   Date Value Ref Range Status   07/18/2025 47.4 (H) 34.0 - 46.6 % Final     MCV   Date Value Ref Range Status   07/18/2025 98.1 (H) 79.0 - 97.0 fL Final     MCH   Date Value Ref Range Status   07/18/2025 32.5 26.6 - 33.0 pg Final     MCHC   Date Value Ref Range Status   07/18/2025 33.1 31.5 - 35.7 g/dL Final     RDW   Date Value Ref Range Status   07/18/2025 12.8 12.3 - 15.4 % Final     RDW-SD   Date Value Ref Range Status   07/18/2025 45.3 37.0 - 54.0 fl Final     MPV   Date Value Ref Range Status   07/18/2025 9.4 6.0 - 12.0 fL Final     Platelets   Date Value Ref Range Status   07/18/2025 362 140 - 450 10*3/mm3 Final     Neutrophil %   Date Value Ref Range Status   07/18/2025 65.7 42.7 - 76.0 % Final     Lymphocyte %   Date Value Ref Range Status   07/18/2025 20.2 19.6 - 45.3 % Final     Monocyte %   Date Value Ref Range Status   07/18/2025 9.3 5.0 - 12.0 % Final     Eosinophil %   Date Value Ref Range Status   07/18/2025 2.6 0.3 - 6.2 % Final     Basophil %   Date Value Ref Range Status   07/18/2025 0.8 0.0 - 1.5 % Final     Immature Grans %   Date Value Ref Range Status   07/18/2025 1.4 (H) 0.0 - 0.5 % Final     Neutrophils, Absolute   Date Value Ref Range Status   07/18/2025 7.24 (H) 1.70 - 7.00 10*3/mm3 Final     Lymphocytes, Absolute   Date Value Ref Range Status   07/18/2025 2.23 0.70 - 3.10 10*3/mm3 Final     Monocytes, Absolute   Date Value Ref Range Status   07/18/2025 1.03 (H) 0.10 - 0.90 10*3/mm3 Final     Eosinophils, Absolute   Date Value Ref Range Status   07/18/2025 0.29 0.00 - 0.40 10*3/mm3 Final     Basophils, Absolute   Date Value Ref Range Status   07/18/2025  0.09 0.00 - 0.20 10*3/mm3 Final     Immature Grans, Absolute   Date Value Ref Range Status   07/18/2025 0.15 (H) 0.00 - 0.05 10*3/mm3 Final     nRBC   Date Value Ref Range Status   07/18/2025 0.0 0.0 - 0.2 /100 WBC Final     Assessment / Plan      Assessment/Plan:   Diagnoses and all orders for this visit:    1. Shortness of breath (Primary)  -     6 Minute Walk Test; Future  -     Complete PFT - Pre & Post Bronchodilator; Future  No need for supplemental O2 per walk test performed today.  Plan for full PFTs prior to next clinic visit.    2. Severe persistent asthma without complication  -     Complete PFT - Pre & Post Bronchodilator; Future  -     predniSONE (DELTASONE) 20 MG tablet; Take 2 tablets by mouth Daily.  Dispense: 10 tablet; Refill: 0  -     Airsupra 90-80 MCG/ACT aerosol; Inhale 2 puffs Every 4 (Four) Hours As Needed (shortness of breath, cough, wheezing).  Dispense: 10.7 g; Refill: 5  -     Fluticasone-Umeclidin-Vilant (Trelegy Ellipta) 200-62.5-25 MCG/ACT inhaler; Inhale 1 puff Daily. Rinse mouth out after use  Dispense: 60 each; Refill: 5  -     levalbuterol (XOPENEX) 0.63 MG/3ML nebulizer solution; Take 1 ampule by nebulization Every 4 (Four) Hours As Needed for Wheezing or Shortness of Air.  Dispense: 540 mL; Refill: 5  -     cefdinir (OMNICEF) 300 MG capsule; Take 1 capsule by mouth 2 (Two) Times a Day.  Dispense: 20 capsule; Refill: 0  Symptoms appear to be at her current baseline. Will continue on Trelegy and Tezspire as her maintenance regimen. Continue use of  Xopenex  OR Airsupra as needed. Will send in a course of antibiotics and steroids as a rescue pack to have on hand in the event of an exacerbation. Discussed possible side effects of medications. Risk and benefits of the medication were discussed with patient. She requests referral to be evaluated for bronchial thermoplasty.  Unsure if she will be a candidate for this.  She requests evaluation with interventional pulmonology at the  [facility] based on her insurance as  is now out of network for her.    3. Allergic rhinitis, unspecified seasonality, unspecified trigger  -     montelukast (SINGULAIR) 10 MG tablet; Take 1 tablet by mouth Every Night.  Dispense: 30 tablet; Refill: 5  Following with Dr. Vera and recently had allergy testing.      Follow Up:   Return in about 6 months (around 1/18/2026) for Recheck.  The patient was counseled on diagnostic results, risks and benefits of treatment options, risk factor modifications and the importance of treatment compliance. The patient was advised to contact the clinic with concerns or worsening symptoms.     GEGE Smith   Pulmonary Medicine [place]     This document has been electronically signed by GEGE Smith  July 18, 2025

## 2025-07-22 ENCOUNTER — TELEPHONE (OUTPATIENT)
Dept: PULMONOLOGY | Facility: CLINIC | Age: 45
End: 2025-07-22

## 2025-07-23 ENCOUNTER — TELEPHONE (OUTPATIENT)
Dept: PULMONOLOGY | Facility: CLINIC | Age: 45
End: 2025-07-23

## 2025-07-23 LAB
A ALTERNATA IGE QN: <0.1 KU/L
A FUMIGATUS IGE QN: <0.1 KU/L
BERMUDA GRASS IGE QN: 0.57 KU/L
BOXELDER IGE QN: 0.17 KU/L
C HERBARUM IGE QN: <0.1 KU/L
CALIF WALNUT POLN IGE QN: 3.24 KU/L
CAT DANDER IGE QN: 3.82 KU/L
CMN PIGWEED IGE QN: <0.1 KU/L
COMMON RAGWEED IGE QN: 6.18 KU/L
COTTONWOOD IGE QN: <0.1 KU/L
D FARINAE IGE QN: 0.28 KU/L
D PTERONYSS IGE QN: 0.3 KU/L
DOG DANDER IGE QN: 0.68 KU/L
IGE SERPL-ACNC: 105 IU/ML (ref 6–495)
LONDON PLANE IGE QN: <0.1 KU/L
MOUSE URINE PROT IGE QN: 8.05 KU/L
MT JUNIPER IGE QN: 1.23 KU/L
P NOTATUM IGE QN: <0.1 KU/L
PECAN/HICK TREE IGE QN: 5.81 KU/L
ROACH IGE QN: <0.1 KU/L
SALTWORT IGE QN: <0.1 KU/L
SERVICE CMNT-IMP: ABNORMAL
SHEEP SORREL IGE QN: 0.46 KU/L
SILVER BIRCH IGE QN: 0.37 KU/L
TIMOTHY IGE QN: 2.14 KU/L
WHITE ASH IGE QN: 0.18 KU/L
WHITE ELM IGE QN: <0.1 KU/L
WHITE MULBERRY IGE QN: <0.1 KU/L
WHITE OAK IGE QN: <0.1 KU/L

## 2025-08-14 ENCOUNTER — TELEPHONE (OUTPATIENT)
Dept: PULMONOLOGY | Facility: CLINIC | Age: 45
End: 2025-08-14
Payer: COMMERCIAL

## 2025-08-14 ENCOUNTER — HOSPITAL ENCOUNTER (OUTPATIENT)
Dept: MRI IMAGING | Facility: HOSPITAL | Age: 45
Discharge: HOME | End: 2025-08-14
Admitting: STUDENT IN AN ORGANIZED HEALTH CARE EDUCATION/TRAINING PROGRAM
Payer: COMMERCIAL

## 2025-08-14 PROCEDURE — 73221 MRI JOINT UPR EXTREM W/O DYE: CPT

## 2025-08-15 ENCOUNTER — RESULTS FOLLOW-UP (OUTPATIENT)
Dept: ORTHOPEDIC SURGERY | Facility: CLINIC | Age: 45
End: 2025-08-15
Payer: COMMERCIAL

## (undated) DEVICE — SUT PLAIN  3/0 CT1 27IN 842H

## (undated) DEVICE — SUT VIC 2/0 CT1 27IN J339H BX/36

## (undated) DEVICE — SUT GUT CHRM 1 CTX 36IN 905H

## (undated) DEVICE — MAT PREVALON MOBL TRANSFR AIR WO/PAD 39X80IN

## (undated) DEVICE — SOL IRR H2O BTL 1000ML STRL

## (undated) DEVICE — TRY SPINE BLCK WHITACRE 25G 3X5IN

## (undated) DEVICE — GLV SURG SENSICARE W/ALOE PF LF 8 STRL

## (undated) DEVICE — SUT GUT PLN 0 STD TIE 54IN S104H

## (undated) DEVICE — PK C/SECT 10

## (undated) DEVICE — SOL IRR NACL 0.9PCT BT 1000ML

## (undated) DEVICE — PROXIMATE RH ROTATING HEAD SKIN STAPLERS (35 WIDE) CONTAINS 35 STAINLESS STEEL STAPLES: Brand: PROXIMATE